# Patient Record
Sex: FEMALE | Race: WHITE | Employment: OTHER | URBAN - METROPOLITAN AREA
[De-identification: names, ages, dates, MRNs, and addresses within clinical notes are randomized per-mention and may not be internally consistent; named-entity substitution may affect disease eponyms.]

---

## 2018-04-05 ENCOUNTER — HOSPITAL ENCOUNTER (OUTPATIENT)
Age: 83
Discharge: HOME | End: 2018-04-07
Payer: MEDICARE

## 2018-04-05 ENCOUNTER — HOSPITAL ENCOUNTER (OUTPATIENT)
Dept: GENERAL RADIOLOGY | Age: 83
Discharge: HOME | End: 2018-04-07
Payer: MEDICARE

## 2018-04-05 DIAGNOSIS — J40 BRONCHITIS: ICD-10-CM

## 2018-04-05 PROCEDURE — 71046 X-RAY EXAM CHEST 2 VIEWS: CPT

## 2018-08-20 ENCOUNTER — HOSPITAL ENCOUNTER (OUTPATIENT)
Age: 83
Discharge: HOME | End: 2018-08-20
Payer: MEDICARE

## 2018-08-20 LAB
ALBUMIN SERPL-MCNC: 4 G/DL (ref 3.5–5.2)
ALP BLD-CCNC: 73 U/L (ref 35–104)
ALT SERPL-CCNC: 11 U/L (ref 0–32)
ANION GAP SERPL CALCULATED.3IONS-SCNC: 10 MMOL/L (ref 7–16)
AST SERPL-CCNC: 21 U/L (ref 0–31)
BASOPHILS ABSOLUTE: 0.03 E9/L (ref 0–0.2)
BASOPHILS RELATIVE PERCENT: 0.5 % (ref 0–2)
BILIRUB SERPL-MCNC: 0.5 MG/DL (ref 0–1.2)
BUN BLDV-MCNC: 15 MG/DL (ref 8–23)
CALCIUM SERPL-MCNC: 9.3 MG/DL (ref 8.6–10.2)
CHLORIDE BLD-SCNC: 98 MMOL/L (ref 98–107)
CHOLESTEROL, FASTING: 202 MG/DL (ref 0–199)
CO2: 33 MMOL/L (ref 22–29)
CREAT SERPL-MCNC: 1.1 MG/DL (ref 0.5–1)
EOSINOPHILS ABSOLUTE: 0.19 E9/L (ref 0.05–0.5)
EOSINOPHILS RELATIVE PERCENT: 2.9 % (ref 0–6)
GFR AFRICAN AMERICAN: 57
GFR NON-AFRICAN AMERICAN: 47 ML/MIN/1.73
GLUCOSE FASTING: 105 MG/DL (ref 74–109)
HCT VFR BLD CALC: 38.8 % (ref 34–48)
HDLC SERPL-MCNC: 90 MG/DL
HEMOGLOBIN: 12.6 G/DL (ref 11.5–15.5)
IMMATURE GRANULOCYTES #: 0.03 E9/L
IMMATURE GRANULOCYTES %: 0.5 % (ref 0–5)
LDL CHOLESTEROL CALCULATED: 92 MG/DL (ref 0–99)
LYMPHOCYTES ABSOLUTE: 1.92 E9/L (ref 1.5–4)
LYMPHOCYTES RELATIVE PERCENT: 29.5 % (ref 20–42)
MCH RBC QN AUTO: 29.4 PG (ref 26–35)
MCHC RBC AUTO-ENTMCNC: 32.5 % (ref 32–34.5)
MCV RBC AUTO: 90.4 FL (ref 80–99.9)
MONOCYTES ABSOLUTE: 0.65 E9/L (ref 0.1–0.95)
MONOCYTES RELATIVE PERCENT: 10 % (ref 2–12)
NEUTROPHILS ABSOLUTE: 3.68 E9/L (ref 1.8–7.3)
NEUTROPHILS RELATIVE PERCENT: 56.6 % (ref 43–80)
PDW BLD-RTO: 13 FL (ref 11.5–15)
PLATELET # BLD: 178 E9/L (ref 130–450)
PMV BLD AUTO: 10.9 FL (ref 7–12)
POTASSIUM SERPL-SCNC: 4.9 MMOL/L (ref 3.5–5)
RBC # BLD: 4.29 E12/L (ref 3.5–5.5)
SODIUM BLD-SCNC: 141 MMOL/L (ref 132–146)
TOTAL PROTEIN: 6.7 G/DL (ref 6.4–8.3)
TRIGLYCERIDE, FASTING: 101 MG/DL (ref 0–149)
TSH SERPL DL<=0.05 MIU/L-ACNC: 2.14 UIU/ML (ref 0.27–4.2)
VITAMIN D 25-HYDROXY: 47 NG/ML (ref 30–100)
VLDLC SERPL CALC-MCNC: 20 MG/DL
WBC # BLD: 6.5 E9/L (ref 4.5–11.5)

## 2018-08-20 PROCEDURE — 85025 COMPLETE CBC W/AUTO DIFF WBC: CPT

## 2018-08-20 PROCEDURE — 36415 COLL VENOUS BLD VENIPUNCTURE: CPT

## 2018-08-20 PROCEDURE — 82306 VITAMIN D 25 HYDROXY: CPT

## 2018-08-20 PROCEDURE — 80053 COMPREHEN METABOLIC PANEL: CPT

## 2018-08-20 PROCEDURE — 84443 ASSAY THYROID STIM HORMONE: CPT

## 2018-08-20 PROCEDURE — 80061 LIPID PANEL: CPT

## 2019-01-28 ENCOUNTER — HOSPITAL ENCOUNTER (OUTPATIENT)
Age: 84
Discharge: HOME | End: 2019-01-28
Payer: MEDICARE

## 2019-01-28 LAB
ALBUMIN SERPL-MCNC: 4.1 G/DL (ref 3.5–5.2)
ALP BLD-CCNC: 74 U/L (ref 35–104)
ALT SERPL-CCNC: 11 U/L (ref 0–32)
ANION GAP SERPL CALCULATED.3IONS-SCNC: 11 MMOL/L (ref 7–16)
AST SERPL-CCNC: 22 U/L (ref 0–31)
BASOPHILS ABSOLUTE: 0.03 E9/L (ref 0–0.2)
BASOPHILS RELATIVE PERCENT: 0.6 % (ref 0–2)
BILIRUB SERPL-MCNC: 0.5 MG/DL (ref 0–1.2)
BUN BLDV-MCNC: 17 MG/DL (ref 8–23)
CALCIUM SERPL-MCNC: 9.3 MG/DL (ref 8.6–10.2)
CHLORIDE BLD-SCNC: 100 MMOL/L (ref 98–107)
CHOLESTEROL, TOTAL: 171 MG/DL (ref 0–199)
CO2: 31 MMOL/L (ref 22–29)
CREAT SERPL-MCNC: 0.9 MG/DL (ref 0.5–1)
EOSINOPHILS ABSOLUTE: 0.17 E9/L (ref 0.05–0.5)
EOSINOPHILS RELATIVE PERCENT: 3.4 % (ref 0–6)
GFR AFRICAN AMERICAN: >60
GFR NON-AFRICAN AMERICAN: 59 ML/MIN/1.73
GLUCOSE BLD-MCNC: 106 MG/DL (ref 74–99)
HCT VFR BLD CALC: 41.1 % (ref 34–48)
HDLC SERPL-MCNC: 69 MG/DL
HEMOGLOBIN: 13 G/DL (ref 11.5–15.5)
IMMATURE GRANULOCYTES #: 0.01 E9/L
IMMATURE GRANULOCYTES %: 0.2 % (ref 0–5)
LDL CHOLESTEROL CALCULATED: 83 MG/DL (ref 0–99)
LYMPHOCYTES ABSOLUTE: 1.32 E9/L (ref 1.5–4)
LYMPHOCYTES RELATIVE PERCENT: 26.6 % (ref 20–42)
MCH RBC QN AUTO: 28.6 PG (ref 26–35)
MCHC RBC AUTO-ENTMCNC: 31.6 % (ref 32–34.5)
MCV RBC AUTO: 90.5 FL (ref 80–99.9)
MONOCYTES ABSOLUTE: 0.49 E9/L (ref 0.1–0.95)
MONOCYTES RELATIVE PERCENT: 9.9 % (ref 2–12)
NEUTROPHILS ABSOLUTE: 2.94 E9/L (ref 1.8–7.3)
NEUTROPHILS RELATIVE PERCENT: 59.3 % (ref 43–80)
PDW BLD-RTO: 13.7 FL (ref 11.5–15)
PLATELET # BLD: 155 E9/L (ref 130–450)
PMV BLD AUTO: 11.2 FL (ref 7–12)
POTASSIUM SERPL-SCNC: 4.2 MMOL/L (ref 3.5–5)
RBC # BLD: 4.54 E12/L (ref 3.5–5.5)
SODIUM BLD-SCNC: 142 MMOL/L (ref 132–146)
TOTAL PROTEIN: 6.5 G/DL (ref 6.4–8.3)
TRIGL SERPL-MCNC: 96 MG/DL (ref 0–149)
TSH SERPL DL<=0.05 MIU/L-ACNC: 2.38 UIU/ML (ref 0.27–4.2)
VITAMIN D 25-HYDROXY: 37 NG/ML (ref 30–100)
VLDLC SERPL CALC-MCNC: 19 MG/DL
WBC # BLD: 5 E9/L (ref 4.5–11.5)

## 2019-01-28 PROCEDURE — 82306 VITAMIN D 25 HYDROXY: CPT

## 2019-01-28 PROCEDURE — 85025 COMPLETE CBC W/AUTO DIFF WBC: CPT

## 2019-01-28 PROCEDURE — 84443 ASSAY THYROID STIM HORMONE: CPT

## 2019-01-28 PROCEDURE — 80053 COMPREHEN METABOLIC PANEL: CPT

## 2019-01-28 PROCEDURE — 36415 COLL VENOUS BLD VENIPUNCTURE: CPT

## 2019-01-28 PROCEDURE — 80061 LIPID PANEL: CPT

## 2019-09-20 ENCOUNTER — HOSPITAL ENCOUNTER (OUTPATIENT)
Age: 84
Discharge: HOME | End: 2019-09-20
Payer: MEDICARE

## 2019-09-20 LAB
ALBUMIN SERPL-MCNC: 4.2 G/DL (ref 3.5–5.2)
ALP BLD-CCNC: 78 U/L (ref 35–104)
ALT SERPL-CCNC: 11 U/L (ref 0–32)
ANION GAP SERPL CALCULATED.3IONS-SCNC: 9 MMOL/L (ref 7–16)
AST SERPL-CCNC: 21 U/L (ref 0–31)
BASOPHILS ABSOLUTE: 0.03 E9/L (ref 0–0.2)
BASOPHILS RELATIVE PERCENT: 0.5 % (ref 0–2)
BILIRUB SERPL-MCNC: 0.4 MG/DL (ref 0–1.2)
BUN BLDV-MCNC: 21 MG/DL (ref 8–23)
CALCIUM SERPL-MCNC: 9.3 MG/DL (ref 8.6–10.2)
CHLORIDE BLD-SCNC: 100 MMOL/L (ref 98–107)
CHOLESTEROL, TOTAL: 183 MG/DL (ref 0–199)
CO2: 33 MMOL/L (ref 22–29)
CREAT SERPL-MCNC: 1 MG/DL (ref 0.5–1)
EOSINOPHILS ABSOLUTE: 0.23 E9/L (ref 0.05–0.5)
EOSINOPHILS RELATIVE PERCENT: 3.7 % (ref 0–6)
GFR AFRICAN AMERICAN: >60
GFR NON-AFRICAN AMERICAN: 52 ML/MIN/1.73
GLUCOSE BLD-MCNC: 104 MG/DL (ref 74–99)
HCT VFR BLD CALC: 43.4 % (ref 34–48)
HDLC SERPL-MCNC: 80 MG/DL
HEMOGLOBIN: 13.9 G/DL (ref 11.5–15.5)
IMMATURE GRANULOCYTES #: 0.01 E9/L
IMMATURE GRANULOCYTES %: 0.2 % (ref 0–5)
LDL CHOLESTEROL CALCULATED: 87 MG/DL (ref 0–99)
LYMPHOCYTES ABSOLUTE: 1.43 E9/L (ref 1.5–4)
LYMPHOCYTES RELATIVE PERCENT: 22.8 % (ref 20–42)
MCH RBC QN AUTO: 29.6 PG (ref 26–35)
MCHC RBC AUTO-ENTMCNC: 32 % (ref 32–34.5)
MCV RBC AUTO: 92.3 FL (ref 80–99.9)
MONOCYTES ABSOLUTE: 0.63 E9/L (ref 0.1–0.95)
MONOCYTES RELATIVE PERCENT: 10 % (ref 2–12)
NEUTROPHILS ABSOLUTE: 3.95 E9/L (ref 1.8–7.3)
NEUTROPHILS RELATIVE PERCENT: 62.8 % (ref 43–80)
PDW BLD-RTO: 13.2 FL (ref 11.5–15)
PLATELET # BLD: 161 E9/L (ref 130–450)
PMV BLD AUTO: 11.3 FL (ref 7–12)
POTASSIUM SERPL-SCNC: 4.3 MMOL/L (ref 3.5–5)
RBC # BLD: 4.7 E12/L (ref 3.5–5.5)
SODIUM BLD-SCNC: 142 MMOL/L (ref 132–146)
TOTAL PROTEIN: 7.4 G/DL (ref 6.4–8.3)
TRIGL SERPL-MCNC: 81 MG/DL (ref 0–149)
TSH SERPL DL<=0.05 MIU/L-ACNC: 2.4 UIU/ML (ref 0.27–4.2)
VITAMIN D 25-HYDROXY: 49 NG/ML (ref 30–100)
VLDLC SERPL CALC-MCNC: 16 MG/DL
WBC # BLD: 6.3 E9/L (ref 4.5–11.5)

## 2019-09-20 PROCEDURE — 85025 COMPLETE CBC W/AUTO DIFF WBC: CPT

## 2019-09-20 PROCEDURE — 36415 COLL VENOUS BLD VENIPUNCTURE: CPT

## 2019-09-20 PROCEDURE — 82306 VITAMIN D 25 HYDROXY: CPT

## 2019-09-20 PROCEDURE — 80061 LIPID PANEL: CPT

## 2019-09-20 PROCEDURE — 84443 ASSAY THYROID STIM HORMONE: CPT

## 2019-09-20 PROCEDURE — 80053 COMPREHEN METABOLIC PANEL: CPT

## 2020-05-15 ENCOUNTER — HOSPITAL ENCOUNTER (OUTPATIENT)
Dept: GENERAL RADIOLOGY | Age: 85
Discharge: HOME | End: 2020-05-17
Payer: MEDICARE

## 2020-05-15 ENCOUNTER — HOSPITAL ENCOUNTER (OUTPATIENT)
Age: 85
Discharge: HOME | End: 2020-05-17
Payer: MEDICARE

## 2020-05-15 PROCEDURE — 71046 X-RAY EXAM CHEST 2 VIEWS: CPT

## 2020-09-11 ENCOUNTER — APPOINTMENT (OUTPATIENT)
Dept: GENERAL RADIOLOGY | Age: 85
End: 2020-09-11
Payer: MEDICARE

## 2020-09-11 ENCOUNTER — HOSPITAL ENCOUNTER (OUTPATIENT)
Age: 85
Setting detail: OBSERVATION
Discharge: HOME | End: 2020-09-13
Attending: EMERGENCY MEDICINE | Admitting: INTERNAL MEDICINE
Payer: MEDICARE

## 2020-09-11 LAB
ALBUMIN SERPL-MCNC: 4.1 G/DL (ref 3.5–5.2)
ALP BLD-CCNC: 95 U/L (ref 35–104)
ALT SERPL-CCNC: 11 U/L (ref 0–32)
ANION GAP SERPL CALCULATED.3IONS-SCNC: 17 MMOL/L (ref 7–16)
AST SERPL-CCNC: 24 U/L (ref 0–31)
BASOPHILS ABSOLUTE: 0.04 E9/L (ref 0–0.2)
BASOPHILS RELATIVE PERCENT: 0.6 % (ref 0–2)
BILIRUB SERPL-MCNC: 0.3 MG/DL (ref 0–1.2)
BUN BLDV-MCNC: 21 MG/DL (ref 8–23)
CALCIUM SERPL-MCNC: 9.4 MG/DL (ref 8.6–10.2)
CHLORIDE BLD-SCNC: 95 MMOL/L (ref 98–107)
CO2: 28 MMOL/L (ref 22–29)
CREAT SERPL-MCNC: 1 MG/DL (ref 0.5–1)
EOSINOPHILS ABSOLUTE: 0.18 E9/L (ref 0.05–0.5)
EOSINOPHILS RELATIVE PERCENT: 2.6 % (ref 0–6)
GFR AFRICAN AMERICAN: >60
GFR NON-AFRICAN AMERICAN: 52 ML/MIN/1.73
GLUCOSE BLD-MCNC: 151 MG/DL (ref 74–99)
HCT VFR BLD CALC: 41.6 % (ref 34–48)
HEMOGLOBIN: 13.4 G/DL (ref 11.5–15.5)
IMMATURE GRANULOCYTES #: 0.03 E9/L
IMMATURE GRANULOCYTES %: 0.4 % (ref 0–5)
LYMPHOCYTES ABSOLUTE: 2.02 E9/L (ref 1.5–4)
LYMPHOCYTES RELATIVE PERCENT: 29.2 % (ref 20–42)
MCH RBC QN AUTO: 28.3 PG (ref 26–35)
MCHC RBC AUTO-ENTMCNC: 32.2 % (ref 32–34.5)
MCV RBC AUTO: 87.9 FL (ref 80–99.9)
MONOCYTES ABSOLUTE: 0.74 E9/L (ref 0.1–0.95)
MONOCYTES RELATIVE PERCENT: 10.7 % (ref 2–12)
NEUTROPHILS ABSOLUTE: 3.91 E9/L (ref 1.8–7.3)
NEUTROPHILS RELATIVE PERCENT: 56.5 % (ref 43–80)
PDW BLD-RTO: 13.6 FL (ref 11.5–15)
PLATELET # BLD: 184 E9/L (ref 130–450)
PMV BLD AUTO: 11.5 FL (ref 7–12)
POTASSIUM SERPL-SCNC: 3.1 MMOL/L (ref 3.5–5)
RBC # BLD: 4.73 E12/L (ref 3.5–5.5)
SODIUM BLD-SCNC: 140 MMOL/L (ref 132–146)
TOTAL PROTEIN: 7.6 G/DL (ref 6.4–8.3)
TROPONIN: <0.01 NG/ML (ref 0–0.03)
TSH SERPL DL<=0.05 MIU/L-ACNC: 3.88 UIU/ML (ref 0.27–4.2)
WBC # BLD: 6.9 E9/L (ref 4.5–11.5)

## 2020-09-11 PROCEDURE — 93005 ELECTROCARDIOGRAM TRACING: CPT | Performed by: PHYSICIAN ASSISTANT

## 2020-09-11 PROCEDURE — 80053 COMPREHEN METABOLIC PANEL: CPT

## 2020-09-11 PROCEDURE — 71045 X-RAY EXAM CHEST 1 VIEW: CPT

## 2020-09-11 PROCEDURE — 85025 COMPLETE CBC W/AUTO DIFF WBC: CPT

## 2020-09-11 PROCEDURE — 84443 ASSAY THYROID STIM HORMONE: CPT

## 2020-09-11 PROCEDURE — 99285 EMERGENCY DEPT VISIT HI MDM: CPT

## 2020-09-11 PROCEDURE — 84484 ASSAY OF TROPONIN QUANT: CPT

## 2020-09-11 RX ORDER — FUROSEMIDE 20 MG/1
20 TABLET ORAL 2 TIMES DAILY
Status: ON HOLD | COMMUNITY
End: 2020-09-13 | Stop reason: SDUPTHER

## 2020-09-11 RX ORDER — POTASSIUM CHLORIDE 20 MEQ/1
40 TABLET, EXTENDED RELEASE ORAL ONCE
Status: COMPLETED | OUTPATIENT
Start: 2020-09-12 | End: 2020-09-12

## 2020-09-12 ENCOUNTER — APPOINTMENT (OUTPATIENT)
Dept: ULTRASOUND IMAGING | Age: 85
End: 2020-09-12
Payer: MEDICARE

## 2020-09-12 PROBLEM — R55 NEAR SYNCOPE: Status: ACTIVE | Noted: 2020-09-12

## 2020-09-12 LAB
EKG ATRIAL RATE: 68 BPM
EKG P AXIS: 70 DEGREES
EKG P-R INTERVAL: 166 MS
EKG Q-T INTERVAL: 426 MS
EKG QRS DURATION: 84 MS
EKG QTC CALCULATION (BAZETT): 452 MS
EKG R AXIS: -16 DEGREES
EKG T AXIS: 41 DEGREES
EKG VENTRICULAR RATE: 68 BPM
LV EF: 61 %
LVEF MODALITY: NORMAL

## 2020-09-12 PROCEDURE — 93306 TTE W/DOPPLER COMPLETE: CPT

## 2020-09-12 PROCEDURE — 6370000000 HC RX 637 (ALT 250 FOR IP): Performed by: INTERNAL MEDICINE

## 2020-09-12 PROCEDURE — 93307 TTE W/O DOPPLER COMPLETE: CPT

## 2020-09-12 PROCEDURE — 93010 ELECTROCARDIOGRAM REPORT: CPT | Performed by: INTERNAL MEDICINE

## 2020-09-12 PROCEDURE — 96372 THER/PROPH/DIAG INJ SC/IM: CPT

## 2020-09-12 PROCEDURE — 6360000002 HC RX W HCPCS: Performed by: INTERNAL MEDICINE

## 2020-09-12 PROCEDURE — 93880 EXTRACRANIAL BILAT STUDY: CPT

## 2020-09-12 PROCEDURE — G0378 HOSPITAL OBSERVATION PER HR: HCPCS

## 2020-09-12 PROCEDURE — 6370000000 HC RX 637 (ALT 250 FOR IP): Performed by: PHYSICIAN ASSISTANT

## 2020-09-12 RX ORDER — POTASSIUM CHLORIDE 20 MEQ/1
20 TABLET, EXTENDED RELEASE ORAL DAILY
Status: DISCONTINUED | OUTPATIENT
Start: 2020-09-12 | End: 2020-09-13 | Stop reason: HOSPADM

## 2020-09-12 RX ORDER — CLONIDINE HYDROCHLORIDE 0.1 MG/1
0.1 TABLET ORAL 2 TIMES DAILY
Status: DISCONTINUED | OUTPATIENT
Start: 2020-09-12 | End: 2020-09-13 | Stop reason: HOSPADM

## 2020-09-12 RX ORDER — ATORVASTATIN CALCIUM 20 MG/1
20 TABLET, FILM COATED ORAL DAILY
COMMUNITY

## 2020-09-12 RX ORDER — ACETAMINOPHEN 500 MG
500 TABLET ORAL EVERY 6 HOURS PRN
Status: DISCONTINUED | OUTPATIENT
Start: 2020-09-12 | End: 2020-09-13 | Stop reason: HOSPADM

## 2020-09-12 RX ORDER — CLONIDINE HYDROCHLORIDE 0.2 MG/1
0.2 TABLET ORAL 2 TIMES DAILY
Status: ON HOLD | COMMUNITY
End: 2020-09-13 | Stop reason: SDUPTHER

## 2020-09-12 RX ORDER — CLONIDINE HYDROCHLORIDE 0.2 MG/1
0.2 TABLET ORAL 2 TIMES DAILY
Status: DISCONTINUED | OUTPATIENT
Start: 2020-09-12 | End: 2020-09-12

## 2020-09-12 RX ORDER — FUROSEMIDE 20 MG/1
20 TABLET ORAL 2 TIMES DAILY
Status: DISCONTINUED | OUTPATIENT
Start: 2020-09-12 | End: 2020-09-13 | Stop reason: HOSPADM

## 2020-09-12 RX ORDER — ASPIRIN 81 MG/1
81 TABLET ORAL DAILY
Status: DISCONTINUED | OUTPATIENT
Start: 2020-09-12 | End: 2020-09-13 | Stop reason: HOSPADM

## 2020-09-12 RX ORDER — ATORVASTATIN CALCIUM 20 MG/1
20 TABLET, FILM COATED ORAL DAILY
Status: DISCONTINUED | OUTPATIENT
Start: 2020-09-12 | End: 2020-09-13 | Stop reason: HOSPADM

## 2020-09-12 RX ORDER — CLOPIDOGREL BISULFATE 75 MG/1
75 TABLET ORAL DAILY
Status: DISCONTINUED | OUTPATIENT
Start: 2020-09-12 | End: 2020-09-13 | Stop reason: HOSPADM

## 2020-09-12 RX ORDER — LORATADINE 10 MG
1 TABLET ORAL 2 TIMES DAILY
Status: DISCONTINUED | OUTPATIENT
Start: 2020-09-12 | End: 2020-09-12 | Stop reason: CLARIF

## 2020-09-12 RX ORDER — LISINOPRIL 10 MG/1
10 TABLET ORAL DAILY
Status: DISCONTINUED | OUTPATIENT
Start: 2020-09-12 | End: 2020-09-13

## 2020-09-12 RX ORDER — ROPINIROLE 1 MG/1
1 TABLET, FILM COATED ORAL NIGHTLY
Status: DISCONTINUED | OUTPATIENT
Start: 2020-09-12 | End: 2020-09-13 | Stop reason: HOSPADM

## 2020-09-12 RX ORDER — LEVOTHYROXINE SODIUM 0.03 MG/1
25 TABLET ORAL DAILY
Status: DISCONTINUED | OUTPATIENT
Start: 2020-09-12 | End: 2020-09-13 | Stop reason: HOSPADM

## 2020-09-12 RX ADMIN — POTASSIUM CHLORIDE 20 MEQ: 1500 TABLET, EXTENDED RELEASE ORAL at 08:41

## 2020-09-12 RX ADMIN — LEVOTHYROXINE SODIUM 25 MCG: 25 TABLET ORAL at 05:58

## 2020-09-12 RX ADMIN — ASPIRIN 81 MG: 81 TABLET, COATED ORAL at 08:40

## 2020-09-12 RX ADMIN — FUROSEMIDE 20 MG: 20 TABLET ORAL at 08:41

## 2020-09-12 RX ADMIN — ATORVASTATIN CALCIUM 20 MG: 20 TABLET, FILM COATED ORAL at 08:40

## 2020-09-12 RX ADMIN — CLOPIDOGREL BISULFATE 75 MG: 75 TABLET ORAL at 08:40

## 2020-09-12 RX ADMIN — POTASSIUM CHLORIDE 40 MEQ: 1500 TABLET, EXTENDED RELEASE ORAL at 00:38

## 2020-09-12 RX ADMIN — LISINOPRIL 10 MG: 10 TABLET ORAL at 08:40

## 2020-09-12 RX ADMIN — FUROSEMIDE 20 MG: 20 TABLET ORAL at 21:37

## 2020-09-12 RX ADMIN — CLONIDINE HYDROCHLORIDE 0.2 MG: 0.2 TABLET ORAL at 08:40

## 2020-09-12 RX ADMIN — ROPINIROLE HYDROCHLORIDE 1 MG: 1 TABLET, FILM COATED ORAL at 17:22

## 2020-09-12 RX ADMIN — ENOXAPARIN SODIUM 40 MG: 40 INJECTION SUBCUTANEOUS at 08:46

## 2020-09-12 ASSESSMENT — PAIN SCALES - GENERAL
PAINLEVEL_OUTOF10: 0

## 2020-09-12 NOTE — ED PROVIDER NOTES
ED Attending  CC: No                                                                                                                                    Department of Emergency Medicine   ED  Provider Note  Admit Date/RoomTime: 9/11/2020 10:16 PM  ED Room: 02/02        HPI:  9/11/20,   Time: 10:28 PM EDT         Rianna Guevara is a 80 y.o. female presenting to the ED for near syncope, beginning just prior to arrival.  The complaint has been improved, moderate in severity, and worsened by nothing. Pt presents via EMS reporting near syncopal episode. 1 of her relatives is actually 1 of the techs here in the ED. She reports that the patient was sitting in a barstool when she got very pale and clammy. The patient slumped down but did not pass out altogether. She tells me she felt dizzy and lightheaded and could tell she was going to pass out. According to the family member her pulse at the time was 52. The patient states that she has had this episodes before though nothing this severe. She does have a cardiologist and syncope as listed on her problem list but I have nothing more specific. When asked about a Holter monitor she tells me she does not believe she is ever had one. She does have a past history of stroke and carotid endarterectomy. At this time the patient is completely asymptomatic. Apparently this episode lasted about 10 minutes. She has no symptoms at this time. Denies chest pain, shortness of breath, dizziness, or weakness. She is not on any rate limiting meds. Patient did not fall or hurt herself in any way. She states that she has had very good recent health. I do not see any hospital admissions since 2015 when she fell and broke her hip. The family confirms that the patient's health is been quite good for her age.           ROS:     Constitutional: Negative for fever and chills  HENT: Negative for ear pain, sore throat and sinus pressure  Eyes: Negative for pain, discharge and redness  Respiratory:  See HPI  Cardiovascular:  See HPI  Gastrointestinal: Negative for nausea, vomiting, diarrhea and abdominal distention  Genitourinary: Negative for dysuria and frequency  Musculoskeletal: Negative for back pain and arthralgia  Skin: Negative for rash and wound  Neurological: Negative for weakness and headaches  Hematological: Negative for adenopathy    All other systems reviewed and are negative      -------------------------------- PAST HISTORY ----------------------------------  Past Medical History:  has a past medical history of Arthritis, Cataract, Closed subtrochanteric fracture of left femur with delayed healing, Heart murmur, History of cardiovascular stress test, Hyperlipidemia, and Hypertension. Past Surgical History:  has a past surgical history that includes Revision total hip arthroplasty; Appendectomy; Hysterectomy; hip surgery; Pelvic fracture surgery (2008); joint replacement (5/10/2012); hip surgery (Left, 8/20/15); and Skin cancer excision. Social History:  reports that she quit smoking about 33 years ago. She quit after 20.00 years of use. She has never used smokeless tobacco. She reports that she does not drink alcohol or use drugs. Family History: family history is not on file. The patients home medications have been reviewed.     Allergies: Ibuprofen; Penicillins; and Sulfur    --------------------------------- RESULTS ------------------------------------------  All laboratory and radiology results have been personally reviewed by myself   LABS:  Results for orders placed or performed during the hospital encounter of 09/11/20   CBC Auto Differential   Result Value Ref Range    WBC 6.9 4.5 - 11.5 E9/L    RBC 4.73 3.50 - 5.50 E12/L    Hemoglobin 13.4 11.5 - 15.5 g/dL    Hematocrit 41.6 34.0 - 48.0 %    MCV 87.9 80.0 - 99.9 fL    MCH 28.3 26.0 - 35.0 pg    MCHC 32.2 32.0 - 34.5 %    RDW 13.6 11.5 - 15.0 fL    Platelets 533 039 - 619 E9/L    MPV 11.5 7.0 - 12.0 fL Neutrophils % 56.5 43.0 - 80.0 %    Immature Granulocytes % 0.4 0.0 - 5.0 %    Lymphocytes % 29.2 20.0 - 42.0 %    Monocytes % 10.7 2.0 - 12.0 %    Eosinophils % 2.6 0.0 - 6.0 %    Basophils % 0.6 0.0 - 2.0 %    Neutrophils Absolute 3.91 1.80 - 7.30 E9/L    Immature Granulocytes # 0.03 E9/L    Lymphocytes Absolute 2.02 1.50 - 4.00 E9/L    Monocytes Absolute 0.74 0.10 - 0.95 E9/L    Eosinophils Absolute 0.18 0.05 - 0.50 E9/L    Basophils Absolute 0.04 0.00 - 0.20 E9/L   Comprehensive Metabolic Panel   Result Value Ref Range    Sodium 140 132 - 146 mmol/L    Potassium 3.1 (L) 3.5 - 5.0 mmol/L    Chloride 95 (L) 98 - 107 mmol/L    CO2 28 22 - 29 mmol/L    Anion Gap 17 (H) 7 - 16 mmol/L    Glucose 151 (H) 74 - 99 mg/dL    BUN 21 8 - 23 mg/dL    CREATININE 1.0 0.5 - 1.0 mg/dL    GFR Non-African American 52 >=60 mL/min/1.73    GFR African American >60     Calcium 9.4 8.6 - 10.2 mg/dL    Total Protein 7.6 6.4 - 8.3 g/dL    Alb 4.1 3.5 - 5.2 g/dL    Total Bilirubin 0.3 0.0 - 1.2 mg/dL    Alkaline Phosphatase 95 35 - 104 U/L    ALT 11 0 - 32 U/L    AST 24 0 - 31 U/L   Troponin   Result Value Ref Range    Troponin <0.01 0.00 - 0.03 ng/mL   TSH without Reflex   Result Value Ref Range    TSH 3.880 0.270 - 4.200 uIU/mL   EKG 12 Lead   Result Value Ref Range    Ventricular Rate 68 BPM    Atrial Rate 68 BPM    P-R Interval 166 ms    QRS Duration 84 ms    Q-T Interval 426 ms    QTc Calculation (Bazett) 452 ms    P Axis 70 degrees    R Axis -16 degrees    T Axis 41 degrees       RADIOLOGY:  Interpreted by Radiologist.  XR CHEST PORTABLE   Final Result   No acute cardiopulmonary findings. ----------------- NURSING NOTES AND VITALS REVIEWED ---------------   The nursing notes within the ED encounter and vital signs as below have been reviewed.    BP (!) 163/64   Pulse 67   Temp 97.8 °F (36.6 °C) (Oral)   Resp 15   Ht 5' (1.524 m)   Wt 132 lb (59.9 kg)   SpO2 98%   BMI 25.78 kg/m²   Oxygen Saturation Interpretation: Normal      --------------------------------PHYSICAL EXAM------------------------------------      Constitutional/General: Alert and oriented x3, well appearing, non toxic in NAD  Head: NC/AT  Eyes: PERRL, EOMI  Mouth: Oropharynx clear, handling secretions, no trismus  Neck: Supple, full ROM, no meningeal signs  Pulmonary: Lungs clear to auscultation bilaterally, no wheezes, rales, or rhonchi. Not in respiratory distress  Cardiovascular:  Regular rate and rhythm, no murmurs, gallops, or rubs. 2+ distal pulses  Abdomen: Soft, + BS. No distension. Nontender. No palpable rigidity, rebound or guarding  Extremities: Moves all extremities x 4. Warm and well perfused  Skin: warm and dry without rash  Neurologic: GCS 15,  Intact. No focal deficits  Psych: Normal Affect      ------------------------ ED COURSE/MEDICAL DECISION MAKING----------------------  Medications   potassium chloride (KLOR-CON M) extended release tablet 40 mEq (has no administration in time range)         Medical Decision Making:    Patient had a near syncopal episode at home. Family reporting that her pulse was only 47. EKG without ischemic changes and troponin is normal.  She has a mild hypokalemia with potassium 3.1. We did replete this here in the ED. Chest x-ray is clear. She has had no further symptoms here in the ED and is very comfortable. Plan observation to telemetry for further evaluation. She is aware and agreeable to this plan. Discussed with Dr. Nitin Barrios who will place orders for admission. Her cardiologist is Dr. Karlee Simpson. Family aware and agreeable to this plan as well.     ED Course as of Sep 12 0008   Fri Sep 11, 2020   9223 ATTENDING PROVIDER ATTESTATION:     Kapil Reyonlds presented to the emergency department for evaluation of [unfilled]  I have reviewed and discussed the case, including pertinent history (medical, surgical, family and social) and exam findings with the Midlevel and the Nurse assigned to Tammy Carlos. I have personally performed and/or participated in the history, exam, medical decision making, and procedures and agree with all pertinent clinical information. I have reviewed my findings and recommendations with Tammy Carlos and members of family present at the time of disposition. My findings/plan: Patient is an 80-year-old female who presents via EMS from home following near syncopal event. This was witnessed by field members. The patient was out at a restaurant sitting in a stool when she all of a sudden became very pale and slumped over. Family member was at bedside and she is a healthcare professional and states that her heart rate was in the 45s. Patient is now back to her normal baseline. She states that she has had multiple syncopal episodes in the past and she did tell her cardiologist about it, they are monitoring her because she is feels to have a heart valve replacement some point. They were monitoring her until she was needing it. The patient currently denies any symptoms. She was experiencing lightheadedness and nausea when the episode happened. No other recent illnesses. No medication changes. On examination the patient is resting comfortably in bed. No external evidence of head trauma. Pupils equal and reactive bilaterally. Moist oral mucosa. Clear breath sounds in all lung fields. Regular rate and rhythm of the heart. There is a systolic murmur auscultated, grade 3/6. No abdominal tenderness palpation. No lower extremity edema. Patient is awake, alert and oriented x3. No focal neurological deficits. Robbie العلي DO      [MS]      ED Course User Index  [MS] Sveta Samson DO       Counseling: The emergency provider has spoken with the patient and discussed todays results, in addition to providing specific details for the plan of care and counseling regarding the diagnosis and prognosis.   Questions are answered at this time and they are agreeable with the plan.      ------------------------ IMPRESSION AND DISPOSITION -------------------------------    IMPRESSION  1. Near syncope    2.  Hypokalemia        DISPOSITION  Disposition: Admit to telemetry  Patient condition is stable                   Sonam Desir PA-C  09/12/20 0008

## 2020-09-12 NOTE — PROGRESS NOTES
During routine database questions, patient stated she is almost 99.9 percent sure medications list was correct after reviewing list with her. Stated she will ask daughter to bring in current list in the morning as well as advance directive paperwork. Per patient  She is a DNR in her paperwork.

## 2020-09-12 NOTE — H&P
Department of Internal Medicine        CHIEF COMPLAINT: Near syncope    Reason for Admission: Same    HISTORY OF PRESENT ILLNESS:      The patient is a 80 y.o. female who presents with a history of sitting on a barstool when she got very pale and clammy. Patient did slumped down but did not pass out altogether. Patient was very dizzy, lightheaded. Apparently the family checked her pulse and was 47 beats a minute. This episode lasted about 10 minutes. Patient denied any chest pain, palpitations or unusual shortness of breath associated with it. There is no focal neurological deficit. Potassium 3.1 on admission with BUN/creatinine 21/1.0. WBC was 6.9. Temperature was 98 with a heart rate of 69. Blood pressure was 202/73 on admission. The patient was seen by cardiology this morning. Patient's heart rate currently is in from 60-73. Patient says she feels back to normal and wants to be discharged. Past Medical History:    Past Medical History:   Diagnosis Date    Arthritis     Cataract     Closed subtrochanteric fracture of left femur with delayed healing 5/25/2016    Heart murmur     History of cardiovascular stress test 02/17/2014    lexiscan    Hyperlipidemia     Hypertension      Past Surgical History:    Past Surgical History:   Procedure Laterality Date    APPENDECTOMY      HIP SURGERY      pins in L hip    HIP SURGERY Left 8/20/15    im nail pinning and hardware removal    HYSTERECTOMY      JOINT REPLACEMENT  5/10/2012    total r hip    PELVIC FRACTURE SURGERY  2008    REVISION TOTAL HIP ARTHROPLASTY      R hip 1999    SKIN CANCER EXCISION      facial and        Medications Prior to Admission:    @  Prior to Admission medications    Medication Sig Start Date End Date Taking?  Authorizing Provider   atorvastatin (LIPITOR) 20 MG tablet Take 20 mg by mouth daily   Yes Historical Provider, MD   cloNIDine (CATAPRES) 0.2 MG tablet Take 0.2 mg by mouth 2 times daily   Yes Historical Provider, MD   furosemide (LASIX) 20 MG tablet Take 20 mg by mouth 2 times daily   Yes Historical Provider, MD   [address] [place] [address] PO Take by mouth 2 times daily   Yes Historical Provider, MD   clopidogrel (PLAVIX) 75 MG tablet Take 1 tablet by mouth daily When cleared with orthopedics 8/23/15  Yes Bryan Reilly, DO   levothyroxine (SYNTHROID) 25 MCG tablet Take 25 mcg by mouth Daily   Yes Historical Provider, MD   aspirin 81 MG EC tablet Take 1 tablet by mouth daily. 14  Yes Florentin Meals Black, DO   rOPINIRole (REQUIP) 1 MG tablet   Take 1 mg by mouth nightly    Yes Historical Provider, MD   VITAMIN D, CHOLECALCIFEROL, PO Take 400 mg by mouth daily.  5 pm   Yes Historical Provider, MD       Allergies:  Ibuprofen; Penicillins; and Sulfur    Social History:   Social History     Socioeconomic History    Marital status:      Spouse name: Not on file    Number of children: Not on file    Years of education: Not on file    Highest education level: Not on file   Occupational History    Not on file   Social Needs    Financial resource strain: Not on file    Food insecurity     Worry: Not on file     Inability: Not on file    Transportation needs     Medical: Not on file     Non-medical: Not on file   Tobacco Use    Smoking status: Former Smoker     Years: 20.00     Last attempt to quit: 1987     Years since quittin.0    Smokeless tobacco: Never Used   Substance and Sexual Activity    Alcohol use: No    Drug use: No    Sexual activity: Not on file   Lifestyle    Physical activity     Days per week: Not on file     Minutes per session: Not on file    Stress: Not on file   Relationships    Social connections     Talks on phone: Not on file     Gets together: Not on file     Attends Voodoo service: Not on file     Active member of club or organization: Not on file     Attends meetings of clubs or organizations: Not on file     Relationship status: Not on file    Intimate partner violence Fear of current or ex partner: Not on file     Emotionally abused: Not on file     Physically abused: Not on file     Forced sexual activity: Not on file   Other Topics Concern    Not on file   Social History Narrative    Not on file       Family History:   History reviewed. No pertinent family history. REVIEW OF SYSTEMS:    Gen: Patient c/o lightheadedness or dizziness with near syncope. No fevers or chills. HEENT: No earache, sore throat or nasal congestion. Resp: Denies cough, hemoptysis or sputum production. Cardiac: Denies chest pain, SOB, diaphoresis or palpitations. GI: No nausea, vomiting, diarrhea or constipation. No melena or hematochezia. : No urinary complaints, dysuria, hematuria or frequency. MSK: No extremity weakness, paralysis or paresthesias. PHYSICAL EXAM:    Vitals:  BP (!) 197/86   Pulse 69   Temp 98 °F (36.7 °C) (Oral)   Resp 18   Ht 5' (1.524 m)   Wt 133 lb 0.1 oz (60.3 kg)   SpO2 95%   BMI 25.98 kg/m²     General:  This is a 80 y.o. yo female who is alert and oriented in NAD  HEENT:  Head is normocephalic and atraumatic, PERRLA, EOMI, mucus membranes moist with no pharyngeal erythema or exudate. Neck:  Supple with no carotid bruits, JVD or thyromegaly.   No cervical adenopathy  CV:  Regular rate and rhythm, 2/6 systolic murmurs  Lungs:  Clear to auscultation bilaterally with no wheezes, rales or rhonchi  Abdomen:  Soft, nontender, nondistended, bowel sounds present  Extremities:  No edema, peripheral pulses intact bilaterally  Neuro:  Cranial nerves II-XII grossly intact; motor and sensory function intact with no focal deficits  Skin:  No rashes, lesions or wounds    DATA:  CBC with Differential:    Lab Results   Component Value Date    WBC 6.9 09/11/2020    RBC 4.73 09/11/2020    HGB 13.4 09/11/2020    HCT 41.6 09/11/2020     09/11/2020    MCV 87.9 09/11/2020    MCH 28.3 09/11/2020    MCHC 32.2 09/11/2020    RDW 13.6 09/11/2020    SEGSPCT 62 ASSESSMENT AND PLAN:      Patient Active Problem List    Diagnosis Date Noted    HTN (hypertension) 02/15/2014     Priority: High    HLD (hyperlipidemia) 02/15/2014     Priority: Low    Near syncope 09/12/2020    Closed subtrochanteric fracture of left femur with delayed healing 05/25/2016    Closed fracture of left femur (HCC)     Syncopal episodes 02/14/2014    DJD (degenerative joint disease) of knee 11/07/2013    Knee pain 11/07/2013    Hip pain 01/24/2012    Aseptic loosening of prosthetic hip ([facility] [address] 01/24/2012     Impression:  1. Dizziness with near syncope  2. Possible symptomatic bradycardia  3. Hypertension uncontrolled  4.   Hyperlipidemia    Plan:  Home meds reviewed  Cardiology consult  Monitor heart rate, blood pressure, O2 saturation  Lisinopril 10 mg p.o. now and daily  Apresoline 5 mg every 6 hours as needed  Routine lab work  Echocardiogram    Liz Becker D.O.  9/12/2020  1:18 PM

## 2020-09-12 NOTE — CONSULTS
[address]                                  CONSULTATION    PATIENT NAME: Orion Gaucher                      :        1931  MED REC NO:   [id number]                            ROOM:       1415  ACCOUNT NO:   [de-identified]                           ADMIT DATE: 2020  PROVIDER:     Maira Chinchilla MD    CONSULT DATE:  2020    HISTORY OF PRESENT ILLNESS:  The patient is 70-year-old lady who I saw  in the past.    She has problem with aortic stenosis that we were following. I  apparently saw her earlier this year in my office. I need to review her  record in my office. The patient presented to the hospital yesterday with what appears to be  lightheadedness and near syncopal episode. She states that she was with her family members celebrating her 80  birthday. She finished eating. She was sitting down when she started  to feel somewhat pale and clammy. She slumped down but she did not pass  out completely. She felt however that she was going to pass out. The family gave her sugar drink to drink and the patient started to feel  better after that. She was brought to the emergency room. She was alert and awake on  arrival to ER. Her blood pressure was 163/64 with pulse of 67. She was then admitted. Cardiac consult was requested. The patient was lying semiupright in bed when I came to see her. She  did not appear in acute distress. The patient admitted having occasional feelings of lightheadedness here  and there in the past.  No clear syncopal episode. PAST MEDICAL HISTORY:  As above plus history of carotid artery disease  and she is followed by  _____. She had carotid ultrasound done  recently in his office and she was told everything looked okay. History  of hysterectomy. HABITS:  She quit smoking about 35 years ago.   She was never a heavy  alcohol drinker. MEDICATIONS PRIOR TO ADMISSION:  According to the chart, the patient is  on:  1. Lipitor. 2.  Catapres. 3.  Lasix. 4.  _____. 5.  Plavix. 6.  Synthroid. 7.  Aspirin. 8.  ReQuip. 9.  Vitamin. REVIEW OF SYSTEMS:  CONSTITUTIONAL:  No fever or chills. HEENT:  No  nosebleed. No hearing problem. No double vision. No stridor. No  hoarseness of the voice. CARDIAC:  No chest pain. No palpitation. PULMONARY:  No shortness of breath. No cough. GASTROENTEROLOGY:  No  abdominal pain. No vomiting. No diarrhea. GENITOURINARY:  No dysuria  or frequency. No bladder or kidney tumor. NEURO:  No clear history of  seizure or stroke. No syncope. HEMATOLOGY:  No bleeding disorder. No  adenopathy. ENDOCRINOLOGY:  No polyuria or polydipsia. SKIN:  No skin  disorder. MUSCULOSKELETAL:  The patient has some arthritis. PSYCH:  No  depression or suicidal ideation. PHYSICAL EXAMINATION:  GENERAL:  The patient was lying semiupright in no acute distress. VITAL SIGNS:  Blood pressure 170/84, pulse 77, temperature 98. 1. NECK:  No JVD. HEART:  Regular S1 and S2. No S3.  3/6 harsh systolic murmur heard best  at the aortic area radiating to the whole precordium. LUNGS:  No rales, no wheezing. ABDOMEN:  Soft, nontender. EXTREMITIES:  No edema, cyanosis or clubbing. Palpable pulses in feet. NEURO:  Alert and awake with no focal deficits. EKG is showing sinus rhythm with heart rate of 68 a minute, poor R-wave  progression in leads V1 to V3 with left axis deviation and heart rate of  68 a minute with nonspecific ST-T wave changes. LABS:  On admission, WBC 6.9, hemoglobin 13.4, platelet count 380. Sodium 140, potassium 3.1, BUN 21, creatinine 1.0. Troponin is  negative. TSH 3.88. IMPRESSION:  1. Near syncope. Etiology is not completely clear.     It can be vasovagal.    The patient has aortic systolic murmur on cardiac exam.  My concern is  near syncope related to severe aortic stenosis. Echocardiogram was ordered to reassess her valvular function. Further cardiac workup would depend on the results of her  echocardiogram.    I recommend checking for orthostatic changes as well. I had long discussion with the patient about all above. All her  questions were answered.         Lili Waters MD    D: 09/12/2020 10:30:35       T: 09/12/2020 10:42:40     [id number]  Job#: [id number]     Doc#: [id number]    CC:

## 2020-09-12 NOTE — PROGRESS NOTES
Pharmacy Note    Michael Andrews was ordered [facility]onson . As per the [address] [place] [address], herbals and certain dietary supplements will be discontinued.   The herbal or dietary supplement may be continued after discharge from the hospital.

## 2020-09-13 VITALS
DIASTOLIC BLOOD PRESSURE: 66 MMHG | HEART RATE: 55 BPM | TEMPERATURE: 98.4 F | RESPIRATION RATE: 18 BRPM | HEIGHT: 60 IN | OXYGEN SATURATION: 93 % | SYSTOLIC BLOOD PRESSURE: 159 MMHG | BODY MASS INDEX: 26.11 KG/M2 | WEIGHT: 133.01 LBS

## 2020-09-13 LAB
ANION GAP SERPL CALCULATED.3IONS-SCNC: 13 MMOL/L (ref 7–16)
BUN BLDV-MCNC: 29 MG/DL (ref 8–23)
CALCIUM SERPL-MCNC: 9.4 MG/DL (ref 8.6–10.2)
CHLORIDE BLD-SCNC: 99 MMOL/L (ref 98–107)
CO2: 26 MMOL/L (ref 22–29)
CREAT SERPL-MCNC: 1.3 MG/DL (ref 0.5–1)
GFR AFRICAN AMERICAN: 47
GFR NON-AFRICAN AMERICAN: 39 ML/MIN/1.73
GLUCOSE BLD-MCNC: 100 MG/DL (ref 74–99)
MAGNESIUM: 2 MG/DL (ref 1.6–2.6)
PHOSPHORUS: 3.3 MG/DL (ref 2.5–4.5)
POTASSIUM SERPL-SCNC: 4.4 MMOL/L (ref 3.5–5)
SODIUM BLD-SCNC: 138 MMOL/L (ref 132–146)

## 2020-09-13 PROCEDURE — 6370000000 HC RX 637 (ALT 250 FOR IP): Performed by: SPECIALIST

## 2020-09-13 PROCEDURE — 83735 ASSAY OF MAGNESIUM: CPT

## 2020-09-13 PROCEDURE — G0378 HOSPITAL OBSERVATION PER HR: HCPCS

## 2020-09-13 PROCEDURE — 36415 COLL VENOUS BLD VENIPUNCTURE: CPT

## 2020-09-13 PROCEDURE — 80048 BASIC METABOLIC PNL TOTAL CA: CPT

## 2020-09-13 PROCEDURE — 96372 THER/PROPH/DIAG INJ SC/IM: CPT

## 2020-09-13 PROCEDURE — 6370000000 HC RX 637 (ALT 250 FOR IP): Performed by: INTERNAL MEDICINE

## 2020-09-13 PROCEDURE — 6360000002 HC RX W HCPCS: Performed by: INTERNAL MEDICINE

## 2020-09-13 PROCEDURE — 84100 ASSAY OF PHOSPHORUS: CPT

## 2020-09-13 RX ORDER — FUROSEMIDE 20 MG/1
20-40 TABLET ORAL DAILY
Qty: 60 TABLET | Refills: 3 | Status: ON HOLD | COMMUNITY
Start: 2020-09-13 | End: 2022-05-05 | Stop reason: HOSPADM

## 2020-09-13 RX ORDER — CLONIDINE HYDROCHLORIDE 0.2 MG/1
0.1 TABLET ORAL 2 TIMES DAILY
Qty: 60 TABLET | Refills: 3 | Status: ON HOLD | COMMUNITY
Start: 2020-09-13 | End: 2022-04-27

## 2020-09-13 RX ORDER — AMLODIPINE BESYLATE 5 MG/1
2.5 TABLET ORAL DAILY
Qty: 30 TABLET | Refills: 3 | Status: ON HOLD | OUTPATIENT
Start: 2020-09-13 | End: 2022-05-05 | Stop reason: HOSPADM

## 2020-09-13 RX ADMIN — ASPIRIN 81 MG: 81 TABLET, COATED ORAL at 07:43

## 2020-09-13 RX ADMIN — CLOPIDOGREL BISULFATE 75 MG: 75 TABLET ORAL at 07:43

## 2020-09-13 RX ADMIN — ENOXAPARIN SODIUM 40 MG: 40 INJECTION SUBCUTANEOUS at 07:43

## 2020-09-13 RX ADMIN — ACETAMINOPHEN 500 MG: 500 TABLET, FILM COATED ORAL at 05:29

## 2020-09-13 RX ADMIN — CLONIDINE HYDROCHLORIDE 0.1 MG: 0.1 TABLET ORAL at 07:44

## 2020-09-13 RX ADMIN — POTASSIUM CHLORIDE 20 MEQ: 1500 TABLET, EXTENDED RELEASE ORAL at 07:43

## 2020-09-13 RX ADMIN — LISINOPRIL 10 MG: 10 TABLET ORAL at 07:43

## 2020-09-13 RX ADMIN — LEVOTHYROXINE SODIUM 25 MCG: 25 TABLET ORAL at 05:29

## 2020-09-13 RX ADMIN — FUROSEMIDE 20 MG: 20 TABLET ORAL at 07:43

## 2020-09-13 RX ADMIN — ATORVASTATIN CALCIUM 20 MG: 20 TABLET, FILM COATED ORAL at 07:43

## 2020-09-13 ASSESSMENT — PAIN SCALES - GENERAL
PAINLEVEL_OUTOF10: 0
PAINLEVEL_OUTOF10: 0
PAINLEVEL_OUTOF10: 3

## 2020-09-13 ASSESSMENT — PAIN DESCRIPTION - ONSET: ONSET: PROGRESSIVE

## 2020-09-13 ASSESSMENT — PAIN - FUNCTIONAL ASSESSMENT: PAIN_FUNCTIONAL_ASSESSMENT: PREVENTS OR INTERFERES SOME ACTIVE ACTIVITIES AND ADLS

## 2020-09-13 ASSESSMENT — PAIN DESCRIPTION - PROGRESSION: CLINICAL_PROGRESSION: NOT CHANGED

## 2020-09-13 ASSESSMENT — PAIN DESCRIPTION - LOCATION: LOCATION: LEG

## 2020-09-13 ASSESSMENT — PAIN DESCRIPTION - PAIN TYPE: TYPE: CHRONIC PAIN

## 2020-09-13 ASSESSMENT — PAIN DESCRIPTION - FREQUENCY: FREQUENCY: CONTINUOUS

## 2020-09-13 ASSESSMENT — PAIN DESCRIPTION - ORIENTATION: ORIENTATION: RIGHT;LEFT

## 2020-11-12 ENCOUNTER — HOSPITAL ENCOUNTER (OUTPATIENT)
Age: 85
Discharge: HOME | End: 2020-11-12
Payer: MEDICARE

## 2020-11-12 LAB
ALBUMIN SERPL-MCNC: 4.3 G/DL (ref 3.5–5.2)
ALP BLD-CCNC: 89 U/L (ref 35–104)
ALT SERPL-CCNC: 11 U/L (ref 0–32)
ANION GAP SERPL CALCULATED.3IONS-SCNC: 10 MMOL/L (ref 7–16)
AST SERPL-CCNC: 25 U/L (ref 0–31)
BASOPHILS ABSOLUTE: 0.04 E9/L (ref 0–0.2)
BASOPHILS RELATIVE PERCENT: 0.8 % (ref 0–2)
BILIRUB SERPL-MCNC: 0.5 MG/DL (ref 0–1.2)
BUN BLDV-MCNC: 21 MG/DL (ref 8–23)
CALCIUM SERPL-MCNC: 9.6 MG/DL (ref 8.6–10.2)
CHLORIDE BLD-SCNC: 97 MMOL/L (ref 98–107)
CHOLESTEROL, TOTAL: 206 MG/DL (ref 0–199)
CO2: 32 MMOL/L (ref 22–29)
CREAT SERPL-MCNC: 1 MG/DL (ref 0.5–1)
EOSINOPHILS ABSOLUTE: 0.31 E9/L (ref 0.05–0.5)
EOSINOPHILS RELATIVE PERCENT: 6.2 % (ref 0–6)
GFR AFRICAN AMERICAN: >60
GFR NON-AFRICAN AMERICAN: 52 ML/MIN/1.73
GLUCOSE BLD-MCNC: 102 MG/DL (ref 74–99)
HCT VFR BLD CALC: 43.3 % (ref 34–48)
HDLC SERPL-MCNC: 87 MG/DL
HEMOGLOBIN: 13.5 G/DL (ref 11.5–15.5)
IMMATURE GRANULOCYTES #: 0.02 E9/L
IMMATURE GRANULOCYTES %: 0.4 % (ref 0–5)
LDL CHOLESTEROL CALCULATED: 97 MG/DL (ref 0–99)
LYMPHOCYTES ABSOLUTE: 1.82 E9/L (ref 1.5–4)
LYMPHOCYTES RELATIVE PERCENT: 36.3 % (ref 20–42)
MCH RBC QN AUTO: 28.4 PG (ref 26–35)
MCHC RBC AUTO-ENTMCNC: 31.2 % (ref 32–34.5)
MCV RBC AUTO: 91.2 FL (ref 80–99.9)
MONOCYTES ABSOLUTE: 0.51 E9/L (ref 0.1–0.95)
MONOCYTES RELATIVE PERCENT: 10.2 % (ref 2–12)
NEUTROPHILS ABSOLUTE: 2.32 E9/L (ref 1.8–7.3)
NEUTROPHILS RELATIVE PERCENT: 46.1 % (ref 43–80)
PDW BLD-RTO: 14.1 FL (ref 11.5–15)
PLATELET # BLD: 187 E9/L (ref 130–450)
PMV BLD AUTO: 11.3 FL (ref 7–12)
POTASSIUM SERPL-SCNC: 4.3 MMOL/L (ref 3.5–5)
RBC # BLD: 4.75 E12/L (ref 3.5–5.5)
SODIUM BLD-SCNC: 139 MMOL/L (ref 132–146)
TOTAL PROTEIN: 7.4 G/DL (ref 6.4–8.3)
TRIGL SERPL-MCNC: 112 MG/DL (ref 0–149)
TSH SERPL DL<=0.05 MIU/L-ACNC: 1.33 UIU/ML (ref 0.27–4.2)
VITAMIN D 25-HYDROXY: 57 NG/ML (ref 30–100)
VLDLC SERPL CALC-MCNC: 22 MG/DL
WBC # BLD: 5 E9/L (ref 4.5–11.5)

## 2020-11-12 PROCEDURE — 80061 LIPID PANEL: CPT

## 2020-11-12 PROCEDURE — 36415 COLL VENOUS BLD VENIPUNCTURE: CPT

## 2020-11-12 PROCEDURE — 82306 VITAMIN D 25 HYDROXY: CPT

## 2020-11-12 PROCEDURE — 80053 COMPREHEN METABOLIC PANEL: CPT

## 2020-11-12 PROCEDURE — 85025 COMPLETE CBC W/AUTO DIFF WBC: CPT

## 2020-11-12 PROCEDURE — 84443 ASSAY THYROID STIM HORMONE: CPT

## 2021-02-02 ENCOUNTER — IMMUNIZATION (OUTPATIENT)
Dept: PRIMARY CARE CLINIC | Age: 86
End: 2021-02-02
Payer: MEDICARE

## 2021-02-02 DIAGNOSIS — Z23 NEED FOR VACCINATION: Primary | ICD-10-CM

## 2021-02-02 PROCEDURE — 91300 COVID-19, PFIZER VACCINE 30MCG/0.3ML DOSE: CPT | Performed by: NURSE PRACTITIONER

## 2021-02-02 PROCEDURE — 0001A COVID-19, PFIZER VACCINE 30MCG/0.3ML DOSE: CPT | Performed by: NURSE PRACTITIONER

## 2021-02-23 ENCOUNTER — IMMUNIZATION (OUTPATIENT)
Dept: PRIMARY CARE CLINIC | Age: 86
End: 2021-02-23
Payer: MEDICARE

## 2021-02-23 PROCEDURE — 91300 COVID-19, PFIZER VACCINE 30MCG/0.3ML DOSE: CPT | Performed by: NURSE PRACTITIONER

## 2021-02-23 PROCEDURE — 0002A COVID-19, PFIZER VACCINE 30MCG/0.3ML DOSE: CPT | Performed by: NURSE PRACTITIONER

## 2021-04-05 ENCOUNTER — HOSPITAL ENCOUNTER (OUTPATIENT)
Age: 86
Discharge: HOME | End: 2021-04-05
Payer: MEDICARE

## 2021-04-05 LAB
ALBUMIN SERPL-MCNC: 4.3 G/DL (ref 3.5–5.2)
ALP BLD-CCNC: 96 U/L (ref 35–104)
ALT SERPL-CCNC: 10 U/L (ref 0–32)
ANION GAP SERPL CALCULATED.3IONS-SCNC: 11 MMOL/L (ref 7–16)
AST SERPL-CCNC: 24 U/L (ref 0–31)
BASOPHILS ABSOLUTE: 0.04 E9/L (ref 0–0.2)
BASOPHILS RELATIVE PERCENT: 0.8 % (ref 0–2)
BILIRUB SERPL-MCNC: 0.6 MG/DL (ref 0–1.2)
BUN BLDV-MCNC: 24 MG/DL (ref 8–23)
CALCIUM SERPL-MCNC: 9.5 MG/DL (ref 8.6–10.2)
CHLORIDE BLD-SCNC: 97 MMOL/L (ref 98–107)
CHOLESTEROL, TOTAL: 187 MG/DL (ref 0–199)
CO2: 33 MMOL/L (ref 22–29)
CREAT SERPL-MCNC: 1.1 MG/DL (ref 0.5–1)
EOSINOPHILS ABSOLUTE: 0.16 E9/L (ref 0.05–0.5)
EOSINOPHILS RELATIVE PERCENT: 3.3 % (ref 0–6)
GFR AFRICAN AMERICAN: 57
GFR NON-AFRICAN AMERICAN: 47 ML/MIN/1.73
GLUCOSE BLD-MCNC: 105 MG/DL (ref 74–99)
HCT VFR BLD CALC: 41.6 % (ref 34–48)
HDLC SERPL-MCNC: 88 MG/DL
HEMOGLOBIN: 13.3 G/DL (ref 11.5–15.5)
IMMATURE GRANULOCYTES #: 0.02 E9/L
IMMATURE GRANULOCYTES %: 0.4 % (ref 0–5)
LDL CHOLESTEROL CALCULATED: 82 MG/DL (ref 0–99)
LYMPHOCYTES ABSOLUTE: 1.77 E9/L (ref 1.5–4)
LYMPHOCYTES RELATIVE PERCENT: 36.2 % (ref 20–42)
MCH RBC QN AUTO: 28.6 PG (ref 26–35)
MCHC RBC AUTO-ENTMCNC: 32 % (ref 32–34.5)
MCV RBC AUTO: 89.5 FL (ref 80–99.9)
MONOCYTES ABSOLUTE: 0.49 E9/L (ref 0.1–0.95)
MONOCYTES RELATIVE PERCENT: 10 % (ref 2–12)
NEUTROPHILS ABSOLUTE: 2.41 E9/L (ref 1.8–7.3)
NEUTROPHILS RELATIVE PERCENT: 49.3 % (ref 43–80)
PDW BLD-RTO: 14.3 FL (ref 11.5–15)
PLATELET # BLD: 170 E9/L (ref 130–450)
PMV BLD AUTO: 11 FL (ref 7–12)
POTASSIUM SERPL-SCNC: 3.9 MMOL/L (ref 3.5–5)
RBC # BLD: 4.65 E12/L (ref 3.5–5.5)
SODIUM BLD-SCNC: 141 MMOL/L (ref 132–146)
TOTAL PROTEIN: 7.2 G/DL (ref 6.4–8.3)
TRIGL SERPL-MCNC: 85 MG/DL (ref 0–149)
TSH SERPL DL<=0.05 MIU/L-ACNC: 1.78 UIU/ML (ref 0.27–4.2)
VITAMIN D 25-HYDROXY: 55 NG/ML (ref 30–100)
VLDLC SERPL CALC-MCNC: 17 MG/DL
WBC # BLD: 4.9 E9/L (ref 4.5–11.5)

## 2021-04-05 PROCEDURE — 85025 COMPLETE CBC W/AUTO DIFF WBC: CPT

## 2021-04-05 PROCEDURE — 82306 VITAMIN D 25 HYDROXY: CPT

## 2021-04-05 PROCEDURE — 80053 COMPREHEN METABOLIC PANEL: CPT

## 2021-04-05 PROCEDURE — 36415 COLL VENOUS BLD VENIPUNCTURE: CPT

## 2021-04-05 PROCEDURE — 80061 LIPID PANEL: CPT

## 2021-04-05 PROCEDURE — 84443 ASSAY THYROID STIM HORMONE: CPT

## 2021-08-06 ENCOUNTER — HOSPITAL ENCOUNTER (OUTPATIENT)
Age: 86
Discharge: HOME | End: 2021-08-06
Payer: MEDICARE

## 2021-08-06 LAB
ALBUMIN SERPL-MCNC: 4.3 G/DL (ref 3.5–5.2)
ALP BLD-CCNC: 102 U/L (ref 35–104)
ALT SERPL-CCNC: 10 U/L (ref 0–32)
ANION GAP SERPL CALCULATED.3IONS-SCNC: 11 MMOL/L (ref 7–16)
AST SERPL-CCNC: 21 U/L (ref 0–31)
BILIRUB SERPL-MCNC: 0.4 MG/DL (ref 0–1.2)
BUN BLDV-MCNC: 31 MG/DL (ref 6–23)
CALCIUM SERPL-MCNC: 9.5 MG/DL (ref 8.6–10.2)
CHLORIDE BLD-SCNC: 102 MMOL/L (ref 98–107)
CO2: 32 MMOL/L (ref 22–29)
CREAT SERPL-MCNC: 1 MG/DL (ref 0.5–1)
GFR AFRICAN AMERICAN: >60
GFR NON-AFRICAN AMERICAN: 52 ML/MIN/1.73
GLUCOSE BLD-MCNC: 111 MG/DL (ref 74–99)
HCT VFR BLD CALC: 41.1 % (ref 34–48)
HEMOGLOBIN: 13.1 G/DL (ref 11.5–15.5)
INR BLD: 0.9
MCH RBC QN AUTO: 29 PG (ref 26–35)
MCHC RBC AUTO-ENTMCNC: 31.9 % (ref 32–34.5)
MCV RBC AUTO: 91.1 FL (ref 80–99.9)
PDW BLD-RTO: 13.8 FL (ref 11.5–15)
PLATELET # BLD: 160 E9/L (ref 130–450)
PMV BLD AUTO: 11 FL (ref 7–12)
POTASSIUM SERPL-SCNC: 4.4 MMOL/L (ref 3.5–5)
PROTHROMBIN TIME: 11 SEC (ref 9.3–12.4)
RBC # BLD: 4.51 E12/L (ref 3.5–5.5)
SODIUM BLD-SCNC: 145 MMOL/L (ref 132–146)
TOTAL PROTEIN: 6.9 G/DL (ref 6.4–8.3)
WBC # BLD: 5.1 E9/L (ref 4.5–11.5)

## 2021-08-06 PROCEDURE — 80053 COMPREHEN METABOLIC PANEL: CPT

## 2021-08-06 PROCEDURE — 85027 COMPLETE CBC AUTOMATED: CPT

## 2021-08-06 PROCEDURE — 36415 COLL VENOUS BLD VENIPUNCTURE: CPT

## 2021-08-06 PROCEDURE — 85610 PROTHROMBIN TIME: CPT

## 2022-01-14 ENCOUNTER — HOSPITAL ENCOUNTER (OUTPATIENT)
Age: 87
Discharge: HOME | End: 2022-01-14
Payer: MEDICARE

## 2022-01-14 ENCOUNTER — HOSPITAL ENCOUNTER (OUTPATIENT)
Dept: CT IMAGING | Age: 87
Discharge: HOME | End: 2022-01-14
Payer: MEDICARE

## 2022-01-14 DIAGNOSIS — R91.8 HILAR MASS: ICD-10-CM

## 2022-01-14 LAB
BUN BLDV-MCNC: 13 MG/DL (ref 6–23)
CREAT SERPL-MCNC: 1.1 MG/DL (ref 0.5–1)
GFR AFRICAN AMERICAN: 56
GFR NON-AFRICAN AMERICAN: 47 ML/MIN/1.73

## 2022-01-14 PROCEDURE — 82565 ASSAY OF CREATININE: CPT

## 2022-01-14 PROCEDURE — 36415 COLL VENOUS BLD VENIPUNCTURE: CPT

## 2022-01-14 PROCEDURE — 6360000004 HC RX CONTRAST MEDICATION: Performed by: RADIOLOGY

## 2022-01-14 PROCEDURE — 84520 ASSAY OF UREA NITROGEN: CPT

## 2022-01-14 PROCEDURE — 71260 CT THORAX DX C+: CPT

## 2022-01-14 RX ADMIN — IOPAMIDOL 75 ML: 755 INJECTION, SOLUTION INTRAVENOUS at 11:47

## 2022-03-11 ENCOUNTER — HOSPITAL ENCOUNTER (OUTPATIENT)
Dept: CT IMAGING | Age: 87
Discharge: HOME | End: 2022-03-11
Payer: MEDICARE

## 2022-03-11 DIAGNOSIS — R91.8 HILAR MASS: ICD-10-CM

## 2022-03-11 PROCEDURE — 71260 CT THORAX DX C+: CPT

## 2022-03-11 PROCEDURE — 6360000004 HC RX CONTRAST MEDICATION: Performed by: RADIOLOGY

## 2022-03-11 RX ADMIN — IOPAMIDOL 40 ML: 755 INJECTION, SOLUTION INTRAVENOUS at 15:02

## 2022-04-19 ENCOUNTER — HOSPITAL ENCOUNTER (OUTPATIENT)
Age: 87
Discharge: HOME | End: 2022-04-19
Payer: MEDICARE

## 2022-04-19 LAB
ALBUMIN SERPL-MCNC: 4 G/DL (ref 3.5–5.2)
ALP BLD-CCNC: 90 U/L (ref 35–104)
ALT SERPL-CCNC: 16 U/L (ref 0–32)
ANION GAP SERPL CALCULATED.3IONS-SCNC: 12 MMOL/L (ref 7–16)
AST SERPL-CCNC: 25 U/L (ref 0–31)
BASOPHILS ABSOLUTE: 0.05 E9/L (ref 0–0.2)
BASOPHILS RELATIVE PERCENT: 0.9 % (ref 0–2)
BILIRUB SERPL-MCNC: 0.5 MG/DL (ref 0–1.2)
BUN BLDV-MCNC: 20 MG/DL (ref 6–23)
CALCIUM SERPL-MCNC: 9.2 MG/DL (ref 8.6–10.2)
CHLORIDE BLD-SCNC: 93 MMOL/L (ref 98–107)
CHOLESTEROL, FASTING: 183 MG/DL (ref 0–199)
CO2: 31 MMOL/L (ref 22–29)
CREAT SERPL-MCNC: 1.2 MG/DL (ref 0.5–1)
EOSINOPHILS ABSOLUTE: 0.27 E9/L (ref 0.05–0.5)
EOSINOPHILS RELATIVE PERCENT: 4.8 % (ref 0–6)
GFR AFRICAN AMERICAN: 51
GFR NON-AFRICAN AMERICAN: 42 ML/MIN/1.73
GLUCOSE FASTING: 113 MG/DL (ref 74–99)
HCT VFR BLD CALC: 39.7 % (ref 34–48)
HDLC SERPL-MCNC: 95 MG/DL
HEMOGLOBIN: 12 G/DL (ref 11.5–15.5)
IMMATURE GRANULOCYTES #: 0.02 E9/L
IMMATURE GRANULOCYTES %: 0.4 % (ref 0–5)
LDL CHOLESTEROL CALCULATED: 71 MG/DL (ref 0–99)
LYMPHOCYTES ABSOLUTE: 1.13 E9/L (ref 1.5–4)
LYMPHOCYTES RELATIVE PERCENT: 20 % (ref 20–42)
MCH RBC QN AUTO: 25.8 PG (ref 26–35)
MCHC RBC AUTO-ENTMCNC: 30.2 % (ref 32–34.5)
MCV RBC AUTO: 85.4 FL (ref 80–99.9)
MONOCYTES ABSOLUTE: 0.72 E9/L (ref 0.1–0.95)
MONOCYTES RELATIVE PERCENT: 12.8 % (ref 2–12)
NEUTROPHILS ABSOLUTE: 3.45 E9/L (ref 1.8–7.3)
NEUTROPHILS RELATIVE PERCENT: 61.1 % (ref 43–80)
PDW BLD-RTO: 18.5 FL (ref 11.5–15)
PLATELET # BLD: 216 E9/L (ref 130–450)
PMV BLD AUTO: 10.8 FL (ref 7–12)
POTASSIUM SERPL-SCNC: 4.2 MMOL/L (ref 3.5–5)
RBC # BLD: 4.65 E12/L (ref 3.5–5.5)
SODIUM BLD-SCNC: 136 MMOL/L (ref 132–146)
TOTAL PROTEIN: 7.5 G/DL (ref 6.4–8.3)
TRIGLYCERIDE, FASTING: 84 MG/DL (ref 0–149)
TSH SERPL DL<=0.05 MIU/L-ACNC: 2.69 UIU/ML (ref 0.27–4.2)
VITAMIN D 25-HYDROXY: 72 NG/ML (ref 30–100)
VLDLC SERPL CALC-MCNC: 17 MG/DL
WBC # BLD: 5.6 E9/L (ref 4.5–11.5)

## 2022-04-19 PROCEDURE — 84443 ASSAY THYROID STIM HORMONE: CPT

## 2022-04-19 PROCEDURE — 82306 VITAMIN D 25 HYDROXY: CPT

## 2022-04-19 PROCEDURE — 36415 COLL VENOUS BLD VENIPUNCTURE: CPT

## 2022-04-19 PROCEDURE — 80053 COMPREHEN METABOLIC PANEL: CPT

## 2022-04-19 PROCEDURE — 80061 LIPID PANEL: CPT

## 2022-04-19 PROCEDURE — 85025 COMPLETE CBC W/AUTO DIFF WBC: CPT

## 2022-04-26 ENCOUNTER — APPOINTMENT (OUTPATIENT)
Dept: ULTRASOUND IMAGING | Age: 87
DRG: 193 | End: 2022-04-26
Payer: MEDICARE

## 2022-04-26 ENCOUNTER — APPOINTMENT (OUTPATIENT)
Dept: GENERAL RADIOLOGY | Age: 87
DRG: 193 | End: 2022-04-26
Payer: MEDICARE

## 2022-04-26 ENCOUNTER — HOSPITAL ENCOUNTER (INPATIENT)
Age: 87
LOS: 9 days | Discharge: NURSING, REHAB OR LONG-TERM CARE | DRG: 193 | End: 2022-05-05
Attending: STUDENT IN AN ORGANIZED HEALTH CARE EDUCATION/TRAINING PROGRAM | Admitting: INTERNAL MEDICINE
Payer: MEDICARE

## 2022-04-26 DIAGNOSIS — I50.9 CHRONIC CONGESTIVE HEART FAILURE, UNSPECIFIED HEART FAILURE TYPE (HCC): Primary | ICD-10-CM

## 2022-04-26 DIAGNOSIS — J96.01 ACUTE RESPIRATORY FAILURE WITH HYPOXIA (HCC): ICD-10-CM

## 2022-04-26 LAB
ALBUMIN SERPL-MCNC: 3.6 G/DL (ref 3.5–5.2)
ALP BLD-CCNC: 119 U/L (ref 35–104)
ALT SERPL-CCNC: 22 U/L (ref 0–32)
ANION GAP SERPL CALCULATED.3IONS-SCNC: 12 MMOL/L (ref 7–16)
ANISOCYTOSIS: ABNORMAL
AST SERPL-CCNC: 34 U/L (ref 0–31)
BASOPHILS ABSOLUTE: 0.03 E9/L (ref 0–0.2)
BASOPHILS RELATIVE PERCENT: 0.2 % (ref 0–2)
BILIRUB SERPL-MCNC: 0.4 MG/DL (ref 0–1.2)
BUN BLDV-MCNC: 29 MG/DL (ref 6–23)
CALCIUM SERPL-MCNC: 9.1 MG/DL (ref 8.6–10.2)
CHLORIDE BLD-SCNC: 97 MMOL/L (ref 98–107)
CO2: 29 MMOL/L (ref 22–29)
CREAT SERPL-MCNC: 1.4 MG/DL (ref 0.5–1)
EOSINOPHILS ABSOLUTE: 0.07 E9/L (ref 0.05–0.5)
EOSINOPHILS RELATIVE PERCENT: 0.5 % (ref 0–6)
GFR AFRICAN AMERICAN: 43
GFR NON-AFRICAN AMERICAN: 35 ML/MIN/1.73
GLUCOSE BLD-MCNC: 160 MG/DL (ref 74–99)
HCT VFR BLD CALC: 33 % (ref 34–48)
HEMOGLOBIN: 10.2 G/DL (ref 11.5–15.5)
HYPOCHROMIA: ABNORMAL
IMMATURE GRANULOCYTES #: 0.07 E9/L
IMMATURE GRANULOCYTES %: 0.5 % (ref 0–5)
INFLUENZA A: NOT DETECTED
INFLUENZA B: NOT DETECTED
IRON SATURATION: 8 % (ref 15–50)
IRON: 21 MCG/DL (ref 37–145)
LYMPHOCYTES ABSOLUTE: 0.84 E9/L (ref 1.5–4)
LYMPHOCYTES RELATIVE PERCENT: 6.2 % (ref 20–42)
MAGNESIUM: 1.9 MG/DL (ref 1.6–2.6)
MCH RBC QN AUTO: 25.9 PG (ref 26–35)
MCHC RBC AUTO-ENTMCNC: 30.9 % (ref 32–34.5)
MCV RBC AUTO: 83.8 FL (ref 80–99.9)
MONOCYTES ABSOLUTE: 1.8 E9/L (ref 0.1–0.95)
MONOCYTES RELATIVE PERCENT: 13.2 % (ref 2–12)
NEUTROPHILS ABSOLUTE: 10.82 E9/L (ref 1.8–7.3)
NEUTROPHILS RELATIVE PERCENT: 79.4 % (ref 43–80)
OVALOCYTES: ABNORMAL
PDW BLD-RTO: 18.3 FL (ref 11.5–15)
PLATELET # BLD: 221 E9/L (ref 130–450)
PMV BLD AUTO: 10.6 FL (ref 7–12)
POIKILOCYTES: ABNORMAL
POLYCHROMASIA: ABNORMAL
POTASSIUM SERPL-SCNC: 4.1 MMOL/L (ref 3.5–5)
PRO-BNP: 4727 PG/ML (ref 0–450)
PROCALCITONIN: 0.35 NG/ML (ref 0–0.08)
RBC # BLD: 3.94 E12/L (ref 3.5–5.5)
SARS-COV-2 RNA, RT PCR: NOT DETECTED
SODIUM BLD-SCNC: 138 MMOL/L (ref 132–146)
T4 FREE: 1.43 NG/DL (ref 0.93–1.7)
TARGET CELLS: ABNORMAL
TOTAL IRON BINDING CAPACITY: 260 MCG/DL (ref 250–450)
TOTAL PROTEIN: 6.9 G/DL (ref 6.4–8.3)
TROPONIN, HIGH SENSITIVITY: 44 NG/L (ref 0–9)
TROPONIN, HIGH SENSITIVITY: 45 NG/L (ref 0–9)
TSH SERPL DL<=0.05 MIU/L-ACNC: 0.82 UIU/ML (ref 0.27–4.2)
WBC # BLD: 13.6 E9/L (ref 4.5–11.5)

## 2022-04-26 PROCEDURE — 83540 ASSAY OF IRON: CPT

## 2022-04-26 PROCEDURE — 83880 ASSAY OF NATRIURETIC PEPTIDE: CPT

## 2022-04-26 PROCEDURE — 85025 COMPLETE CBC W/AUTO DIFF WBC: CPT

## 2022-04-26 PROCEDURE — 2580000003 HC RX 258

## 2022-04-26 PROCEDURE — 96374 THER/PROPH/DIAG INJ IV PUSH: CPT

## 2022-04-26 PROCEDURE — 93005 ELECTROCARDIOGRAM TRACING: CPT | Performed by: PHYSICIAN ASSISTANT

## 2022-04-26 PROCEDURE — 84484 ASSAY OF TROPONIN QUANT: CPT

## 2022-04-26 PROCEDURE — 84443 ASSAY THYROID STIM HORMONE: CPT

## 2022-04-26 PROCEDURE — 6360000002 HC RX W HCPCS

## 2022-04-26 PROCEDURE — 6370000000 HC RX 637 (ALT 250 FOR IP)

## 2022-04-26 PROCEDURE — 83550 IRON BINDING TEST: CPT

## 2022-04-26 PROCEDURE — 71045 X-RAY EXAM CHEST 1 VIEW: CPT

## 2022-04-26 PROCEDURE — 99285 EMERGENCY DEPT VISIT HI MDM: CPT

## 2022-04-26 PROCEDURE — 80053 COMPREHEN METABOLIC PANEL: CPT

## 2022-04-26 PROCEDURE — 87636 SARSCOV2 & INF A&B AMP PRB: CPT

## 2022-04-26 PROCEDURE — 2140000000 HC CCU INTERMEDIATE R&B

## 2022-04-26 PROCEDURE — 84439 ASSAY OF FREE THYROXINE: CPT

## 2022-04-26 PROCEDURE — 93970 EXTREMITY STUDY: CPT

## 2022-04-26 PROCEDURE — 6360000002 HC RX W HCPCS: Performed by: STUDENT IN AN ORGANIZED HEALTH CARE EDUCATION/TRAINING PROGRAM

## 2022-04-26 PROCEDURE — 83735 ASSAY OF MAGNESIUM: CPT

## 2022-04-26 PROCEDURE — 93971 EXTREMITY STUDY: CPT | Performed by: RADIOLOGY

## 2022-04-26 PROCEDURE — 84145 PROCALCITONIN (PCT): CPT

## 2022-04-26 RX ORDER — ACETAMINOPHEN 325 MG/1
650 TABLET ORAL EVERY 6 HOURS PRN
Status: DISCONTINUED | OUTPATIENT
Start: 2022-04-26 | End: 2022-05-05 | Stop reason: HOSPADM

## 2022-04-26 RX ORDER — FUROSEMIDE 10 MG/ML
20 INJECTION INTRAMUSCULAR; INTRAVENOUS 2 TIMES DAILY
Status: DISCONTINUED | OUTPATIENT
Start: 2022-04-26 | End: 2022-04-26

## 2022-04-26 RX ORDER — ROPINIROLE 2 MG/1
4 TABLET, FILM COATED ORAL NIGHTLY
Status: DISCONTINUED | OUTPATIENT
Start: 2022-04-26 | End: 2022-05-05 | Stop reason: HOSPADM

## 2022-04-26 RX ORDER — LEVOTHYROXINE SODIUM 0.03 MG/1
25 TABLET ORAL DAILY
Status: DISCONTINUED | OUTPATIENT
Start: 2022-04-26 | End: 2022-05-05 | Stop reason: HOSPADM

## 2022-04-26 RX ORDER — SODIUM CHLORIDE 0.9 % (FLUSH) 0.9 %
5-40 SYRINGE (ML) INJECTION EVERY 12 HOURS SCHEDULED
Status: DISCONTINUED | OUTPATIENT
Start: 2022-04-26 | End: 2022-05-03 | Stop reason: SDUPTHER

## 2022-04-26 RX ORDER — FUROSEMIDE 10 MG/ML
20 INJECTION INTRAMUSCULAR; INTRAVENOUS 2 TIMES DAILY
Status: DISCONTINUED | OUTPATIENT
Start: 2022-04-27 | End: 2022-04-29

## 2022-04-26 RX ORDER — SODIUM CHLORIDE 9 MG/ML
INJECTION, SOLUTION INTRAVENOUS PRN
Status: DISCONTINUED | OUTPATIENT
Start: 2022-04-26 | End: 2022-05-03 | Stop reason: SDUPTHER

## 2022-04-26 RX ORDER — POLYETHYLENE GLYCOL 3350 17 G/17G
17 POWDER, FOR SOLUTION ORAL DAILY PRN
Status: DISCONTINUED | OUTPATIENT
Start: 2022-04-26 | End: 2022-05-05 | Stop reason: HOSPADM

## 2022-04-26 RX ORDER — HEPARIN SODIUM 10000 [USP'U]/ML
5000 INJECTION, SOLUTION INTRAVENOUS; SUBCUTANEOUS EVERY 8 HOURS SCHEDULED
Status: DISCONTINUED | OUTPATIENT
Start: 2022-04-26 | End: 2022-05-05 | Stop reason: HOSPADM

## 2022-04-26 RX ORDER — ACETAMINOPHEN 650 MG/1
650 SUPPOSITORY RECTAL EVERY 6 HOURS PRN
Status: DISCONTINUED | OUTPATIENT
Start: 2022-04-26 | End: 2022-05-05 | Stop reason: HOSPADM

## 2022-04-26 RX ORDER — OMEGA-3S/DHA/EPA/FISH OIL/D3 300MG-1000
400 CAPSULE ORAL DAILY
Status: DISCONTINUED | OUTPATIENT
Start: 2022-04-27 | End: 2022-05-05 | Stop reason: HOSPADM

## 2022-04-26 RX ORDER — AMLODIPINE BESYLATE 10 MG/1
10 TABLET ORAL DAILY
Status: DISCONTINUED | OUTPATIENT
Start: 2022-04-27 | End: 2022-05-05 | Stop reason: HOSPADM

## 2022-04-26 RX ORDER — ATORVASTATIN CALCIUM 20 MG/1
20 TABLET, FILM COATED ORAL DAILY
Status: DISCONTINUED | OUTPATIENT
Start: 2022-04-26 | End: 2022-05-05 | Stop reason: HOSPADM

## 2022-04-26 RX ORDER — CLONIDINE HYDROCHLORIDE 0.1 MG/1
0.1 TABLET ORAL 2 TIMES DAILY
Status: DISCONTINUED | OUTPATIENT
Start: 2022-04-26 | End: 2022-05-05 | Stop reason: HOSPADM

## 2022-04-26 RX ORDER — CALCIUM POLYCARBOPHIL 625 MG 625 MG/1
625 TABLET ORAL 2 TIMES DAILY
Status: DISCONTINUED | OUTPATIENT
Start: 2022-04-26 | End: 2022-05-05 | Stop reason: HOSPADM

## 2022-04-26 RX ORDER — ONDANSETRON 4 MG/1
4 TABLET, ORALLY DISINTEGRATING ORAL EVERY 8 HOURS PRN
Status: DISCONTINUED | OUTPATIENT
Start: 2022-04-26 | End: 2022-05-05 | Stop reason: HOSPADM

## 2022-04-26 RX ORDER — LANOLIN ALCOHOL/MO/W.PET/CERES
3 CREAM (GRAM) TOPICAL NIGHTLY PRN
Status: DISCONTINUED | OUTPATIENT
Start: 2022-04-26 | End: 2022-05-05 | Stop reason: HOSPADM

## 2022-04-26 RX ORDER — METOPROLOL SUCCINATE 25 MG/1
25 TABLET, EXTENDED RELEASE ORAL DAILY
Status: DISCONTINUED | OUTPATIENT
Start: 2022-04-26 | End: 2022-04-26

## 2022-04-26 RX ORDER — FUROSEMIDE 10 MG/ML
80 INJECTION INTRAMUSCULAR; INTRAVENOUS ONCE
Status: COMPLETED | OUTPATIENT
Start: 2022-04-26 | End: 2022-04-26

## 2022-04-26 RX ORDER — ONDANSETRON 2 MG/ML
4 INJECTION INTRAMUSCULAR; INTRAVENOUS EVERY 6 HOURS PRN
Status: DISCONTINUED | OUTPATIENT
Start: 2022-04-26 | End: 2022-05-05 | Stop reason: HOSPADM

## 2022-04-26 RX ORDER — CLOPIDOGREL BISULFATE 75 MG/1
75 TABLET ORAL DAILY
Status: DISCONTINUED | OUTPATIENT
Start: 2022-04-26 | End: 2022-05-05 | Stop reason: HOSPADM

## 2022-04-26 RX ORDER — SODIUM CHLORIDE 0.9 % (FLUSH) 0.9 %
5-40 SYRINGE (ML) INJECTION PRN
Status: DISCONTINUED | OUTPATIENT
Start: 2022-04-26 | End: 2022-05-03 | Stop reason: SDUPTHER

## 2022-04-26 RX ORDER — ASPIRIN 81 MG/1
81 TABLET ORAL DAILY
Status: DISCONTINUED | OUTPATIENT
Start: 2022-04-27 | End: 2022-05-05 | Stop reason: HOSPADM

## 2022-04-26 RX ADMIN — CLOPIDOGREL BISULFATE 75 MG: 75 TABLET ORAL at 23:16

## 2022-04-26 RX ADMIN — ROPINIROLE HYDROCHLORIDE 4 MG: 2 TABLET, FILM COATED ORAL at 23:16

## 2022-04-26 RX ADMIN — CALCIUM POLYCARBOPHIL 625 MG: 625 TABLET, FILM COATED ORAL at 23:16

## 2022-04-26 RX ADMIN — FUROSEMIDE 80 MG: 10 INJECTION, SOLUTION INTRAMUSCULAR; INTRAVENOUS at 15:42

## 2022-04-26 RX ADMIN — LEVOTHYROXINE SODIUM 25 MCG: 0.03 TABLET ORAL at 23:16

## 2022-04-26 RX ADMIN — HEPARIN SODIUM 5000 UNITS: 10000 INJECTION INTRAVENOUS; SUBCUTANEOUS at 23:16

## 2022-04-26 RX ADMIN — CLONIDINE HYDROCHLORIDE 0.1 MG: 0.1 TABLET ORAL at 23:16

## 2022-04-26 RX ADMIN — Medication 10 ML: at 23:20

## 2022-04-26 ASSESSMENT — PAIN - FUNCTIONAL ASSESSMENT
PAIN_FUNCTIONAL_ASSESSMENT: NONE - DENIES PAIN

## 2022-04-26 ASSESSMENT — PAIN SCALES - GENERAL: PAINLEVEL_OUTOF10: 0

## 2022-04-26 NOTE — LETTER
[address] Encounter Date/Time: 2022 [address] [place] [address] Account: [de-identified]    MRN: [id number]    Patient: Merrilyn Councilman    Contact Serial #: [id number]      ENCOUNTER          Patient Class: I Private Enc? No Unit RM BD: [address] [facility] [id number]/6502-B   Hospital Service: MED   Encounter DX: Heart failure ([address] [I50*   ADM Provider: Toby Hoffmann DO   Procedure:     ATT Provider: Rohith Ashley MD   REF Provider:        Admission DX: Heart failure ([address] Acute respiratory failure with hypoxia ([address] Chronic congestive heart failure, unspecified heart failure type ([address] and DX codes: I50.9, J96.01, I50.9      PATIENT                 Name: Merrilyn Councilman : 1931 (89 yrs)   Address: [place] [address] Sex: Female   City: [place] [facility] [address]         Marital Status:    Employer: [employer]         Confucianist: [address]   Primary Care Provider: Tosha Blanc DO         Primary Phone: [phone]   EMERGENCY CONTACT   Contact Name Legal Guardian? Relationship to Patient Home Phone Work Phone   1. Jimena Velez  2. Catalina Morfin No    Child  Other [phone]              GUARANTOR            Guarantor: Merrilyn Councilman     : 1931   Address: [address] [place] [address] Sex: Female     [place] [address]     Relation to Patient: Self       Home Phone: [phone]   Guarantor ID: [id number]       Work Phone:     Guarantor Employer: RETIRED         Status: RETIRED      COVERAGE        PRIMARY INSURANCE   Payor: MEDICARE Plan: MEDICARE PART A AND B   Payor Address: [facility],  [facility] 99, [facility] 1284       Group Number:   Insurance Type: INDEMNITY   Subscriber Name: Edith Webb : 1931   Subscriber ID: [id number] Serafin Osorio. Rel. to Sub: Self   SECONDARY INSURANCE   Payor: UNITED HE[employer] Plan: Rithmio Address:  [facility] [id number], [place], [place] [address]          Group Number:    [address] Medicaid  CERTIFICATION OF NECESSITY  FOR NON-EMERGENCY TRANSPORTATION   BY GROUND AMBULANCE      Individual Information   1. Name: Amanda Schneider 2. [place] Medicaid Billing Number:    3. Address: [address] [address]      Transportation Provider Information   4. Provider Name:    5. [place] Medicaid Provider Number:  National Provider Identifier (NPI):      Certification  7. Criteria:  During transport, this individual requires:  [x] Medical treatment or continuous     supervision by an EMT. [x] The administration or regulation of oxygen by another person. [] Supervised protective restraint. 8. Period Beginning Date: 22   9. Length  [x] Not more than 5 day(s)  [] One Year     Additional Information Relevant to Certification   10. Comments or Explanations, If Necessary or Appropriate     CHF, pneumonia, hypoxic resp failure,m decreased strength, balance, and coordination impairing functional ability     Certifying Practitioner Information   11. Name of Practitioner:    12. [place] Medicaid Provider Number, If Applicable:  [address] Provider Identifier (NPI):      Signature Information   14. Date of Signature: 22 15. Name of Person Signing: Jovany AVELAR   16. Signature and Professional Designation: Electronically signed by CIRO Vidal LSW on 2022 at 10:21 AM       [facility] [phone]  Rev. 2015 Plan N Insurance Type: [address] Name: Flavia Guy : 1931   Subscriber ID: [id number] Pat.  Rel. to Sub: SELF           CSN: [id number]

## 2022-04-26 NOTE — H&P
Hospitalist History & Physical      PCP: Dania Rosario DO    Date of Service: Pt seen/examined on 4/26/2022     admitted to Inpatient with expected LOS greater than two midnights due to medical therapy. Chief Complaint:  had concerns including Shortness of Breath (Hx of chf sent in by PCP for SOB. patient 85% on RA . Placed on 4 L NC. also C/O bilateral leg swelling) and Leg Swelling. History of Present Illness:    Ms. Carolina Cassidy, a 80y.o. year old female  who  has a past medical history of Arthritis, Cataract, Closed subtrochanteric fracture of left femur with delayed healing, Heart murmur, History of cardiovascular stress test, Hyperlipidemia, and Hypertension. Was recently seen by her primary care provider complaint of shortness of breath, and lower extremity edema. Her primary care provider recommended her come to the ER for further evaluation and diuresis. Patient lives with her daughter and up until about 3 weeks ago was very active and able to ambulate well around her home with a walker. Patient's daughter states over the last 3 weeks her shortness of breath has gotten progressively worse, to the point where she can no longer walk up the steps without sitting down at least twice for a break. Patient states that she cannot lay flat at baseline, but the shortness of breath is exacerbated by laying flat. Additionally patient states that her legs have become progressively more swollen. Patient denies chest pain, night sweats, fever, aches, admits to chills and nausea. Patient's daughter also states that patient had TAVR in October 2021 at [facility] [address] [facility]  ER course included an EKG that was normal sinus rhythm with no evidence of ischemia or infarct. Laboratory studies revealed a mild acute kidney injury with a creatinine of 1.4, up from previous. Elevated BNP at 4700. Troponin of 45. Mild leukocytosis at 13. 6.   Chest x-ray shows multifocal bilateral pneumonia, right greater than left. Cardiomegaly. Past Medical History:        Diagnosis Date    Arthritis     Cataract     Closed subtrochanteric fracture of left femur with delayed healing 5/25/2016    Heart murmur     History of cardiovascular stress test 02/17/2014    lexiscan    Hyperlipidemia     Hypertension        Past Surgical History:        Procedure Laterality Date    APPENDECTOMY      HIP SURGERY      pins in L hip    HIP SURGERY Left 8/20/15    im nail pinning and hardware removal    HYSTERECTOMY      JOINT REPLACEMENT  5/10/2012    total r hip    PELVIC FRACTURE SURGERY  2008    REVISION TOTAL HIP ARTHROPLASTY      R hip 1999    SKIN CANCER EXCISION      facial and        Medications Prior to Admission:      Prior to Admission medications    Medication Sig Start Date End Date Taking? Authorizing Provider   cloNIDine (CATAPRES) 0.2 MG tablet Take 0.5 tablets by mouth 2 times daily 9/13/20   Javier Cease, DO   amLODIPine (NORVASC) 5 MG tablet Take 0.5 tablets by mouth daily 9/13/20   Javier Cease, DO   furosemide (LASIX) 20 MG tablet Take 1 tablet by mouth daily 9/13/20   Javier Cease, DO   atorvastatin (LIPITOR) 20 MG tablet Take 20 mg by mouth daily    Historical Provider, MD Jaja CERVANTES [place] [address] Take by mouth 2 times daily    Historical Provider, MD   clopidogrel (PLAVIX) 75 MG tablet Take 1 tablet by mouth daily When cleared with orthopedics 8/23/15   Bryan Reilly, DO   levothyroxine (SYNTHROID) 25 MCG tablet Take 25 mcg by mouth Daily    Historical Provider, MD   aspirin 81 MG EC tablet Take 1 tablet by mouth daily. 2/19/14   Daniele Amy, DO   rOPINIRole (REQUIP) 1 MG tablet Take 4 mg by mouth nightly     Historical Provider, MD   VITAMIN D, CHOLECALCIFEROL, PO Take 400 mg by mouth daily.  5 pm    Historical Provider, MD       Allergies:  Elemental sulfur, Ibuprofen, and Penicillins    Social History:    RESIDENCE: home  TOBACCO:   reports that she quit smoking about 34 years ago. She quit after 20.00 years of use. She has never used smokeless tobacco.  ETOH:   reports no history of alcohol use. Family History:    Reviewed in detail and negative for DM, CAD, Cancer, CVA. Positive as follows\"  No family history on file. Review of Systems: All bolded are positive; please see HPI  General:  Fever, chills, diaphoresis, fatigue, malaise, night sweats, weight loss  Psychological:  Anxiety, disorientation, hallucinations. ENT:  Epistaxis, headaches, vertigo, visual changes. Cardiovascular:  Chest pain, irregular heartbeats, palpitations, paroxysmal nocturnal dyspnea. Respiratory:  Shortness of breath, coughing, sputum production, hemoptysis, wheezing, orthopnea. Gastrointestinal:  Nausea, vomiting, diarrhea, heartburn, constipation, abdominal pain, hematemesis, hematochezia, melena, acholic stools  Genito-Urinary:  Dysuria, urgency, frequency, hematuria  Musculoskeletal:  Joint pain, joint stiffness, joint swelling, muscle pain  Neurology:  Headache, focal neurological deficits, weakness, numbness, paresthesia  Derm:  Rashes, ulcers, excoriations, bruising  Extremities:  Decreased ROM, peripheral edema, mottling    Physical Exam:  BP (!) 145/48   Pulse 77   Temp 98.5 °F (36.9 °C)   Resp 22   SpO2 96%   General appearance: No apparent distress, appears stated age and cooperative. HEENT: Conjunctivae/corneas clear. Mucous membranes moist.  Neck: Supple. No JVD. Respiratory:  Clear to auscultation bilaterally. Normal respiratory effort. Cardiovascular:  RRR. S1, S2 without MRG. PV: Pulses palpable. No edema. Abdomen: Soft, non-tender, non-distended. +BS  Musculoskeletal: No obvious deformities. Skin: Normal skin color. No rashes or lesions. Good turgor. Neurologic:  Grossly non-focal. Awake, alert, following commands.    Psychiatric: Alert and oriented, thought content appropriate, normal insight and judgement    Reviewed EKG and CXR personally      CBC:   Recent Labs     04/26/22  1454   WBC 13.6*   RBC 3.94   HGB 10.2*   HCT 33.0*   MCV 83.8   RDW 18.3*        BMP:   Recent Labs     04/26/22  1454      K 4.1   CL 97*   CO2 29   BUN 29*   CREATININE 1.4*   MG 1.9     LFT:  Recent Labs     04/26/22  1454   PROT 6.9   ALKPHOS 119*   ALT 22   AST 34*   BILITOT 0.4     CE:  No results for input(s): Erin Beat in the last 72 hours. PT/INR: No results for input(s): INR, APTT in the last 72 hours. BNP: No results for input(s): BNP in the last 72 hours. ESR: No results found for: SEDRATE  CRP: No results found for: CRP  D Dimer: No results found for: DDIMER   Folate and B12: No results found for: BUSMENKU99, No results found for: FOLATE  Lactic Acid: No results found for: LACTA  Thyroid Studies:   Lab Results   Component Value Date    TSH 2.690 04/19/2022       Oupatient labs:  Lab Results   Component Value Date    CHOL 187 04/05/2021    TRIG 85 04/05/2021    HDL 95 04/19/2022    LDLCALC 71 04/19/2022    TSH 2.690 04/19/2022    INR 0.9 08/06/2021    LABA1C 5.9 02/15/2014       Urinalysis:    Lab Results   Component Value Date    NITRU Negative 08/19/2015    WBCUA NONE 08/19/2015    BACTERIA NONE 08/19/2015    RBCUA 10-20 08/19/2015    RBCUA 0-1 02/14/2014    BLOODU SMALL 08/19/2015    SPECGRAV 1.020 08/19/2015    GLUCOSEU Negative 08/19/2015       Imaging:  XR CHEST PORTABLE    Result Date: 4/26/2022  Multifocal bilateral pneumonia, right greater than left. Cardiomegaly.          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    Plan:  · BNP 4700 on presentation  · Echocardiogram in AM  · Start Toprol XL 25 mg  · Daily weights  · Procalcitonin pending  · Strict intake and output  · IV Lasix 20 mg BID  · Monitor electrolytes, especially K  · cardiology consult  · Continue home medications Norvasc 2.5 mg, aspirin 81 mg, Lipitor 20 mg, Catapres 0.1 mg, Plavix 75 mg, Synthroid 25 mg,        Diet: No diet orders on file  Code Status: Prior  Surrogate decision maker confirmed with patient:   Extended Emergency Contact Information  Primary Emergency Contact: Red Siddiqi  Address: [address] [facility] [address] Phone: [phone]  Mobile Phone: [phone]  Relation: Child   needed? No  Secondary Emergency Contact: Betsey Darnell, [address] [facility] Phone: [phone]  Mobile Phone: [phone]  Relation: Other    DVT Prophylaxis: []Lovenox [x]Heparin []PCD [] [address] []Encouraged ambulation  Disposition: []Med/Surg [x] Intermediate [] ICU/CCU  Admit status: [] Observation [x] Inpatient     +++++++++++++++++++++++++++++++++++++++++++++++++  Reyes Villegas [address]  +++++++++++++++++++++++++++++++++++++++++++++++++  NOTE: This report was transcribed using voice recognition software. Every effort was made to ensure accuracy; however, inadvertent computerized transcription errors may be present.

## 2022-04-26 NOTE — ED PROVIDER NOTES
Department of Emergency Medicine   ED  Provider Note  Admit Date/RoomTime: 4/26/2022  2:32 PM  ED Room: 08/08          History of Present Illness:  4/26/22, Time: 3:28 PM EDT  Chief Complaint   Patient presents with    Shortness of Breath     Hx of chf sent in by PCP for SOB. patient 85% on RA . Placed on 4 L NC. also C/O bilateral leg swelling    Leg Swelling     Morelia Myers is a 80 y.o. female presenting to the ED for Shortness of breath. This is been present the past several days. She states it is worse while lying flat. She is unsure whether or not she has a history of heart failure. Of note, the patient was seen by her primary care provider provider and sent in for admission and diuresis. She notes extensive lower extremity edema with exertional shortness of breath. No chest pain. No history of DVT or PE in the past.  The patient does have bilateral lower extremity edema which she feels is up to the thighs. She is on Lasix 20 mg daily as well as Bumex daily although they are unsure of the dose. They have been taking this diligently per her report. Review of Systems:  Review of systems obtained and negative unless stated otherwise above in the HPI.    --------------------------------------------- PAST HISTORY ---------------------------------------------  Past Medical History:  has a past medical history of Arthritis, Cataract, Closed subtrochanteric fracture of left femur with delayed healing, Heart murmur, History of cardiovascular stress test, Hyperlipidemia, and Hypertension. Past Surgical History:  has a past surgical history that includes Revision total hip arthroplasty; Appendectomy; Hysterectomy; hip surgery; Pelvic fracture surgery (2008); joint replacement (5/10/2012); hip surgery (Left, 8/20/15); and Skin cancer excision. Social History:  reports that she quit smoking about 34 years ago. She quit after 20.00 years of use.  She has never used smokeless tobacco. She reports that she does not drink alcohol and does not use drugs. Family History: family history is not on file. . Unless otherwise noted, family history is non contributory    The patients home medications have been reviewed. Allergies: Elemental sulfur, Ibuprofen, and Penicillins    I have reviewed the past medical history, past surgical history, social history, and family history    ---------------------------------------------------PHYSICAL EXAM--------------------------------------    Constitutional: Appears in no distress  Head: Normocephalic, atraumatic  Eyes: Non-icteric slcera, no conjunctival injection  ENT: Moist mucous membranes,  Neck: Trachea midline, no JVD  Respiratory: Labored respirations, slightly tachypneic although improved on oxygen, the patient has bibasilar crackles and scant wheezing throughout  Cardiovascular: Regular rate. Regular rhythm. No murmurs, no gallops, no rubs. Gastrointestinal: Abdomen Soft, Non tender, Non distended. No rebound tenderness, guarding, or rigidity. Extremities: No lower extremity edema  Genitourinary: No CVA tenderness, no suprapubic tenderness  Musculoskeletal: Moves all extremities, no deformity, 3+ lower extremity pitting edema to the knee  Skin: Pink, warm, dry without rash. Neurologic: Alert, symmetric facies, no aphasia    -------------------------------------------------- RESULTS -------------------------------------------------  I have personally reviewed all laboratory and imaging results for this patient. Results are listed below.      LABS: (Lab results interpreted by me)  Results for orders placed or performed during the hospital encounter of 04/26/22   CBC with Auto Differential   Result Value Ref Range    WBC 13.6 (H) 4.5 - 11.5 E9/L    RBC 3.94 3.50 - 5.50 E12/L    Hemoglobin 10.2 (L) 11.5 - 15.5 g/dL    Hematocrit 33.0 (L) 34.0 - 48.0 %    MCV 83.8 80.0 - 99.9 fL    MCH 25.9 (L) 26.0 - 35.0 pg    MCHC 30.9 (L) 32.0 - 34.5 %    RDW 18.3 (H) 11.5 - 15.0 fL    Platelets 989 000 - 140 E9/L    MPV 10.6 7.0 - 12.0 fL    Neutrophils % 79.4 43.0 - 80.0 %    Immature Granulocytes % 0.5 0.0 - 5.0 %    Lymphocytes % 6.2 (L) 20.0 - 42.0 %    Monocytes % 13.2 (H) 2.0 - 12.0 %    Eosinophils % 0.5 0.0 - 6.0 %    Basophils % 0.2 0.0 - 2.0 %    Neutrophils Absolute 10.82 (H) 1.80 - 7.30 E9/L    Immature Granulocytes # 0.07 E9/L    Lymphocytes Absolute 0.84 (L) 1.50 - 4.00 E9/L    Monocytes Absolute 1.80 (H) 0.10 - 0.95 E9/L    Eosinophils Absolute 0.07 0.05 - 0.50 E9/L    Basophils Absolute 0.03 0.00 - 0.20 E9/L    Anisocytosis 1+     Polychromasia 2+     Hypochromia 1+     Poikilocytes 1+     Ovalocytes 1+     Target Cells 1+    Comprehensive Metabolic Panel   Result Value Ref Range    Sodium 138 132 - 146 mmol/L    Potassium 4.1 3.5 - 5.0 mmol/L    Chloride 97 (L) 98 - 107 mmol/L    CO2 29 22 - 29 mmol/L    Anion Gap 12 7 - 16 mmol/L    Glucose 160 (H) 74 - 99 mg/dL    BUN 29 (H) 6 - 23 mg/dL    CREATININE 1.4 (H) 0.5 - 1.0 mg/dL    GFR Non-African American 35 >=60 mL/min/1.73    GFR African American 43     Calcium 9.1 8.6 - 10.2 mg/dL    Total Protein 6.9 6.4 - 8.3 g/dL    Albumin 3.6 3.5 - 5.2 g/dL    Total Bilirubin 0.4 0.0 - 1.2 mg/dL    Alkaline Phosphatase 119 (H) 35 - 104 U/L    ALT 22 0 - 32 U/L    AST 34 (H) 0 - 31 U/L   Troponin   Result Value Ref Range    Troponin, High Sensitivity 45 (H) 0 - 9 ng/L   Brain Natriuretic Peptide   Result Value Ref Range    Pro-BNP 4,727 (H) 0 - 450 pg/mL   Magnesium   Result Value Ref Range    Magnesium 1.9 1.6 - 2.6 mg/dL   ,       RADIOLOGY:  Interpreted by Radiologist unless otherwise specified  XR CHEST PORTABLE   Final Result   Multifocal bilateral pneumonia, right greater than left. Cardiomegaly.                ------------------------- NURSING NOTES AND VITALS REVIEWED ---------------------------   The nursing notes within the ED encounter and vital signs as below have been reviewed by myself  BP (!) 145/48   Pulse 77 Temp 98.5 °F (36.9 °C)   Resp 22   SpO2 96%     Oxygen Saturation Interpretation: Improved after treatment    The patients available past medical records and past encounters were reviewed. ------------------------------ ED COURSE/MEDICAL DECISION MAKING----------------------  Medications   furosemide (LASIX) injection 80 mg (80 mg IntraVENous Given 4/26/22 1542)        Re-Evaluations: This patient's ED course included:a personal history and physicial examination, re-evaluation prior to disposition, multiple bedside re-evaluations, IV medications, cardiac monitoring, continuous pulse oximetry and complex medical decision making and emergency management    This patient has remained hemodynamically stable during their ED course. Consultations:  Internal Medicine    Medical Decision Making:   Patient presents emerged department shortness of breath consistent with likely heart failure. Work-up was ensued for this. Vital signs interpreted myself as hypertensive and hypoxic otherwise normal.    History and physical examination findings consistent with peer exacerbation although ACS, infectious etiologies are also considered. EKG:  EKG is interpreted by myself as ventricular rate of 70 beats minute there is a P wave before every cures Compass cures duration is normal the QT/QTc is normal.  No ST segment elevation or depression. There is no T wave inversion. This EKG is interpreted by myself as normal sinus rhythm no evidence of ischemia or infarct at this time    . labs and imaging reviewed patient has mild acute kidney injury her creatinine 1.4 this is up from previous. Additionally, the patient has significantly elevated BNP at 4700 n  Troponin is mid range at 45 could be related to heart failure exacerbation. She is mild leukocytosis at 13.6 which is nonspecific.   Presentation seems more consistent with heart failure rather than pneumonia but will obtain Procalcitonin and defer to primary team for antibiotics. Plan is to admit for diuresis will defer antibiotics to primary team.  Procalcitonin as well as COVID and flu washes are pending at this time. Critical Care:  Upon my evaluation, this patient had a high probability of imminent or life-threatening deterioration due to acute respiratory failure with hypoxic secondary to heart failure exacerbation, which required my direct attention, intervention, and personal management. I have personally provided 33 minutes of critical care time excluding time spent on separately billable procedures. Time includes review of laboratory data, radiology results, discussion with consultants, and monitoring for potential decompensation. Interventions were performed as documented above. Counseling: The emergency provider has spoken with the patient and discussed todays results, in addition to providing specific details for the plan of care and counseling regarding the diagnosis and prognosis. Questions are answered at this time and they are agreeable with the plan.       --------------------------------- IMPRESSION AND DISPOSITION ---------------------------------    IMPRESSION  1. Chronic congestive heart failure, unspecified heart failure type ([facility]    2. Acute respiratory failure with hypoxia ([facility] [address]        DISPOSITION  Disposition: Admit to telemetry  Patient condition is stable    Dr. Chastity ORDOÑEZ, am the primary provider of record    Chastity Lane DO  Emergency Medicine    NOTE: This report was transcribed using voice recognition software.  Every effort was made to ensure accuracy; however, inadvertent computerized transcription errors may be present         Cristina Jarquin DO  04/26/22 6734

## 2022-04-27 ENCOUNTER — APPOINTMENT (OUTPATIENT)
Dept: GENERAL RADIOLOGY | Age: 87
DRG: 193 | End: 2022-04-27
Payer: MEDICARE

## 2022-04-27 ENCOUNTER — APPOINTMENT (OUTPATIENT)
Dept: CT IMAGING | Age: 87
DRG: 193 | End: 2022-04-27
Payer: MEDICARE

## 2022-04-27 LAB
AADO2: 227.9 MMHG
ALBUMIN SERPL-MCNC: 3.3 G/DL (ref 3.5–5.2)
ALP BLD-CCNC: 154 U/L (ref 35–104)
ALT SERPL-CCNC: 21 U/L (ref 0–32)
AMMONIA: 21 UMOL/L (ref 11–51)
ANION GAP SERPL CALCULATED.3IONS-SCNC: 10 MMOL/L (ref 7–16)
ANION GAP SERPL CALCULATED.3IONS-SCNC: 11 MMOL/L (ref 7–16)
ANISOCYTOSIS: ABNORMAL
AST SERPL-CCNC: 38 U/L (ref 0–31)
B.E.: 3 MMOL/L (ref -3–3)
B.E.: 5.3 MMOL/L (ref -3–3)
BACTERIA: ABNORMAL /HPF
BASOPHILS ABSOLUTE: 0.03 E9/L (ref 0–0.2)
BASOPHILS RELATIVE PERCENT: 0.2 % (ref 0–2)
BILIRUB SERPL-MCNC: 0.4 MG/DL (ref 0–1.2)
BILIRUBIN URINE: NEGATIVE
BLOOD, URINE: ABNORMAL
BUN BLDV-MCNC: 26 MG/DL (ref 6–23)
BUN BLDV-MCNC: 26 MG/DL (ref 6–23)
CALCIUM SERPL-MCNC: 8.5 MG/DL (ref 8.6–10.2)
CALCIUM SERPL-MCNC: 9 MG/DL (ref 8.6–10.2)
CHLORIDE BLD-SCNC: 94 MMOL/L (ref 98–107)
CHLORIDE BLD-SCNC: 94 MMOL/L (ref 98–107)
CHOLESTEROL, TOTAL: 131 MG/DL (ref 0–199)
CLARITY: CLEAR
CO2: 31 MMOL/L (ref 22–29)
CO2: 34 MMOL/L (ref 22–29)
COHB: 0.4 % (ref 0–1.5)
COHB: 0.6 % (ref 0–1.5)
COLOR: YELLOW
CREAT SERPL-MCNC: 1.5 MG/DL (ref 0.5–1)
CREAT SERPL-MCNC: 1.5 MG/DL (ref 0.5–1)
CRITICAL: ABNORMAL
CRITICAL: ABNORMAL
DATE ANALYZED: ABNORMAL
DATE ANALYZED: ABNORMAL
DATE OF COLLECTION: ABNORMAL
DATE OF COLLECTION: ABNORMAL
EKG ATRIAL RATE: 70 BPM
EKG P AXIS: 49 DEGREES
EKG P-R INTERVAL: 176 MS
EKG Q-T INTERVAL: 390 MS
EKG QRS DURATION: 96 MS
EKG QTC CALCULATION (BAZETT): 421 MS
EKG R AXIS: -48 DEGREES
EKG T AXIS: 47 DEGREES
EKG VENTRICULAR RATE: 70 BPM
EOSINOPHILS ABSOLUTE: 0.04 E9/L (ref 0.05–0.5)
EOSINOPHILS RELATIVE PERCENT: 0.3 % (ref 0–6)
FIO2: 60 %
GFR AFRICAN AMERICAN: 39
GFR AFRICAN AMERICAN: 39
GFR NON-AFRICAN AMERICAN: 33 ML/MIN/1.73
GFR NON-AFRICAN AMERICAN: 33 ML/MIN/1.73
GLUCOSE BLD-MCNC: 146 MG/DL (ref 74–99)
GLUCOSE BLD-MCNC: 162 MG/DL (ref 74–99)
GLUCOSE URINE: NEGATIVE MG/DL
HCO3: 32 MMOL/L (ref 22–26)
HCO3: 34.5 MMOL/L (ref 22–26)
HCT VFR BLD CALC: 33.2 % (ref 34–48)
HDLC SERPL-MCNC: 64 MG/DL
HEMOGLOBIN: 9.9 G/DL (ref 11.5–15.5)
HHB: 0.8 % (ref 0–5)
HHB: 3.5 % (ref 0–5)
HYPOCHROMIA: ABNORMAL
IMMATURE GRANULOCYTES #: 0.07 E9/L
IMMATURE GRANULOCYTES %: 0.5 % (ref 0–5)
KETONES, URINE: NEGATIVE MG/DL
LAB: ABNORMAL
LAB: ABNORMAL
LACTIC ACID: 0.7 MMOL/L (ref 0.5–2.2)
LDL CHOLESTEROL CALCULATED: 54 MG/DL (ref 0–99)
LEUKOCYTE ESTERASE, URINE: ABNORMAL
LYMPHOCYTES ABSOLUTE: 0.91 E9/L (ref 1.5–4)
LYMPHOCYTES RELATIVE PERCENT: 6.2 % (ref 20–42)
Lab: ABNORMAL
Lab: ABNORMAL
MAGNESIUM: 1.8 MG/DL (ref 1.6–2.6)
MCH RBC QN AUTO: 25.8 PG (ref 26–35)
MCHC RBC AUTO-ENTMCNC: 29.8 % (ref 32–34.5)
MCV RBC AUTO: 86.5 FL (ref 80–99.9)
METER GLUCOSE: 161 MG/DL (ref 74–99)
METHB: 0.5 % (ref 0–1.5)
METHB: 0.5 % (ref 0–1.5)
MODE: ABNORMAL
MODE: ABNORMAL
MONOCYTES ABSOLUTE: 2.07 E9/L (ref 0.1–0.95)
MONOCYTES RELATIVE PERCENT: 14.1 % (ref 2–12)
NEUTROPHILS ABSOLUTE: 11.59 E9/L (ref 1.8–7.3)
NEUTROPHILS RELATIVE PERCENT: 78.7 % (ref 43–80)
NITRITE, URINE: NEGATIVE
O2 CONTENT: 14.5 ML/DL
O2 CONTENT: 15.6 ML/DL
O2 SATURATION: 96.5 % (ref 92–98.5)
O2 SATURATION: 99.2 % (ref 92–98.5)
O2HB: 95.4 % (ref 94–97)
O2HB: 98.3 % (ref 94–97)
OPERATOR ID: 1721
OPERATOR ID: 2156
OVALOCYTES: ABNORMAL
PATIENT TEMP: 37 C
PATIENT TEMP: 37 C
PCO2: 76.8 MMHG (ref 35–45)
PCO2: 80 MMHG (ref 35–45)
PDW BLD-RTO: 18.3 FL (ref 11.5–15)
PEEP/CPAP: 5 CMH2O
PFO2: 1.67 MMHG/%
PH BLOOD GAS: 7.24 (ref 7.35–7.45)
PH BLOOD GAS: 7.25 (ref 7.35–7.45)
PH UA: 5.5 (ref 5–9)
PLATELET # BLD: 207 E9/L (ref 130–450)
PMV BLD AUTO: 10.8 FL (ref 7–12)
PO2: 100.4 MMHG (ref 75–100)
PO2: 195.7 MMHG (ref 75–100)
POIKILOCYTES: ABNORMAL
POLYCHROMASIA: ABNORMAL
POTASSIUM SERPL-SCNC: 3.9 MMOL/L (ref 3.5–5)
POTASSIUM SERPL-SCNC: 4.14 MMOL/L (ref 3.5–5)
POTASSIUM SERPL-SCNC: 4.2 MMOL/L (ref 3.5–5)
PROTEIN UA: 100 MG/DL
PS: 16 CMH20
RBC # BLD: 3.84 E12/L (ref 3.5–5.5)
RBC UA: >20 /HPF (ref 0–2)
RI(T): 2.27
SODIUM BLD-SCNC: 136 MMOL/L (ref 132–146)
SODIUM BLD-SCNC: 138 MMOL/L (ref 132–146)
SOURCE, BLOOD GAS: ABNORMAL
SOURCE, BLOOD GAS: ABNORMAL
SPECIFIC GRAVITY UA: 1.02 (ref 1–1.03)
TARGET CELLS: ABNORMAL
THB: 10.7 G/DL (ref 11.5–16.5)
THB: 11 G/DL (ref 11.5–16.5)
TIME ANALYZED: 1015
TIME ANALYZED: 627
TOTAL PROTEIN: 6.6 G/DL (ref 6.4–8.3)
TRIGL SERPL-MCNC: 66 MG/DL (ref 0–149)
UROBILINOGEN, URINE: 0.2 E.U./DL
VLDLC SERPL CALC-MCNC: 13 MG/DL
WBC # BLD: 14.7 E9/L (ref 4.5–11.5)
WBC UA: ABNORMAL /HPF (ref 0–5)

## 2022-04-27 PROCEDURE — 83605 ASSAY OF LACTIC ACID: CPT

## 2022-04-27 PROCEDURE — 6360000002 HC RX W HCPCS: Performed by: INTERNAL MEDICINE

## 2022-04-27 PROCEDURE — 84132 ASSAY OF SERUM POTASSIUM: CPT

## 2022-04-27 PROCEDURE — 85025 COMPLETE CBC W/AUTO DIFF WBC: CPT

## 2022-04-27 PROCEDURE — 82962 GLUCOSE BLOOD TEST: CPT

## 2022-04-27 PROCEDURE — 94640 AIRWAY INHALATION TREATMENT: CPT

## 2022-04-27 PROCEDURE — 6370000000 HC RX 637 (ALT 250 FOR IP): Performed by: INTERNAL MEDICINE

## 2022-04-27 PROCEDURE — 87081 CULTURE SCREEN ONLY: CPT

## 2022-04-27 PROCEDURE — 80053 COMPREHEN METABOLIC PANEL: CPT

## 2022-04-27 PROCEDURE — 2580000003 HC RX 258: Performed by: INTERNAL MEDICINE

## 2022-04-27 PROCEDURE — 80048 BASIC METABOLIC PNL TOTAL CA: CPT

## 2022-04-27 PROCEDURE — APPSS60 APP SPLIT SHARED TIME 46-60 MINUTES: Performed by: PHYSICIAN ASSISTANT

## 2022-04-27 PROCEDURE — 94660 CPAP INITIATION&MGMT: CPT

## 2022-04-27 PROCEDURE — 87206 SMEAR FLUORESCENT/ACID STAI: CPT

## 2022-04-27 PROCEDURE — 81001 URINALYSIS AUTO W/SCOPE: CPT

## 2022-04-27 PROCEDURE — 82805 BLOOD GASES W/O2 SATURATION: CPT

## 2022-04-27 PROCEDURE — 71045 X-RAY EXAM CHEST 1 VIEW: CPT

## 2022-04-27 PROCEDURE — 83735 ASSAY OF MAGNESIUM: CPT

## 2022-04-27 PROCEDURE — 82140 ASSAY OF AMMONIA: CPT

## 2022-04-27 PROCEDURE — 71250 CT THORAX DX C-: CPT

## 2022-04-27 PROCEDURE — 6370000000 HC RX 637 (ALT 250 FOR IP): Performed by: FAMILY MEDICINE

## 2022-04-27 PROCEDURE — 36415 COLL VENOUS BLD VENIPUNCTURE: CPT

## 2022-04-27 PROCEDURE — 99222 1ST HOSP IP/OBS MODERATE 55: CPT | Performed by: INTERNAL MEDICINE

## 2022-04-27 PROCEDURE — 2580000003 HC RX 258

## 2022-04-27 PROCEDURE — 94669 MECHANICAL CHEST WALL OSCILL: CPT

## 2022-04-27 PROCEDURE — 2140000000 HC CCU INTERMEDIATE R&B

## 2022-04-27 PROCEDURE — 80061 LIPID PANEL: CPT

## 2022-04-27 PROCEDURE — 6370000000 HC RX 637 (ALT 250 FOR IP)

## 2022-04-27 PROCEDURE — 6360000002 HC RX W HCPCS

## 2022-04-27 PROCEDURE — 99221 1ST HOSP IP/OBS SF/LOW 40: CPT | Performed by: INTERNAL MEDICINE

## 2022-04-27 PROCEDURE — 87070 CULTURE OTHR SPECIMN AEROBIC: CPT

## 2022-04-27 RX ORDER — IPRATROPIUM BROMIDE AND ALBUTEROL SULFATE 2.5; .5 MG/3ML; MG/3ML
1 SOLUTION RESPIRATORY (INHALATION)
Status: DISCONTINUED | OUTPATIENT
Start: 2022-04-27 | End: 2022-05-05 | Stop reason: HOSPADM

## 2022-04-27 RX ORDER — PANTOPRAZOLE SODIUM 40 MG/1
40 TABLET, DELAYED RELEASE ORAL
Status: DISCONTINUED | OUTPATIENT
Start: 2022-04-27 | End: 2022-05-05 | Stop reason: HOSPADM

## 2022-04-27 RX ORDER — GUAIFENESIN/DEXTROMETHORPHAN 100-10MG/5
5 SYRUP ORAL EVERY 4 HOURS PRN
Status: DISCONTINUED | OUTPATIENT
Start: 2022-04-27 | End: 2022-05-05 | Stop reason: HOSPADM

## 2022-04-27 RX ORDER — LEVOFLOXACIN 5 MG/ML
250 INJECTION, SOLUTION INTRAVENOUS EVERY 24 HOURS
Status: DISCONTINUED | OUTPATIENT
Start: 2022-04-28 | End: 2022-04-27

## 2022-04-27 RX ORDER — LOSARTAN POTASSIUM 25 MG/1
25 TABLET ORAL DAILY
Status: DISCONTINUED | OUTPATIENT
Start: 2022-04-27 | End: 2022-05-05 | Stop reason: HOSPADM

## 2022-04-27 RX ORDER — BUMETANIDE 1 MG/1
2 TABLET ORAL DAILY
Status: DISCONTINUED | OUTPATIENT
Start: 2022-04-27 | End: 2022-04-27

## 2022-04-27 RX ORDER — LEVOFLOXACIN 5 MG/ML
500 INJECTION, SOLUTION INTRAVENOUS ONCE
Status: COMPLETED | OUTPATIENT
Start: 2022-04-27 | End: 2022-04-27

## 2022-04-27 RX ORDER — METOPROLOL SUCCINATE 25 MG/1
25 TABLET, EXTENDED RELEASE ORAL DAILY
Status: DISCONTINUED | OUTPATIENT
Start: 2022-04-27 | End: 2022-05-05 | Stop reason: HOSPADM

## 2022-04-27 RX ORDER — BUMETANIDE 1 MG/1
2 TABLET ORAL DAILY
Status: ON HOLD | COMMUNITY
End: 2022-05-05 | Stop reason: HOSPADM

## 2022-04-27 RX ORDER — HYDRALAZINE HYDROCHLORIDE 20 MG/ML
10 INJECTION INTRAMUSCULAR; INTRAVENOUS EVERY 6 HOURS PRN
Status: DISCONTINUED | OUTPATIENT
Start: 2022-04-27 | End: 2022-05-05 | Stop reason: HOSPADM

## 2022-04-27 RX ORDER — BENZONATATE 100 MG/1
100 CAPSULE ORAL 3 TIMES DAILY PRN
Status: DISCONTINUED | OUTPATIENT
Start: 2022-04-27 | End: 2022-05-05 | Stop reason: HOSPADM

## 2022-04-27 RX ORDER — LOSARTAN POTASSIUM 25 MG/1
25 TABLET ORAL DAILY
COMMUNITY

## 2022-04-27 RX ORDER — LEVOFLOXACIN 5 MG/ML
750 INJECTION, SOLUTION INTRAVENOUS
Status: DISCONTINUED | OUTPATIENT
Start: 2022-04-28 | End: 2022-04-28

## 2022-04-27 RX ORDER — METOPROLOL SUCCINATE 25 MG/1
25 TABLET, EXTENDED RELEASE ORAL DAILY
COMMUNITY

## 2022-04-27 RX ORDER — LEVOFLOXACIN 5 MG/ML
500 INJECTION, SOLUTION INTRAVENOUS EVERY 24 HOURS
Status: DISCONTINUED | OUTPATIENT
Start: 2022-04-27 | End: 2022-04-27 | Stop reason: DRUGHIGH

## 2022-04-27 RX ORDER — POTASSIUM CHLORIDE 1.5 G/1.77G
20 POWDER, FOR SOLUTION ORAL DAILY
Status: ON HOLD | COMMUNITY
End: 2022-05-05

## 2022-04-27 RX ORDER — OMEPRAZOLE 20 MG/1
40 CAPSULE, DELAYED RELEASE ORAL DAILY
Status: ON HOLD | COMMUNITY
End: 2022-05-05

## 2022-04-27 RX ADMIN — BENZONATATE 100 MG: 100 CAPSULE ORAL at 04:56

## 2022-04-27 RX ADMIN — CHOLECALCIFEROL TAB 10 MCG (400 UNIT) 400 UNITS: 10 TAB at 17:45

## 2022-04-27 RX ADMIN — PANTOPRAZOLE SODIUM 40 MG: 40 TABLET, DELAYED RELEASE ORAL at 06:10

## 2022-04-27 RX ADMIN — HEPARIN SODIUM 5000 UNITS: 10000 INJECTION INTRAVENOUS; SUBCUTANEOUS at 15:47

## 2022-04-27 RX ADMIN — IPRATROPIUM BROMIDE AND ALBUTEROL SULFATE 1 AMPULE: .5; 2.5 SOLUTION RESPIRATORY (INHALATION) at 15:51

## 2022-04-27 RX ADMIN — Medication 10 ML: at 10:52

## 2022-04-27 RX ADMIN — VANCOMYCIN HYDROCHLORIDE 1250 MG: 10 INJECTION, POWDER, LYOPHILIZED, FOR SOLUTION INTRAVENOUS at 22:54

## 2022-04-27 RX ADMIN — ROPINIROLE HYDROCHLORIDE 4 MG: 2 TABLET, FILM COATED ORAL at 21:46

## 2022-04-27 RX ADMIN — HEPARIN SODIUM 5000 UNITS: 10000 INJECTION INTRAVENOUS; SUBCUTANEOUS at 21:47

## 2022-04-27 RX ADMIN — Medication 10 ML: at 21:46

## 2022-04-27 RX ADMIN — LEVOFLOXACIN 500 MG: 5 INJECTION, SOLUTION INTRAVENOUS at 10:49

## 2022-04-27 RX ADMIN — FUROSEMIDE 20 MG: 10 INJECTION, SOLUTION INTRAMUSCULAR; INTRAVENOUS at 17:54

## 2022-04-27 RX ADMIN — CALCIUM POLYCARBOPHIL 625 MG: 625 TABLET, FILM COATED ORAL at 17:45

## 2022-04-27 RX ADMIN — HEPARIN SODIUM 5000 UNITS: 10000 INJECTION INTRAVENOUS; SUBCUTANEOUS at 06:10

## 2022-04-27 RX ADMIN — IPRATROPIUM BROMIDE AND ALBUTEROL SULFATE 1 AMPULE: .5; 2.5 SOLUTION RESPIRATORY (INHALATION) at 19:00

## 2022-04-27 RX ADMIN — FUROSEMIDE 20 MG: 10 INJECTION, SOLUTION INTRAMUSCULAR; INTRAVENOUS at 10:47

## 2022-04-27 ASSESSMENT — PAIN SCALES - GENERAL
PAINLEVEL_OUTOF10: 0

## 2022-04-27 NOTE — PLAN OF CARE
Problem: Safety - Adult  Goal: Free from fall injury  Outcome: Progressing     Problem: ABCDS Injury Assessment  Goal: Absence of physical injury  4/26/2022 2152 by Meghan Gibbs RN  Outcome: Progressing  4/26/2022 2152 by Meghan Gibbs, RN  Outcome: Progressing

## 2022-04-27 NOTE — DISCHARGE INSTR - DIET
Good nutrition is important when healing from an illness, injury, or surgery. Follow any nutrition recommendations given to you during your hospital stay. If you were given an oral nutrition supplement while in the hospital, continue to take this supplement at home. You can take it with meals, in-between meals, and/or before bedtime. These supplements can be purchased at most local grocery stores, pharmacies, and chain super-stores. If you have any questions about your diet or nutrition, call the hospital and ask for the dietitian. A heart-healthy diet is recommended to reduce your unhealthy blood cholesterol levels, manage high blood pressure, and lower your risk for heart disease. To follow a heart-healthy diet,    Eat a balanced diet with whole grains, fruits and vegetables, and lean protein sources. Achieve and maintain a healthy weight. Choose heart-healthy unsaturated fats. Limit saturated fats, trans fats, and cholesterol intake. Eat more plant-based or vegetarian meals using beans and soy foods for protein. Eat whole, unprocessed foods to limit the amount of sodium (salt) you eat. Limit refined carbohydrates especially sugar, sweets and sugar-sweetened beverages. If you drink alcohol, do so in moderation: one serving per day (women) and two servings per day (men). One serving is equivalent to 12 ounces beer, 5 ounces wine, or 1.5 ounces distilled spirits         Tips  Tips for Choosing Heart-Healthy Fats  Choose lean protein and low-fat dairy foods to reduce saturated fat intake. Saturated fat is usually found in animal-based protein and is associated with certain health risks. Saturated fat is the biggest contributor to raised low-density lipoprotein (LDL) cholesterol levels in the diet. Research shows that limiting saturated fat lowers unhealthy cholesterol levels. Eat no more than 7% of your total calories each day from saturated fat.  Ask your RDN to help you determine how much saturated fat is right for you. There are many foods that do not contain large amounts of saturated fats. Swapping these foods to replace foods high in saturated fats will help you limit the saturated fat you eat and improve your cholesterol levels. You can also try eating more plant-based or vegetarian meals. Instead of    Try:    Whole milk, cheese, yogurt, and ice cream    1%, ½%, or skim milk, low-fat cheese, non-fat yogurt, and low-fat ice cream    Fatty, marbled beef and pork    Lean beef, pork, or venison    Poultry with skin    Poultry without skin    Butter, stick margarine    Reduced-fat, whipped, or liquid spreads    Coconut oil, palm oil    Liquid vegetable oils: corn, canola, olive, soybean and safflower oils         Avoid trans fats. Trans fats increase levels of LDL-cholesterol. Hydrogenated fat in processed foods is the main source of trans fats in foods. Trans fats can be found in stick margarine, shortening, processed sweets, baked goods, some fried foods, and packaged foods made with hydrogenated oils. Avoid foods with partially hydrogenated oil on the ingredient list such as: cookies, pastries, baked goods, biscuits, crackers, microwave popcorn, and frozen dinners. Choose foods with heart healthy fats. Polyunsaturated and monounsaturated fat are unsaturated fats that may help lower your blood cholesterol level when used in place of saturated fat in your diet. Ask your RDN about taking a dietary supplement with plant sterols and stanols to help lower your cholesterol level. Research shows that substituting saturated fats with unsaturated fats is beneficial to cholesterol levels. Try these easy swaps:        Instead of    Try:    Butter, stick margarine, or solid shortening    Reduced-fat, whipped, or liquid spreads    Beef, pork, or poultry with skin         Fish and seafood    Chips, crackers, snack foods    Raw or unsalted nuts and seeds or nut butters    Hummus with vegetables    Avocado on toast    Coconut oil, palm oil    Liquid vegetable oils: corn, canola, olive, soybean and safflower oils         Limit the amount of cholesterol you eat to less than 200 milligrams per day. Cholesterol is a substance carried through the bloodstream via lipoproteins, which are known as transporters of fat. Some body functions need cholesterol to work properly, but too much cholesterol in the bloodstream can damage arteries and build up blood vessel linings (which can lead to heart attack and stroke). You should eat less than 200 milligrams cholesterol per day. People respond differently to eating cholesterol. There is no test available right now that can figure out which people will respond more to dietary cholesterol and which will respond less. For individuals with high intake of dietary cholesterol, different types of increase (none, small, moderate, large) in LDL-cholesterol levels are all possible. Food sources of cholesterol include egg yolks and organ meats such as liver, gizzards. Limit egg yolks to two to four per week and avoid organ meats like liver and gizzards to control cholesterol intake. Tips for Choosing Heart-Healthy Carbohydrates  Consume foods rich in viscous (soluble) fiber    Viscous, or soluble, fiber is found in the walls of plant cells. Viscous fiber is found only in plant-based foods--animal-based foods like meat or dairy products do not contain fiber. In the stomach, viscous fibers absorb water and swell to form a thick, jelly-like mass. This helps to lower your unhealthy cholesterol  Rich sources of viscous fiber include asparagus, [place] sprouts, sweet potatoes, turnips, apricots, mangoes, oranges, legumes, barley, oats, and oat bran. Eat at least 5 to 10 grams of viscous fiber each day. As you increase your fiber intake gradually, also increase the amount of water you drink. This will help prevent constipation.   If you have difficulty achieving this goal, ask your RDN about fiber laxatives. Choose fiber supplements made with viscous fibers such as psyllium seed husks or methylcellulose to help lower unhealthy cholesterol. Limit refined carbohydrates    There are three types of carbohydrates: starches, sugar, and fiber. Some carbohydrates occur naturally in food, like the starches in rice or corn or the sugars in fruits and milk. Refined carbohydrates--foods with high amounts of simple sugars--can raise triglyceride levels. High triglyceride levels are associated with coronary heart disease. Some examples of refined carbohydrate foods are table sugar, sweets, and beverages sweetened with added sugar. Tips for Reducing Sodium (Salt)  Although sodium is important for your body to function, too much sodium can be harmful for people with high blood pressure. As sodium and fluid buildup in your tissues and bloodstream, your blood pressure increases. High blood pressure may cause damage to other organs and increase your risk for a stroke. Even if you take a pill for blood pressure or a water pill (diuretic) to remove fluid, it is still important to have less salt in your diet. Ask your doctor and RDN what amount of sodium is right for you. Avoid processed foods. Eat more fresh foods. Fresh fruits and vegetables are naturally low in sodium, as well as frozen vegetables and fruits that have no added juices or sauces. Fresh meats are lower in sodium than processed meats, such as ramos, sausage, and hotdogs. Read the nutrition label or ask your  to help you find a fresh meat that is low in sodium. Eat less salt--at the table and when cooking. A single teaspoon of table salt has 2,300 mg of sodium. Leave the salt out of recipes for pasta, casseroles, and soups. Ask your RDN how to cook your favorite recipes without sodium  Be a smart . Look for food packages that say salt-free or sodium-free.  These items contain less than 5 milligrams of sodium per serving. Very low-sodium products contain less than 35 milligrams of sodium per serving. Low-sodium products contain less than 140 milligrams of sodium per serving. Beware of reduced salt or reduced sodium products. These items may still be high in sodium. Check the nutrition label. Add flavors to your food without adding sodium. Try lemon juice, lime juice, fruit juice or vinegar. Dry or fresh herbs add flavor. Try basil, bay leaf, dill, rosemary, parsley, jay jay, dry mustard, nutmeg, thyme, and paprika. Pepper, red pepper flakes, and cayenne pepper can add spice to your meals without adding sodium. Hot sauce contains sodium, but if you use just a drop or two, it will not add up to much. Buy a sodium-free seasoning blend or make your own at home. Additional Lifestyle Tips  Achieve and maintain a healthy weight. Talk with your RDN or your doctor about what is a healthy weight for you. Set goals to reach and maintain that weight. To lose weight, reduce your calorie intake along with increasing your physical activity. A weight loss of 10 to 15 pounds could reduce LDL-cholesterol by 5 milligrams per deciliter. Participate in physical activity. Talk with your health care team to find out what types of physical activity are best for you. Set a plan to get about 30 minutes of exercise on most days.   Foods Recommended  Food Group    Foods Recommended    Grains    Grain foods including whole grains: whole wheat, barley, rye, buckwheat, corn, teff, quinoa, millet, amaranth, brown or wild rice, sorghum, and oats    Processed whole grains such as pasta, rice, hot and cold cereals, and snacks that contain less than 300 mg sodium per serving    Whole grain bread, crackers, rolls, or tara with <99 mg sodium per slice (Note: yeast breads usually have less sodium than those made with baking soda),    Home-made bread made with reduced-sodium baking soda    Protein Foods    Fresh red meat: lean, trimmed cuts of beef, pork, or lamb    Fresh poultry: skinless chicken or turkey    Fresh seafood: fish (particularly fatty fish: salmon, herring), shrimp, lobster, clams, and scallops    Eggs (2-4 per week), eggwhites or egg substitute    Nuts and seeds (unsalted): peanuts, almonds, pistachios, and sunflower seeds, unsalted; peanut butter, almond butter, and sunflower seed butter, reduced sodium. Soy foods: tofu, tempeh, or soynuts    Meat alternatives: veggie burgers and sausages from plant protein without added sodium    Legumes: such as dried beans, lentils, or peas at least a few times per week in place of other protein sources, unsalted    Dairy    Skim, ½% or 1% milk, low-fat yogurt, low-sodium cheeses (Swiss cheese, ricotta cheese, and fresh mozzarella, low sodium cottage cheese)    Fortified soymilk, almond milk, rice milk, hemp milk    Frozen desserts (½ cup) made from low-fat milk    Vegetables    Fresh, frozen, and canned (unsalted) whole vegetables, including dark-green, red and orange vegetables, legumes (beans and peas), and starchy vegetables without added sauces, salt, or sodium; low-sodium vegetable juices    Fruits    Fresh, frozen, canned and dried whole unsweetened fruits canned fruit packed in water or fruit juice without added sugar; 100% fruit juice    Oils    Unsaturated vegetable oils: [place], avocado, canola, cashew, corn, grapeseed, olive, safflower, sesame, soybean, sunflower    Margarines and spreads which list liquid vegetable oil as the first ingredient and does not contain trans fats (partially hydrogenated oil)    Salad dressings made from oil and low in sodium (salt)    Avocado    Other    Prepared foods, including soups, casseroles, and salads made from recommended ingredients and contain <600 mg sodium. Homemade soups, casseroles, entrees, and side dishes typically contain less sodium that prepared alternatives.     Homemade soups and sauces such as gravy    Low-sodium crackers, chips, pretzels    Low-sodium seasonings (ketchup, BBQ)    Spices, herbs, Salt-free seasoning mixes and marinades    Vinegar    Lemon or lime juice    Foods Not Recommended  Food Group    Foods Not Recommended    Grains    Breakfast cereals, packaged baked goods, snack crackers, and prepared grains with more than 300 mg sodium per serving    Biscuits, cornbread, and other quick breads prepared with baking soda    Breads or crackers topped with salt    Bakery products, such as doughnuts, biscuits, croissants, [place] pastries, pies, cookies    Instant potatoes, noodles, rice, stuffing mix, or macaroni and cheese    Snacks made with partially hydrogenated oils, including chips, cheese puffs, snack mixes, regular crackers, butter-flavored popcorn    Prepackaged bread crumbs    Self-rising flours    Protein Foods    Meats high in saturated fat such as ribs, t-bone steak, regular 70/30 hamburger    Processed red meats, such as ramos, sausage, ham, pepperoni, hot dogs, corned beef, cured or smoked meats, canned meat, chili, karli sausage, sardines, and spam with added sodium    Deli meats, such as pastrami, bologna, or salami (made of meat or poultry) with added sodium    Organ meat such as liver, gizzards, or sweetbread    Preseasoned and precooked meats    Poultry with skin or  processed poultry (chicken and turkey) with skin, breading, or high-sodium marinades or sauces    Whole eggs or egg yolks (greater than 5 per week)    [facility], poultry, or fish    Smoked fish and meats    Salted legumes, nuts, seeds, or nut/seed butters    Meat alternatives with high levels of sodium (>300 mg per serving) or saturated fat (>5 g per serving)    Dairy    Whole milk,?2% fat milk, or Buttermilk    Cream, half-&-half    Cream cheese, sour cream    Regular and processed cheese or sauces    Regular-sodium cottage cheese    Vegetables    Canned or frozen vegetables with salt, fresh vegetables prepared with salt, butter, cheese, or cream sauce    Pickled vegetables such as olives, pickles, or sauerkraut    Tomato or pasta sauce with high levels of salt (>300 mg per serving)    [place] vegetables    Fruits    None    Oils    Solid shortening or partially hydrogenated oils    Solid margarine made with hydrogenated or partially hydrogenated oils or trans fat    Salted margarine that contains trans fats    Butter (salted or unsalted)    Salad dressings with trans fat or high levels of sodium ([place], blue cheese, [place], [place])    Tropical oils (coconut, palm, palm kernel oils)    Other    Sugary and/or fatty desserts, candy, and other sweets    Canned soups that are >600 mg of sodium    Frozen meals and prepared sides that are >600 mg of sodium    Store-bought egg beaters (with added sodium)    Salts:  sea salt, kosher salt, onion salt, and garlic salt, seasoning mixes containing salt    Flavorings: bouillon cubes, catsup or ketchup, barbeque sauce, Worcestershire sauce, soy sauce, salsa, relish, teriyaki sauce    Heart-Healthy - Reduced Sodium Vegan 1-Day Sample Menu   Breakfast  1 slice whole wheat toast  2 tablespoons peanut butter without salt  Tofu scramble made with: ½ cup calcium-set tofu  ½ cup green pepper  ½ cup spinach  ½ cup tomatoes  ½ cup white mushrooms  1 teaspoon canola oil  1 cup soymilk fortified with calcium, vitamin B12, and vitamin D  Lunch  1 cup reduced sodium split pea soup  1 whole wheat dinner roll  1 medium apple  Dinner  Salad made with: 1 cup lentils  ½ cup cooked broccoli  ½ cup cooked carrots  2 tablespoons hummus  1 cup sliced strawberries  1 cup soymilk fortified with calcium, vitamin B12, and vitamin D  Evening Snack  1 cup soy yogurt  ¼ cup mixed nuts  Daily Sum  Nutrient Unit Value  Macronutrients  Energy kcal 1672  Energy kJ 7000  Protein g 86  Total lipid (fat) g 65  Carbohydrate, by difference g 205  Fiber, total dietary g 47  Sugars, total g 73  Minerals  Calcium, Ca mg 1443  Iron, Fe mg 19  Sodium, Na mg 1148  Vitamins  Vitamin C, total ascorbic acid mg 253  Vitamin A, IU IU 66118  Vitamin D   Lipids  Fatty acids, total saturated g 11  Fatty acids, total monounsaturated g 29  Fatty acids, total polyunsaturated g 19  Cholesterol mg 5  Heart-Healthy - Reduced Sodium Vegetarian (Lacto-Ovo) Sample 1-Day Menu   Breakfast  1 cup cooked oatmeal  1 tablespoon ground flaxseed  1 cup blueberries  2 scrambled egg whites with 1 teaspoon canola oil  2 tablespoons salsa  1 cup fat-free milk  1 cup coffee  Oil, canola  Lunch  2 slices whole wheat bread  2 tablespoons peanut butter without salt  1 small banana  6 ounces fat-free plain yogurt  1 cup sliced red pepper  2 tablespoons hummus  1 cup fat-free milk  Evening Meal  Stir daugherty made with: ½ cup tofu  1 cup brown rice  ½ cup cooked broccoli  ½ cup cooked carrots  ½ cup cooked green beans  1 teaspoon peanut oil  Evening Snack  1 slice low-fat mozzarella cheese  1 medium apple  Daily Sum  Nutrient Unit Value  Macronutrients  Energy kcal 1764  Energy kJ 7375  Protein g 87  Total lipid (fat) g 53  Carbohydrate, by difference g 254  Fiber, total dietary g 35  Sugars, total g 98  Minerals  Calcium, Ca mg 1609  Iron, Fe mg 12  Sodium, Na mg 1434  Vitamins  Vitamin C, total ascorbic acid mg 210  Vitamin A, IU IU 55185  Vitamin D   Lipids  Fatty acids, total saturated g 12  Fatty acids, total monounsaturated g 21  Fatty acids, total polyunsaturated g 15  Cholesterol mg 31  Heart-Healthy Eating Sample 1-Day Menu   Breakfast  1 cup oatmeal  1 cup fat-free milk  1 cup blueberries  1 cup brewed coffee  1 ounce almonds  Lunch  2 slices whole-wheat bread  2 oz lean deli turkey breast  1 oz low-fat Swiss cheese  2 slices tomato  2 lettuce leaves  1 pear  1 cup skim milk  Afternoon Snack  1 oz trail mix (with nuts, seeds, raisins)  Evening Meal  3 oz broiled salmon  2/3 cup brown rice  1 tsp margarine  1/2 cup cooked broccoli  1/2 cup cooked carrots  1 cup tossed salad  1 teaspoon olive oil and vinegar dressing  1 small whole-wheat roll  1 tsp margarine  1 cup tea  Evening Snack  1 banana  Daily Sum  Nutrient Unit Value  Macronutrients  Energy kcal 2069  Energy kJ 8655  Protein g 146  Total lipid (fat) g 69  Carbohydrate, by difference g 228  Fiber, total dietary g 33  Sugars, total g 85  Minerals  Calcium, Ca mg 1292  Iron, Fe mg 14  Sodium, Na mg 1751  Vitamins  Vitamin C, total ascorbic acid mg 85  Vitamin A, IU IU 26295  Vitamin D   Lipids  Fatty acids, total saturated g 12  Fatty acids, total monounsaturated g 27  Fatty acids, total polyunsaturated g 24  Cholesterol mg 270

## 2022-04-27 NOTE — PROGRESS NOTES
Messaged Dr Alma Cassidy regarding updated home med list, changing pt's code status to [address] per pt.     Hiro Vaughn RN

## 2022-04-27 NOTE — PROGRESS NOTES
Dr. Gautam Barba, DO,    Your patient is on a medication that requires a renal and/or weight dose adjustment. Renal/Body Weight Function Assessment:    Date Body Weight IBW  Adjusted BW SCr  CrCl Dialysis status   4/27/2022 145 lb 4.5 oz (65.9 kg) Ideal body weight: 48.8 kg (107 lb 8.4 oz)  Adjusted ideal body weight: 55.6 kg (122 lb 10 oz) Serum creatinine: 1.5 mg/dL (H) 04/27/22 9861  Estimated creatinine clearance: 22 mL/min (A) N/a       Pharmacy has dose-adjusted the following medication(s):    Date Previous Order Adjusted Order   4/27/2022 Levaquin 500 mg Daily x 7 days Levaquin 500 mg once, then Levaquin 250 mg Daily for 6 doses       These changes were made per protocol according to the [facility] Renal Dosing Policy/ [facility] Pharmacist Anticoagulant Review. *Please note this dose may need readjusted if your patient's condition changes. Please contact pharmacy with any questions regarding these changes.     Rojelio Roper, PharmD 4/27/2022 10:09 AM

## 2022-04-27 NOTE — CARE COORDINATION
4/27 Care Coordination. Patient was admit from ED on 4/26 d/t SOB and BLE edema. Patient was sent in by PCP for SOB, she was 85% on RA. Cardio and pulm consulted. ECHO pending. Receiving Levaquin IV Q24H and Lasix 20 MG IV BID. Patient was RRT early this AM. Left message with patient's daughter Den Campbell regarding transition of care planning. Will await callback. [address] with daughter Den Campbell and her  Sanna Gomez at bedside regarding transition of care planning. Patient's PCP is Dr. Yessica Ham and she uses [facility] in [place]. Patient lives with Den Campbell in a 2 story home with 2 SUSAN. Patient has a FWW, cane, BSC, and walk-in shower with SC. Patient has history of RONALD with [facility] in [place]. Daughter requested [facility] RONALD list to review in case patient would require rehab on discharge. Will need PT/OT evals when patient stable. SW/CM will continue to follow. IVONNE Mills, RN  [facility] Case Management   Cell: [phone]     The Plan for Transition of Care is related to the following treatment goals: RONALD    The Patient and/or patient representative Den Campbell (daughter) was provided with a choice of provider and agrees with the discharge plan. [x] Yes [] No    Freedom of choice list was provided with basic dialogue that supports the patient's individualized plan of care/goals, treatment preferences and shares the quality data associated with the providers.  [x] Yes [] No

## 2022-04-27 NOTE — ACP (ADVANCE CARE PLANNING)
Advance Care Planning     Advance Care Planning Activator (Inpatient)  Conversation Note      Date of ACP Conversation: 4/27/2022   Conversation Conducted with: Patient with Decision Making Capacity  ACP Activator: Eduardo Marley diagnoses warranting ACP:  Principal Problem:    Heart failure [facility])  Resolved Problems:    * No resolved hospital problems. *        Health Care Decision Maker:   Current Designated Health Care Decision Maker:         Care Preferences    Ventilation: \"If you were in your present state of health and suddenly became very ill and were unable to breathe on your own, what would your preference be about the use of a ventilator (breathing machine) if it were available to you? \"      Would the patient desire the use of ventilator (breathing machine)?: no    \"If your health worsens and it becomes clear that your chance of recovery is unlikely, what would your preference be about the use of a ventilator (breathing machine) if it were available to you? \"     Would the patient desire the use of ventilator (breathing machine)?: No      Resuscitation  \"CPR works best to restart the heart when there is a sudden event, like a heart attack, in someone who is otherwise healthy. Unfortunately, CPR does not typically restart the heart for people who have serious health conditions or who are very sick. \"    \"In the event your heart stopped as a result of an underlying serious health condition, would you want attempts to be made to restart your heart (answer \"yes\" for attempt to resuscitate) or would you prefer a natural death (answer \"no\" for do not attempt to resuscitate)? \" no       [x] Yes   [] No   Educated Patient / Terence Luque regarding differences between Advance Directives and portable DNR orders.         Conversation Outcomes:  [x] ACP discussion completed  [] Existing advance directive reviewed with patient; no changes to patient's previously recorded wishes  [] New Advance Directive completed  [] Portable Do Not Rescitate prepared for Provider review and signature  [] POLST/POST/MOLST/MOST prepared for Provider review and signature    Details of ACP discussion:     Length of ACP Conversation in minutes: 15   Code status following completion of discussion: Limited     Follow-up plan:    [] Schedule follow-up conversation to continue planning  [] Referred individual to Provider for additional questions/concerns   [] Advised patient/agent/surrogate to review completed ACP document and update if needed with changes in condition, patient preferences or care setting  [] This note routed to one or more involved healthcare providers  [x] None    Electronically signed by MICKI Baird CNP on 4/27/2022 at 2:01 PM

## 2022-04-27 NOTE — PLAN OF CARE
Attempted to preform CHF nurse consult, patient wearing BiPAP, still using accessory muscles to breath, bedside nurse present. Patient's daughter Oseas Terrell also at the bedside. CHF nurse will continue to follow.

## 2022-04-27 NOTE — PLAN OF CARE
Patient's chart updated to reflect:      . - HF care plan, HF education points and HF discharge instructions.  -Orders: 2 gram sodium diet, daily weights, I/O.  -PCP and cardiology follow up appointments to be scheduled within 7 days of hospital discharge.   -CHF education session will be provided to the patient prior to hospital discharge.  -Awaiting Cardiology consult  Ruth Antonio RN RN, BSN  Heart Failure Navigator

## 2022-04-27 NOTE — CODE DOCUMENTATION
Critical Care - Rapid Response Nursing Note      Date of event: 4/27/2022   Time of event: James Lopez 80y.o. year old female   YOB: 1931     Attending MD: Kristina Dick DO     Location: [id number]-[id number]     ______________________________________________________________________  Reason for RRT:    Hypoxia and Altered Mental Status    Admitting Diagnosis: Heart failure ([facility] [I50.9]  Acute respiratory failure with hypoxia ([facility] [J96.01]  Chronic congestive heart failure, unspecified heart failure type ([facility] [I50.9]      Code status: [] Full  [x] DNR-CCA  []DNR-CC []Limited    Situation/Assessment: Patient found to have mental status altered from baseline. ABGs were ordered and patient was placed on BiPAP afterwards, and o2 bentley to 97% shortly thereafter. UA was ordered. Follow up ABGs to be preformed in 2 hrs.          Interventions: [x] CXR    [] Suctioned        [x] ABG    [x] Bipap       [] EKG    [] Oral/Nasal Airway     [x] Oxygen:    [] Nebulizer Treatment      []Nasal Cannula      [x]Non Rebreather      []Bag Mask    Medications: none given    Labs Sent: CBC, CMP, ammonia, Lactic acid            Disposition:  [x] No transfer   [] Transfer to monitor floor  [] Transfer to: [] MICU [] NICU [] CVIC [] SICU    Notifications:  Notified Family of patient transfer:  [] Yes   [] No    [x] N/A                 ?

## 2022-04-27 NOTE — CONSULTS
Inpatient [id number] [place] [address] Cardiology Consultation      Reason for Consult: CHF    Consulting Physician: Dr Cherelle العليer    Requesting Physician:  Lex Phipps, MICKI - CNP    Date of Consultation: 4/27/2022    HISTORY OF PRESENT ILLNESS:   Velasquez Faith is a 27-year-old  female who is not known to [address] [place] [address] cardiology, but has a distant history with Dr. Sundeep Silvestre in [facility]. PMHx: Aortic stenosis s/p TAVR ( 2021), hypertension, hyperlipidemia, hypothyroidism. HPI:   · Patient presented to [facility] emergency department 4/26/2022 for shortness of breath, orthopnea, and lower leg edema for several days. The patient was found to be 85% on room air at arrival.  The patient was given 80 mg IV Lasix in ER. VS in ER were 79 heart rate, 145/58, 85% on room air. · Per notes from 4/27/2022 patient had RRT event for AMS this morning. The patient was found to be hypercapnic per ABG and was started on BiPAP. The patient was not transferred to ICU at this time. Patient is DNR CCA. · Upon examination 4/27/2022 patient is currently on BiPAP and unable to answer questions due to acute AMS. ROS unable to be obtained at this time. Physical exam noted below. Per nursing patient's family is on their way in and has been spoken with about acute events. Primary care team has Shereen Proctor in setting of right upper lobe pneumonia. Patient has stable VS on BiPAP. Per nursing patient was A&O x4 since being admitted until acute change this morning with patient trying to get out of bed and taking off equipment and oxygen. Per family patient is always A&O x4 with no history of diagnosed dementia/Alzheimer's. Please note: past medical records were reviewed per electronic medical record (EMR) - see detailed reports under Past Medical/ Surgical History. PAST MEDICAL HISTORY:      1. HFpEF:  · Echocardiogram (2/15/2014): All cardiac chamber sizes including aortic root size are within normallimits. The left ventricle shows normal wall thicknesses. Diastolic filling dysfunction stage 1 is seen. Systolic function is well preserved with ejection fraction estimated at 66%. No focal wall motion abnormality is seen. The mitral valve shows thickening of the leaflets. No significant mitral stenosis. Mitral valve area 3.3 cm2 by pressure half-time with a maximal gradient of 4.6 mmHg. There is no mitral stenosis. There is 1+ to at most 2+ mitral regurgitation. Aortic valve appears structurally normal, is sclerotic. Maximal gradient 17 mmHg. Mean gradient 9.9. There is mild aortic stenosis with trace aortic insufficiency present. Aortic valve area estimated at 1.6 cm2. There is no pulmonic stenosis or insufficiency. There is 2+ tricuspid regurgitation with pulmonary hypertension at 40 mmHg. There is no significant pericardial effusion or any definite intracavitary mass, or thrombus, or shunt identified on this study. · Echocardiogram (9/12/2020):  Summary: Ejection fraction is visually estimated at 61%. Overall ejection fraction is normal. There is doppler evidence of stage I diastolic dysfunction. Physiologic and/or trace mitral regurgitation is present. Mild mitral annular calcification. The aortic valve appears moderately sclerotic. Mild aortic regurgitation is noted. Moderate aortic stenosis. Mild tricuspid regurgitation. · Lexiscan stress test (2/17/2014): LVEF equals 73%. Gated wall motion examination was performed in conjunction with cardiac SPECT imaging. Left ventricular wall motion is fairly uniform with no demonstration of focal or global hypokinesia. There is no demonstration of left ventricular dilatation. Impression: No fixed or reversible perfusion abnormality involving the left ventricle induced by Lexiscan stress. 2. Aortic stenosis with TAVR ( 2021): Awaiting records at this time. 3. Hypertension, controlled  4. Hyperlipidemia on Lipitor  5. Hypothyroidism on HRT  6.  Left femur fracture with repair, cataracts, appendectomy, left hip surgery, right hip replacement,skin CA removal on face. HOME MEDICATIONS:  Prior to Admission medications    Medication Sig Start Date End Date Taking? Authorizing Provider   bumetanide (BUMEX) 1 MG tablet Take 2 mg by mouth daily   Yes Historical Provider, MD   metoprolol succinate (TOPROL XL) 25 MG extended release tablet Take 25 mg by mouth daily She takes half of this pill   Yes Historical Provider, MD   potassium chloride (KLOR-CON) 20 MEQ packet Take 20 mEq by mouth daily   Yes Historical Provider, MD   losartan (COZAAR) 25 MG tablet Take 25 mg by mouth daily   Yes Historical Provider, MD   omeprazole (PRILOSEC) 20 MG delayed release capsule Take 40 mg by mouth daily   Yes Historical Provider, MD   amLODIPine (NORVASC) 5 MG tablet Take 0.5 tablets by mouth daily  Patient taking differently: Take 10 mg by mouth daily  9/13/20   Ishmael Mann DO   furosemide (LASIX) 20 MG tablet Take 1 tablet by mouth daily 9/13/20   Ishmael Mann DO   atorvastatin (LIPITOR) 20 MG tablet Take 20 mg by mouth daily    Historical Provider, MD [address] [place] [address] Take by mouth 2 times daily    Historical Provider, MD   clopidogrel (PLAVIX) 75 MG tablet Take 1 tablet by mouth daily When cleared with orthopedics 8/23/15   Bryan Reilly DO   levothyroxine (SYNTHROID) 25 MCG tablet Take 25 mcg by mouth Daily    Historical Provider, MD   aspirin 81 MG EC tablet Take 1 tablet by mouth daily. 2/19/14   Ishmael Yan DO   rOPINIRole (REQUIP) 1 MG tablet Take 4 mg by mouth 2 times daily     Historical Provider, MD   VITAMIN D, CHOLECALCIFEROL, PO Take 400 mg by mouth daily.  5 pm    Historical Provider, MD       CURRENT MEDICATIONS:      Current Facility-Administered Medications:     guaiFENesin-dextromethorphan (ROBITUSSIN DM) 100-10 MG/5ML syrup 5 mL, 5 mL, Oral, Q4H PRN, Perhodan Dustin, DO    benzonatate (TESSALON) capsule 100 mg, 100 mg, Oral, TID PRN, Perhodan Dustin, DO, 100 mg at 04/27/22 0456    bumetanide (BUMEX) tablet 2 mg, 2 mg, Oral, Daily, Sinda Springs, DO    losartan (COZAAR) tablet 25 mg, 25 mg, Oral, Daily, Sinda [place], DO    metoprolol succinate (TOPROL XL) extended release tablet 25 mg, 25 mg, Oral, Daily, Sinda [place], DO    pantoprazole (PROTONIX) tablet 40 mg, 40 mg, Oral, QAM AC, Sinda [place], DO, 40 mg at 04/27/22 0610    potassium bicarb-citric acid (EFFER-K) effervescent tablet 20 mEq, 20 mEq, Oral, Daily, Sinda [place], DO    amLODIPine (NORVASC) tablet 10 mg, 10 mg, Oral, Daily, MICKI Marley CNP    aspirin EC tablet 81 mg, 81 mg, Oral, Daily, MICKI Marley CNP    atorvastatin (LIPITOR) tablet 20 mg, 20 mg, Oral, Daily, MICKI Marley CNP    cloNIDine (CATAPRES) tablet 0.1 mg, 0.1 mg, Oral, BID, MICKI Marley - CNP, 0.1 mg at 04/26/22 2316    clopidogrel (PLAVIX) tablet 75 mg, 75 mg, Oral, Daily, MICKI Marley - CNP, 75 mg at 04/26/22 2316    polycarbophil (FIBERCON) tablet 625 mg, 625 mg, Oral, BID, MICKI Marley - CNP, 625 mg at 04/26/22 2316    levothyroxine (SYNTHROID) tablet 25 mcg, 25 mcg, Oral, Daily, MICKI Marley - CNP, 25 mcg at 04/26/22 2316    rOPINIRole (REQUIP) tablet 4 mg, 4 mg, Oral, Nightly, MICKI Marley - CNP, 4 mg at 04/26/22 2316    vitamin D3 (CHOLECALCIFEROL) tablet 400 Units, 400 Units, Oral, Daily, MICKI Marley CNP    sodium chloride flush 0.9 % injection 5-40 mL, 5-40 mL, IntraVENous, 2 times per day, MICKI Marley - CNP, 10 mL at 04/26/22 2320    sodium chloride flush 0.9 % injection 5-40 mL, 5-40 mL, IntraVENous, PRN, MICKI Marley CNP    0.9 % sodium chloride infusion, , IntraVENous, PRN, MICKI Marley CNP    ondansetron (ZOFRAN-ODT) disintegrating tablet 4 mg, 4 mg, Oral, Q8H PRN **OR** ondansetron (ZOFRAN) injection 4 mg, 4 mg, IntraVENous, Q6H PRN, MICKI Marley CNP    polyethylene glycol (GLYCOLAX) packet 17 g, 17 g, Oral, Daily PRN, Sarah Hoven, APRN - CNP    acetaminophen (TYLENOL) tablet 650 mg, 650 mg, Oral, Q6H PRN **OR** acetaminophen (TYLENOL) suppository 650 mg, 650 mg, Rectal, Q6H PRN, Sarah Hoven, APRN - CNP    heparin (porcine) injection 5,000 Units, 5,000 Units, SubCUTAneous, 3 times per day, Sarah Hoven, APRN - CNP, 5,000 Units at 04/27/22 1912    furosemide (LASIX) injection 20 mg, 20 mg, IntraVENous, BID, Bren Garrett DO    melatonin tablet 3 mg, 3 mg, Oral, Nightly PRN, Marie Smith,       ALLERGIES:  Elemental sulfur, Ibuprofen, and Penicillins    SOCIAL HISTORY:    Housing: Per nursing lives alone  Tobacco: Quit smoking 34 years ago. 20-year use prior. EtOH: No history of abuse  Drugs: No history of abuse  Caffeine: Unable to evaluate this time  O2: Room air at home  Ambulation: Unable to evaluate at this time      FAMILY HISTORY:   Noncontributory as due to patient's advanced age. REVIEW OF SYSTEMS:     · As stated above HPI. PHYSICAL EXAM:   BP (!) 143/57   Pulse 90   Temp 98.9 °F (37.2 °C) (Oral)   Resp 12   Ht 4' 11\" (1.499 m)   Wt 145 lb 4.5 oz (65.9 kg)   SpO2 93%   BMI 29.34 kg/m²   CONST:  Well developed, well nourished who appears stated age. Patient currently on BiPAP with recent acute mental status change. Patient unable to stay awake to answer questions appropriately. HEENT:   Head- Normocephalic, atraumatic. Eyes- Conjunctivae pink, anicteric. Neck-  No stridor, trachea midline, no apparent jugular venous distention. CHEST: Chest symmetrical and non-tender to palpation. No accessory muscle use or intercostal retractions. RESPIRATORY: Lung sounds -rare wheeze upper right lobe. CARDIOVASCULAR:     No noted carotid bruit. Heart Ausculation- Regular rate and rhythm. 2-7/5 systolic murmur right sternal border. PV: Trace +1 pitting edema bilateral lower extremities. No varicosities. Pedal pulses palpable, no clubbing or cyanosis.    ABDOMEN: Soft, non-tender to light palpation. Bowel sounds present. MS: Good muscle strength and tone. No atrophy or abnormal movements. SKIN: Warm and dry. No statis dermatitis or ulcers. NEURO / PSYCH: Opens eyes to calling patient's name, but unable to stay awake to answer questions. Patient also on BiPAP at this time. DATA:   EKG: NSR, Vent rate 70, UT int. 176, QRS dur. 96  Telemetry: NSR with rare PVC's    Diagnostic:  All diagnostic testing and lab work thus far this admission reviewed in detail. Intake/Output Summary (Last 24 hours) at 4/27/2022 0734  Last data filed at 4/26/2022 2202  Gross per 24 hour   Intake --   Output 750 ml   Net -750 ml       Labs:   CBC:   Recent Labs     04/26/22  1454 04/27/22  0635   WBC 13.6* 14.7*   HGB 10.2* 9.9*   HCT 33.0* 33.2*    207   Results for Dorys Yeager (MRN [id number]) as of 4/27/2022 07:53   Ref. Range 4/26/2022 17:15   Procalcitonin Latest Ref Range: 0.00 - 0.08 ng/mL 0.35 (H)       BMP:   Recent Labs     04/26/22  1454 04/26/22  1454 04/27/22  0627 04/27/22  0635     --   --  136   K 4.1   < > 4.14 4.2   CO2 29  --   --  31*   BUN 29*  --   --  26*   CREATININE 1.4*  --   --  1.5*   LABGLOM 35  --   --  33   CALCIUM 9.1  --   --  9.0    < > = values in this interval not displayed. Mag:   Recent Labs     04/26/22  1454   MG 1.9     TSH:   Recent Labs     04/26/22  1924   TSH 0.822     HgA1c:   Lab Results   Component Value Date    LABA1C 5.9 02/15/2014       Lab Results   Component Value Date    CHOL 187 04/05/2021    HDL 95 04/19/2022    LDLCALC 71 04/19/2022    TRIG 85 04/05/2021     LIVER PROFILE:  Recent Labs     04/26/22  1454 04/27/22  0635   AST 34* 38*   ALT 22 21   LABALBU 3.6 3.3*     Results for Dorys Yeager (MRN [id number]) as of 4/27/2022 07:53   Ref.  Range 4/26/2022 17:15   INFLUENZA A Latest Ref Range: NOT DETECTED  NOT DETECTED   INFLUENZA B Latest Ref Range: NOT DETECTED  NOT DETECTED   SARS-CoV-2 RNA, RT PCR Latest Ref Range: NOT DETECTED  NOT DETECTED       Results for Román Landaverde (MRN [id number]) as of 4/27/2022 07:53   Ref. Range 4/27/2022 06:30   Color, UA Latest Ref Range: Straw/Yellow  Yellow   Clarity, UA Latest Ref Range: Clear  Clear   Glucose, UA Latest Ref Range: Negative mg/dL Negative   Bilirubin, Urine Latest Ref Range: Negative  Negative   Ketones, Urine Latest Ref Range: Negative mg/dL Negative   Specific Gravity, UA Latest Ref Range: 1.005 - 1.030  1.020   Blood, Urine Latest Ref Range: Negative  LARGE (A)   pH, UA Latest Ref Range: 5.0 - 9.0  5.5   Protein, UA Latest Ref Range: Negative mg/dL 100 (A)   Urobilinogen, Urine Latest Ref Range: <2.0 E.U./dL 0.2   Nitrite, Urine Latest Ref Range: Negative  Negative   Leukocyte Esterase, Urine Latest Ref Range: Negative  TRACE (A)   WBC, UA Latest Ref Range: 0 - 5 /HPF 0-1   RBC, UA Latest Ref Range: 0 - 2 /HPF >20   Bacteria, UA Latest Ref Range: None Seen /HPF RARE (A)     HScTNT: 45->45  ProBNP: 4,727    CXR:4/26/22  Impression   Multifocal bilateral pneumonia, right greater than left.  Cardiomegaly.       4/27/22      Impression   Worsening right upper lobe consolidation.  Continued follow-up recommended. US Bilat LLE:     Impression   Within the visualized vessels there is no evidence for deep venous   thrombosis           ASSESSMENT:  1. Acute hypercapnic/hypoxic respiratory failure. 2. Possible component of acute on chronic HFpEF:  · Echo 9/12/2020, EF 61%, evidence of stage I diastolic dysfunction, moderately sclerosed aortic valve, mild AR, moderate AS, mild TR. · Appears mildly hypervolemic on exam, proBNP 4700  3. Right upper lobe pneumonia, being managed by primary care team, now on ATB. Leukocytosis and elevated procalcitonin. 4. Altered mental status, most likely secondary to hypercapnia. Patient now on BiPAP and being managed by primary care team.  5. CARLOS/on likely CKD.   Cr baseline appears around 1.0 to 1.1, now 1.4 --> 1.5.  6. AS s/p reported TAVR ( 2021), awaiting records. 7. Hypertension, controlled  8. HLD on Lipitor  9. Hypothyroidism on HRT  10. DNR-CCA code status      RECOMMENDATIONS:  1. Complete echocardiogram to evaluate LVEF and VHD (TAVR)  2. Continue IV diuresis with Lasix 20 mg twice daily  3. Hold clonidine at this time, IV hydralazine as needed for SBP >160  4. Strict I&O's with daily weights, monitor renal function. 5. Continue home cardiac medications including Toprol-XL, potassium, Norvasc, Lipitor, Plavix, Losartan, ASA. 6. Continue to monitor renal function and consider discontinuation of losartan in presence of acute azotemia. 7. Awaiting records from Dr. Sundeep Silvestre and [facility] for TAVR procedure. 8. CHF clinic referral  9. Will follow with further recommendations per Dr. Cherelle Christian. The above case and recommendations have been discussed and made in collaboration with Dr. Cherelle Christian. NOTE: This report was transcribed using voice recognition software. Every effort was made to ensure accuracy; however, inadvertent computerized transcription errors may be present.       Lance Carrillo, [address] [place] [address], Amado Palumbo  Cardiology    Electronically signed by Shaun Edwards PA-C on 4/27/2022 at 7:34 AM

## 2022-04-27 NOTE — PLAN OF CARE
Problem: Discharge Planning  Goal: Discharge to home or other facility with appropriate resources  Outcome: Progressing     Problem: Safety - Adult  Goal: Free from fall injury  Outcome: Completed     Problem: ABCDS Injury Assessment  Goal: Absence of physical injury  Outcome: Completed     Problem: Pain  Goal: Verbalizes/displays adequate comfort level or baseline comfort level  Outcome: Progressing

## 2022-04-27 NOTE — PROGRESS NOTES
Hospitalist Progress Note      Synopsis: Patient admitted for shortness of breath  : Ms. Carie Ojeda, a 80y.o. year old female with a recent TAVR at [facility] in 2021, who was recently seen by her primary care provider with complaint of shortness of breath, and lower extremity edema. Her primary care provider recommended her come to the ER for further evaluation and diuresis. Patient lives with her daughter and up until about 3 weeks ago was very active and able to ambulate well around her home with a walker. Patient's daughter states over the last 3 weeks her shortness of breath has gotten progressively worse. : RRT was called due to change in mental status, patient's CODE STATUS was changed to DNR CCA. Pulmonology consulted for BiPAP dependence. Hospital day 1     Subjective:  Patient seen and examined at bedside with daughter present. Patient off BiPAP with pleasant demeanor, doing well. Patient states she is breathing better and that her and her daughter both agree with the DNR status. Told would like antibiotic treatment and use of BiPAP. No issues reported. Patient seen and examined  Records reviewed. Temp (24hrs), Av.4 °F (36.9 °C), Min:97 °F (36.1 °C), Max:98.9 °F (37.2 °C)    DIET: ADULT DIET; Regular; Low Sodium (2 gm)  CODE: DNR-CCA    Intake/Output Summary (Last 24 hours) at 2022 0837  Last data filed at 2022 2202  Gross per 24 hour   Intake --   Output 750 ml   Net -750 ml       Review of Systems: All bolded are positive; please see HPI  General:  Fever, chills, diaphoresis, fatigue, malaise, night sweats, weight loss  Psychological:  Anxiety, disorientation, hallucinations. ENT:  Epistaxis, headaches, vertigo, visual changes. Cardiovascular:  Chest pain, irregular heartbeats, palpitations, paroxysmal nocturnal dyspnea. Respiratory:  Shortness of breath, coughing, sputum production, hemoptysis, wheezing, orthopnea.   Gastrointestinal:  Nausea, vomiting, diarrhea, heartburn, constipation, abdominal pain, hematemesis, hematochezia, melena, acholic stools  Genito-Urinary:  Dysuria, urgency, frequency, hematuria  Musculoskeletal:  Joint pain, joint stiffness, joint swelling, muscle pain  Neurology:  Headache, focal neurological deficits, weakness, numbness, paresthesia  Derm:  Rashes, ulcers, excoriations, bruising  Extremities:  Decreased ROM, peripheral edema, mottling    Objective:    BP (!) 115/43   Pulse 75   Temp 97 °F (36.1 °C) (Temporal)   Resp 12   Ht 4' 11\" (1.499 m)   Wt 145 lb 4.5 oz (65.9 kg)   SpO2 96%   BMI 29.34 kg/m²     General appearance: Elderly female, visibly uncomfortable on BiPAP, appears stated age and cooperative. Respiratory: Rhonchi and wheezes in upper airway, crackles in lower lung fields to auscultation bilaterally. Tachypneic and labored respiratory effort. Cardiovascular:  RRR. S1, S2 without MRG. PV: Pulses palpable. 3 + pitting edema in bilateral lower extremities  Abdomen: Soft, non-tender, non-distended. +BS  Musculoskeletal: No obvious deformities. Skin: Normal skin color pale. Neurologic:  Grossly non-focal. Awake, alert to voice, not following commands.    Psychiatric: Alert and oriented x1, thought content inappropriate judgement    Medications:  REVIEWED DAILY    Infusion Medications    sodium chloride       Scheduled Medications    losartan  25 mg Oral Daily    metoprolol succinate  25 mg Oral Daily    pantoprazole  40 mg Oral QAM AC    potassium bicarb-citric acid  20 mEq Oral Daily    amLODIPine  10 mg Oral Daily    aspirin  81 mg Oral Daily    atorvastatin  20 mg Oral Daily    cloNIDine  0.1 mg Oral BID    clopidogrel  75 mg Oral Daily    polycarbophil  625 mg Oral BID    levothyroxine  25 mcg Oral Daily    rOPINIRole  4 mg Oral Nightly    vitamin D3  400 Units Oral Daily    sodium chloride flush  5-40 mL IntraVENous 2 times per day    heparin (porcine)  5,000 Units SubCUTAneous 3 times per day    furosemide  20 mg IntraVENous BID     PRN Meds: guaiFENesin-dextromethorphan, benzonatate, sodium chloride flush, sodium chloride, ondansetron **OR** ondansetron, polyethylene glycol, acetaminophen **OR** acetaminophen, melatonin    Labs:     Recent Labs     04/26/22  1454 04/27/22  0635   WBC 13.6* 14.7*   HGB 10.2* 9.9*   HCT 33.0* 33.2*    207       Recent Labs     04/26/22  1454 04/27/22  0627 04/27/22  0635     --  136   K 4.1 4.14 4.2   CL 97*  --  94*   CO2 29  --  31*   BUN 29*  --  26*   CREATININE 1.4*  --  1.5*   CALCIUM 9.1  --  9.0       Recent Labs     04/26/22  1454 04/27/22  0635   PROT 6.9 6.6   ALKPHOS 119* 154*   ALT 22 21   AST 34* 38*   BILITOT 0.4 0.4       No results for input(s): INR in the last 72 hours. No results for input(s): Towana Ball in the last 72 hours. Chronic labs:    Lab Results   Component Value Date    CHOL 187 04/05/2021    TRIG 85 04/05/2021    HDL 95 04/19/2022    LDLCALC 71 04/19/2022    TSH 0.822 04/26/2022    INR 0.9 08/06/2021    LABA1C 5.9 02/15/2014       Radiology: REVIEWED DAILY    Assessment:  · Acute on chronic congestive heart failure  · Acute respiratory failure with hypoxia  · Possible pneumonia  · Elevated troponin at 44  · Leukocytosis 14.7  · Hypertension  · Hyperlipidemia  · Heart murmur  · Arthritis  · Hx of TAVR in oct of 2021    Plan:  · CODE STATUS changed to limited, no chest compressions, no ACLS medications, no intubation.   · BNP 4700 on presentation  · Echocardiogram ordered  · Start Toprol XL 25 mg  · Daily weights  · BiPAP   · Pulmonology consulted  · Procalcitonin 0.35  · Strict intake and output  · IV Lasix 20 mg BID  · Start IV Levaquin   · Monitor electrolytes, especially K  · cardiology consult  · Continue home medications Norvasc 2.5 mg, aspirin 81 mg, Lipitor 20 mg, Catapres 0.1 mg, Plavix 75 mg, Synthroid 25 mg,        DVT Prophylaxis [] Lovenox  [x]  Heparin [] DOAC [] PCDs [] Ambulation    GI Prophylaxis [] PPI  [] H2 Blocker   [] Carafate  [x] Diet/Tube Feeds   Level of care [] Med/Surg  [] Intermediate  []  ICU   Diet ADULT DIET; Regular; Low Sodium (2 gm)    Family contact []  N/A    [x] At bedside  [] Phone call   Disposition Patient requires continued admission for evaluation of redness of breath   MDM [] Low    [x] Moderate  []   High       Discharge Plan: To home, possibly with home health care, when clinically stable    +++++++++++++++++++++++++++++++++++++++++++++++++  MICKI Richardson  [facility] Emergency Department Physician - [address] [place] [address], [place]  +++++++++++++++++++++++++++++++++++++++++++++++++  NOTE: This report was transcribed using voice recognition software. Every effort was made to ensure accuracy; however, inadvertent computerized transcription errors may be present.

## 2022-04-27 NOTE — PROGRESS NOTES
Nutrition Education      Provided educational handouts and sample meal plans on heart healthy/cardiac diet. Information in discharge instructions d/t patient being confused since admission. Will attempt to educate patient when she becomes less confused.        Velasquez Contreras RD, LD  Contact Number: [phone]

## 2022-04-27 NOTE — PATIENT CARE CONFERENCE
[facility] Quality Flow/Interdisciplinary Rounds Progress Note        Quality Flow Rounds held on April 27, 2022    Disciplines Attending:  Bedside Nurse, ,  and Nursing Unit Leadership    Edin Evans was admitted on 4/26/2022  2:32 PM    Anticipated Discharge Date:       Disposition:    Avi Score:  Avi Scale Score: 20    Readmission Risk              Risk of Unplanned Readmission:  16           Discussed patient goal for the day, patient clinical progression, and barriers to discharge.   The following Goal(s) of the Day/Commitment(s) have been identified:  Diagnostics - Report Results and Education/Learning - Alternate Learner      Hoda Humphrey RN  April 27, 2022

## 2022-04-27 NOTE — SIGNIFICANT EVENT
Rapid Response Team Note  Date of event: 4/27/2022   Time of event: Rolanda Reid 80y.o. year old female   YOB: 1931   Admit date:  4/26/2022   Location: [facility]/6502-   Witnessed? : [x]Yes  [] No  Monitored? : [x]Yes  [] No  Code status: [] Full  [x] DNR-CCA  []DNR-CC  ______________________________________________________________________  Reason for RRT:    [] RR < 8     [] RR > 28   [] SpO2 <90%   [] HR < 40 bpm   [] HR > 130 bpm  [] SBP < 90 mmHg    [] SpO2 <90%   [] LOC   [] Seizures    [] Significant Bleeding Event    [x] Other: AMS     Subjective:   CTSP regarding the above event. Upon arrival, patient was hemodynamically stable saturating 90% on nonrebreather. She was AOx3. Per reports, patient was much more awake overnight. She removed her nasal cannula earlier so they put her back and her saturation did not improve so she was placed on nonrebreather. Stat ABG was ordered showed pH of 7.253, and CO2 of 80 so patient was placed on BiPAP. Stat CXR showed worsening right upper lobe consolidation. CBC, CMP, lactic acid, UA and ammonia were ordered and all came back within normal limits. Her AMS could likely be secondary to hypercapnia.   Repeat ABG was ordered for 9 AM.    Objective:   Vital signs: Temp: 98.9/BP: 143/57/RR: 20/HR: 90  Initial Condition:  Conscious   [x] Yes  [] No     Breathing [x] Yes  [] No     Pulse  [x] Yes  [] No    Airway:   [x] Open/ Clear     Intervention: [] None  [] Pooled secretions     [] Suctioned  [] Stridor      [] Intubation    Lungs:   [x] Symmetrical chest rise/ CTABL Intervention: [] None  [] Use of accessory muscles    [x] NIV (CPAP/BiPAP)  [] Cyanosis      [] Nasal Oxygen/Mask  [] Wheezing       [x] ABG             [x] CXR  [] Other:    Circulation:   Rhythm:  [x] Sinus [] Other:  Intervention: [x] None            [] IV Access  [] Peripheral              [] Central            [] EKG            [] Cardioversion            [] Defibrillation     Capillary Refill:  [] > 2 seconds [] < 2 seconds    Neurologic:   [] NIHSS      [] Pupillary Response:  equal   Response to pain:   [] Yes  [] No  Follow commands:  [] Yes  [] No  Facial asymmetry:  [] Yes  [] No  Motor strength:  [] Equal  [] Focal deficit: none  Diagnostic Test:  Blood Sugar:  161 mg/dL  EKG:    none  ABG:      Lab Results   Component Value Date    PH 7.253 04/27/2022    PCO2 80.0 04/27/2022    PO2 195.7 04/27/2022    HCO3 34.5 04/27/2022    O2SAT 99.2 04/27/2022     Medication(s) Given:  []  Narcan (Naloxone)   []  Romazicon (Flumazenil)    []  Breathing Treatment    []  Steroids (Prednisone, Solu-Medrol)  []  Adenosine  [] Cardiac Medicines:    Infusion(s):   [] Normal Saline    Amount:       [] Lactate Ringers      [] Other:     Imaging:   [x] CXR:   [] Normal         [] Pneumothorax         [] Pulmonary Edema  [x] Infiltrate          [] CT Head  [] Normal          [] ICB          [] [facility]          [] Other:    Laboratory Tests:     CBC:   Lab Results   Component Value Date    WBC 14.7 04/27/2022    RBC 3.84 04/27/2022    HGB 9.9 04/27/2022    HCT 33.2 04/27/2022    MCV 86.5 04/27/2022    MCH 25.8 04/27/2022    MCHC 29.8 04/27/2022    RDW 18.3 04/27/2022     04/27/2022    MPV 10.8 04/27/2022     CMP:    Lab Results   Component Value Date     04/27/2022    K 4.2 04/27/2022    CL 94 04/27/2022    CO2 31 04/27/2022    BUN 26 04/27/2022    CREATININE 1.5 04/27/2022    GFRAA 39 04/27/2022    LABGLOM 33 04/27/2022    GLUCOSE 162 04/27/2022    GLUCOSE 102 04/06/2012    PROT 6.6 04/27/2022    LABALBU 3.3 04/27/2022    LABALBU 4.7 04/06/2012    CALCIUM 9.0 04/27/2022    BILITOT 0.4 04/27/2022    ALKPHOS 154 04/27/2022    AST 38 04/27/2022    ALT 21 04/27/2022         Teams Assessment and Plan:  1. AMS likely 2./2 hypercapnia. R/o metabolic causes     - BiPAP ordered  - Repeat ABG in 2 hrs   - CMP, LA, ammonia, UA --> all wnl   ?  ?  ?   Disposition:  [x] No transfer   [] Transfer to monitor floor  [] Transfer to: [] MICU [] NICU [] CCU [] SICU    Patients family updated: [] Yes  [] No   Discussed with:  [] Critical Care Intensivist:       [] Primary Care Provider: Dr David Bonilla      [] Other:?    Merlin Petit MD PGY-3  4/27/2022 7:51 AM  Attending Physician: Dr David Bonilla

## 2022-04-28 LAB
AADO2: 264.1 MMHG
ANION GAP SERPL CALCULATED.3IONS-SCNC: 11 MMOL/L (ref 7–16)
B.E.: 6.4 MMOL/L (ref -3–3)
BUN BLDV-MCNC: 25 MG/DL (ref 6–23)
CALCIUM SERPL-MCNC: 8.9 MG/DL (ref 8.6–10.2)
CHLORIDE BLD-SCNC: 94 MMOL/L (ref 98–107)
CO2: 32 MMOL/L (ref 22–29)
COHB: 0.1 % (ref 0–1.5)
CREAT SERPL-MCNC: 1.4 MG/DL (ref 0.5–1)
CRITICAL: ABNORMAL
DATE ANALYZED: ABNORMAL
DATE OF COLLECTION: ABNORMAL
FIO2: 60 %
GFR AFRICAN AMERICAN: 43
GFR NON-AFRICAN AMERICAN: 35 ML/MIN/1.73
GLUCOSE BLD-MCNC: 116 MG/DL (ref 74–99)
HCO3: 33.3 MMOL/L (ref 22–26)
HCT VFR BLD CALC: 31.6 % (ref 34–48)
HEMOGLOBIN: 9.3 G/DL (ref 11.5–15.5)
HHB: 4.7 % (ref 0–5)
LAB: ABNORMAL
LV EF: 58 %
LVEF MODALITY: NORMAL
Lab: ABNORMAL
MAGNESIUM: 1.9 MG/DL (ref 1.6–2.6)
MCH RBC QN AUTO: 25.6 PG (ref 26–35)
MCHC RBC AUTO-ENTMCNC: 29.4 % (ref 32–34.5)
MCV RBC AUTO: 87.1 FL (ref 80–99.9)
METHB: 0.4 % (ref 0–1.5)
MODE: ABNORMAL
O2 CONTENT: 13.6 ML/DL
O2 SATURATION: 95.3 % (ref 92–98.5)
O2HB: 94.8 % (ref 94–97)
OPERATOR ID: 8219
PATIENT TEMP: 37 C
PCO2: 61.9 MMHG (ref 35–45)
PDW BLD-RTO: 18.3 FL (ref 11.5–15)
PEEP/CPAP: 5 CMH2O
PFO2: 1.34 MMHG/%
PH BLOOD GAS: 7.35 (ref 7.35–7.45)
PIP: 16 CMH2O
PLATELET # BLD: 192 E9/L (ref 130–450)
PMV BLD AUTO: 10.7 FL (ref 7–12)
PO2: 80.6 MMHG (ref 75–100)
POTASSIUM SERPL-SCNC: 4.3 MMOL/L (ref 3.5–5)
PROCALCITONIN: 0.26 NG/ML (ref 0–0.08)
RBC # BLD: 3.63 E12/L (ref 3.5–5.5)
RI(T): 3.28
SODIUM BLD-SCNC: 137 MMOL/L (ref 132–146)
SOURCE, BLOOD GAS: ABNORMAL
THB: 10.1 G/DL (ref 11.5–16.5)
TIME ANALYZED: 1521
WBC # BLD: 13.5 E9/L (ref 4.5–11.5)

## 2022-04-28 PROCEDURE — 82805 BLOOD GASES W/O2 SATURATION: CPT

## 2022-04-28 PROCEDURE — 36415 COLL VENOUS BLD VENIPUNCTURE: CPT

## 2022-04-28 PROCEDURE — 99233 SBSQ HOSP IP/OBS HIGH 50: CPT | Performed by: INTERNAL MEDICINE

## 2022-04-28 PROCEDURE — 6360000002 HC RX W HCPCS: Performed by: INTERNAL MEDICINE

## 2022-04-28 PROCEDURE — 94640 AIRWAY INHALATION TREATMENT: CPT

## 2022-04-28 PROCEDURE — 2580000003 HC RX 258: Performed by: INTERNAL MEDICINE

## 2022-04-28 PROCEDURE — 94660 CPAP INITIATION&MGMT: CPT

## 2022-04-28 PROCEDURE — 85027 COMPLETE CBC AUTOMATED: CPT

## 2022-04-28 PROCEDURE — 83735 ASSAY OF MAGNESIUM: CPT

## 2022-04-28 PROCEDURE — 84145 PROCALCITONIN (PCT): CPT

## 2022-04-28 PROCEDURE — 80048 BASIC METABOLIC PNL TOTAL CA: CPT

## 2022-04-28 PROCEDURE — 6360000002 HC RX W HCPCS

## 2022-04-28 PROCEDURE — 2580000003 HC RX 258

## 2022-04-28 PROCEDURE — 2140000000 HC CCU INTERMEDIATE R&B

## 2022-04-28 PROCEDURE — 6370000000 HC RX 637 (ALT 250 FOR IP): Performed by: FAMILY MEDICINE

## 2022-04-28 PROCEDURE — 93306 TTE W/DOPPLER COMPLETE: CPT

## 2022-04-28 PROCEDURE — 6370000000 HC RX 637 (ALT 250 FOR IP)

## 2022-04-28 PROCEDURE — 6370000000 HC RX 637 (ALT 250 FOR IP): Performed by: INTERNAL MEDICINE

## 2022-04-28 PROCEDURE — 2700000000 HC OXYGEN THERAPY PER DAY

## 2022-04-28 RX ADMIN — CALCIUM POLYCARBOPHIL 625 MG: 625 TABLET, FILM COATED ORAL at 09:27

## 2022-04-28 RX ADMIN — Medication 10 ML: at 09:27

## 2022-04-28 RX ADMIN — ROPINIROLE HYDROCHLORIDE 4 MG: 2 TABLET, FILM COATED ORAL at 21:10

## 2022-04-28 RX ADMIN — ATORVASTATIN CALCIUM 20 MG: 20 TABLET, FILM COATED ORAL at 09:27

## 2022-04-28 RX ADMIN — CLOPIDOGREL BISULFATE 75 MG: 75 TABLET ORAL at 09:28

## 2022-04-28 RX ADMIN — HEPARIN SODIUM 5000 UNITS: 10000 INJECTION INTRAVENOUS; SUBCUTANEOUS at 05:38

## 2022-04-28 RX ADMIN — FUROSEMIDE 20 MG: 10 INJECTION, SOLUTION INTRAMUSCULAR; INTRAVENOUS at 09:28

## 2022-04-28 RX ADMIN — PANTOPRAZOLE SODIUM 40 MG: 40 TABLET, DELAYED RELEASE ORAL at 05:38

## 2022-04-28 RX ADMIN — ASPIRIN 81 MG: 81 TABLET, COATED ORAL at 09:28

## 2022-04-28 RX ADMIN — MEROPENEM 1000 MG: 1 INJECTION, POWDER, FOR SOLUTION INTRAVENOUS at 23:28

## 2022-04-28 RX ADMIN — POTASSIUM BICARBONATE 20 MEQ: 782 TABLET, EFFERVESCENT ORAL at 09:27

## 2022-04-28 RX ADMIN — IPRATROPIUM BROMIDE AND ALBUTEROL SULFATE 1 AMPULE: .5; 2.5 SOLUTION RESPIRATORY (INHALATION) at 16:44

## 2022-04-28 RX ADMIN — MEROPENEM 1000 MG: 1 INJECTION, POWDER, FOR SOLUTION INTRAVENOUS at 12:18

## 2022-04-28 RX ADMIN — LEVOTHYROXINE SODIUM 25 MCG: 0.03 TABLET ORAL at 05:38

## 2022-04-28 RX ADMIN — HEPARIN SODIUM 5000 UNITS: 10000 INJECTION INTRAVENOUS; SUBCUTANEOUS at 14:09

## 2022-04-28 RX ADMIN — IPRATROPIUM BROMIDE AND ALBUTEROL SULFATE 1 AMPULE: .5; 2.5 SOLUTION RESPIRATORY (INHALATION) at 20:38

## 2022-04-28 RX ADMIN — IPRATROPIUM BROMIDE AND ALBUTEROL SULFATE 1 AMPULE: .5; 2.5 SOLUTION RESPIRATORY (INHALATION) at 12:48

## 2022-04-28 RX ADMIN — Medication 10 ML: at 21:11

## 2022-04-28 RX ADMIN — LOSARTAN POTASSIUM 25 MG: 25 TABLET, FILM COATED ORAL at 09:27

## 2022-04-28 RX ADMIN — FUROSEMIDE 20 MG: 10 INJECTION, SOLUTION INTRAMUSCULAR; INTRAVENOUS at 17:23

## 2022-04-28 RX ADMIN — HEPARIN SODIUM 5000 UNITS: 10000 INJECTION INTRAVENOUS; SUBCUTANEOUS at 21:10

## 2022-04-28 RX ADMIN — METOPROLOL SUCCINATE 25 MG: 25 TABLET, EXTENDED RELEASE ORAL at 09:27

## 2022-04-28 RX ADMIN — IPRATROPIUM BROMIDE AND ALBUTEROL SULFATE 1 AMPULE: .5; 2.5 SOLUTION RESPIRATORY (INHALATION) at 09:22

## 2022-04-28 RX ADMIN — AMLODIPINE BESYLATE 10 MG: 10 TABLET ORAL at 09:27

## 2022-04-28 NOTE — PATIENT CARE CONFERENCE
[facility] Quality Flow/Interdisciplinary Rounds Progress Note        Quality Flow Rounds held on April 28, 2022    Disciplines Attending:  Bedside Nurse, ,  and Nursing Unit Leadership    Carolina Cassidy was admitted on 4/26/2022  2:32 PM    Anticipated Discharge Date:  Expected Discharge Date: 04/29/22    Disposition:    Avi Score:  Avi Scale Score: 20    Readmission Risk              Risk of Unplanned Readmission:  16           Discussed patient goal for the day, patient clinical progression, and barriers to discharge.   The following Goal(s) of the Day/Commitment(s) have been identified:  Diagnostics - Report Results      Cristine Kowalski RN  April 28, 2022

## 2022-04-28 NOTE — PROGRESS NOTES
Called into room by patient's daughter- patient's RUE tremoring & BLE also with tremors. Patient alert but somewhat lethargic on bipap.  Dr. Renetta Dykes notified & orders obtained

## 2022-04-28 NOTE — CONSULTS
Patient currently admitted with diagnosis of Diastolic heart failure. Patient was laying in bed alert and oriented, she stated that I should call her daughter, Christiano Chavez to complete the consultation  She was engaged and asked appropriate questions throughout the education session. She is agreeable to to ensure that patient performs self monitoring, follows a low sodium diet, and calls to schedule a same day appointment with the CHF clinic when needed. Scheduling with the CHF clinic and Cardiology APRN Yes. Future Appointments   Date Time Provider Eduardo Howe   5/23/2022  8:30 AM Daisy Cobian, APRN - CNP [facility] CARDIO [facility]            We reviewed the introduction to Heart Failure, the HF zones, signs and symptoms to report on day 1 of onset, medications, medication compliance, the importance of obtaining daily weights, following a low sodium diet, reading food labels for the sodium content, keeping physician appointments, and smoking cessation. We discussed writing / tracking daily weights on a calendar / log because a 5 pound gain in 1 week can sneak up if you are not tracking it. Contributing risk factors for Heart Failure are identified as none. I advised patient they can reduce the risk for Heart Failure exacerbations by modifying / controlling the risk factors. We discussed self-managed care which includes the following:  to take medications as prescribed, report any intolerable side effects of medications to the cardiologist / doctor, do not just stop taking the medication; follow a cardiac heart healthy / low sodium diet; weigh yourself daily, exercise regularly- per doctor recommendation and not to smoke or use an excess amount of alcohol. We discussed calling the cardiologist / doctor with a weight gain of 3 pounds in one day or a total of 5 pounds or more in one week.  Also, if you should have a significant weight loss of 3# or more in one day to call the doctor, they may need to decrease or hold the diuretic dose. On days you feels nauseated and not eating / drinking, having emesis or diarrhea,  informed to call the cardiologist  / doctor, they may need to decrease or hold diuretic to avoid dehydration. I stressed the importance of informing their cardiologist the first day of onset of any of the signs and symptoms in the \"Yellow Zone\" of the HF Zones. Patient verbalizes understanding. Greater than 30 minutes was spent educating patient. The Heart Failure Booklet given to the patient with additional patient education addressing:  · What is Heart Failure? · Things You Can Do to Live Well with HF  · How to Take Your Medications  · How to Eat Less Salt  · [place] its Salt? · Exercising Well with Heart Failure  · Signs and symptoms of HF to report  · Weight Yourself Each Day  · Home Self Management- activity, weight tracking, taking medications as prescribed, meals /diet planning (sodium and fluid restriction), how to read food labels, keeping physician follow ups, smoking cessation, follow the Heart Failure Zones  · The Heart Failure zones  · Every Dose Every Day      Instructed  to call 911 if you have any of the following symptoms:    ·    Struggling to breathe unrelieved with rest,  ·    Having chest pain  ·    Have confusion or cant think clearly      The patient is ordered:  Diet: ADULT DIET; Regular;  Low Sodium (2 gm)   Sodium controlled diet Yes  Fluid restriction daily ordered (fluid restriction recommended if patient is hyponatremic and/or diuretic is initiated or increased) No  FR:   Daily Weights:   Patient Vitals for the past 96 hrs (Last 3 readings):   Weight   04/28/22 0402 139 lb 8.8 oz (63.3 kg)   04/26/22 2156 145 lb 4.5 oz (65.9 kg)   04/26/22 2130 145 lb 4.5 oz (65.9 kg)     I/O:     Intake/Output Summary (Last 24 hours) at 4/28/2022 1257  Last data filed at 4/28/2022 1218  Gross per 24 hour   Intake 540 ml   Output 1500 ml   Net -960 ml            Дмитрий Zepeda RN BSN, RN  Heart Failure Navigator        CONGESTIVE HEART FAILURE (CHF) AHA GUIDELINES  (Must be completed for Primary Diagnosis CHF or History of CHF)    Discharge Plan:  I placed the Heart Failure Home Instructions in patient's discharge instructions. Per Heart Failure GWTG, the patient should have a follow-up appointment made within 7 days of discharge.     New Diagnosis No    ECHO Results most recent:  Lab Results   Component Value Date    LVEF 61 2020                                        Social History     Tobacco Use   Smoking Status Former Smoker    Years: 20.00    Quit date: 1987    Years since quittin.7   Smokeless Tobacco Never Used        Immunization History   Administered Date(s) Administered    COVID-19, Pfizer Purple top, DILUTE for use, 12+ yrs, 30mcg/0.3mL dose 2021, 2021, 10/17/2021          Angiotensin-Converting-Enzyme (ACE) inhibitor ordered:  [] Yes  [x] No (specify contraindication):    [] Contraindicated  [] Hypotensive patient who was at immediate risk of cardiogenic shock  [] Hospitalized patient who experienced marked azotemia  [] Other Contraindications  [] Not Eligible  [] Not Tolerant  [] Patient Reason  [] System Reason  [] Other Reason    Angiotensin II receptor blockers (ARB) ordered:  [x] Yes  [] No (specify contraindication):    [] Contraindicated  [] Hypotensive patient who was at immediate risk of cardiogenic shock  [] Hospitalized patient who experienced marked azotemia  [] Other Contraindications    ARNI - Angiotensin Receptor Neprilysin Inhibitor ordered:  [] Yes  [x] No (specify contraindication):    [] ACE inhibitor use within the prior 36 hours  [] Allergy  [] Hyperkalemia  [] Hypotension  [] Renal dysfunction defined as creatinine > 2.5 mg/dL in men or > 2.0 mg/dL in women  [] Other Contraindications  [x] Not Eligible  [] Not Tolerant  [] Patient Reason  []System Reason  []Other Reason      Beta Blocker (Carvedilol, Metoprolol Succinate, or Bisoprolol) ordered:    [x] Yes Toprol XL  [] No (specify contraindication):    [] Contraindicated  [] Asthma  [] Fluid Overload  [] Low Blood Pressure  [] Patient recently treated with an intravenous positive inotropic agent  [] Other Contraindications  [] Not Eligible  [] Not Tolerant  [] Patient Reason  [] System Reason    SGLT2 Inhibitor ordered:  [] Yes  [] No (specify contraindication):    [] Contraindicated  [] Patient currently on dialysis  [] Ketoacidosis  [] Known hypersensitivity to the medication  [] Type I diabetes (not approved for use in patients with Type I diabetes due to increased risk of ketoacidosis)  [] Other Contraindications  [] Not Eligible  [] Not Tolerant  [] Patient Reason  [] System Reason  [] Other Reason    Aldosterone Antagonist ordered:  [] Yes  [] No (specify contraindication):    [] Contraindicated  [] Allergy due to aldosterone receptor antagonist  [] Hyperkalemia  [] Renal dysfunction defined as creatinine >2.5 mg/dL in men or >2.0 mg/dL in women.   [] Other contraindications  [] Not Eligible  [] Not Tolerant  [] Patient Reason  [] System Reason  [] Other Reason

## 2022-04-28 NOTE — PROGRESS NOTES
Pharmacy Consultation Note  (Antibiotic Dosing and Monitoring)    Initial consult date: 4/27/22  Consulting physician/provider: Dr. Sima Garcia  Drug: Vancomycin  Indication: PNA    Age/  Gender Height Weight IBW  Allergy Information   89 y.o./female 4' 11\" (149.9 cm) 145 lb 4.5 oz (65.9 kg)     Ideal body weight: 48.8 kg (107 lb 8.4 oz)  Adjusted ideal body weight: 55.6 kg (122 lb 10 oz)   Elemental sulfur, Ibuprofen, and Penicillins      Renal Function:  Recent Labs     04/26/22  1454 04/27/22  0600 04/27/22  0635   BUN 29* 26* 26*   CREATININE 1.4* 1.5* 1.5*       Intake/Output Summary (Last 24 hours) at 4/27/2022 2146  Last data filed at 4/27/2022 1813  Gross per 24 hour   Intake 300 ml   Output 1100 ml   Net -800 ml       Vancomycin Monitoring:  Trough:  No results for input(s): VANCOTROUGH in the last 72 hours. Random:  No results for input(s): VANCORANDOM in the last 72 hours. Vancomycin Administration Times:  Recent vancomycin administrations      No vancomycin IV orders with administrations found. Assessment:  · Patient is a 80 y.o. female who has been initiated on vancomycin  · Estimated Creatinine Clearance: 22 mL/min (A) (based on SCr of 1.5 mg/dL (H)).   · To dose vancomycin, pharmacy will be utilizing dosing based off of levels because of patient's renal impairment/insufficiency   · Current sCr above baseline up to 1.5 from previous baseline ~1    Plan:  · Vancomycin 1250 mg x 1 dose (19 mg/kg), then dose by levels  · Vancomycin level around 24h post dose to monitor elimination  · Will continue to monitor renal function     Jeimy Gann, PharmD, BCCCP 4/27/2022 9:46 PM  Pharmacy Clinical Specialist, Emergency Medicine  [phone]

## 2022-04-28 NOTE — CONSULTS
[place]  Department of Internal Medicine  Division of Pulmonary, Critical Care and Sleep Medicine  Consult Note    Damon Antonio DO, MPH, [facility], [facility], Awilda Kiran MD, [facility]  [place] DO, [facility]      Patient: Gisele Gutierrez  MRN: [id number]  : 1931    Encounter Time: 9:33 PM     Date of Admission: 2022  2:32 PM    Primary Care Physician: Leonardo Lux DO    Reason for Consultation: Hypoxemia, need for BiPAP, abnormal chest imaging. HISTORY OF PRESENT ILLNESS : Gisele Gutierrez 80 y.o. female was seen in consultation regarding the above chief compliant. Patient seen and examined this afternoon with her daughter and son-in-law present at bedside. They report that she has had progressive shortness of breath for the last 2 weeks. They had also noticed increasing lower extremity edema and she does have a known history of heart failure and does take Lasix at home. Review of the medical record shows that over the night she became progressively more hypoxemic with altered mental status was found to be hypercarbic and was subsequently placed on BiPAP. Currently for me she does report a cough that she does not feel is very productive. She denies any fevers or chills. She reports that she was seen by a pulmonologist 1 time however does not remember who this lung doctor was. She and her family were unable to tell me the location of the office. She denies any known history of sleep apnea. Denies any known history of asthma, COPD, or emphysema. She reports that she did smoke 1 pack a day for approximately 30 years or so but quit back in . She denies any other known inhalational exposures. She had recently been hospitalized at that time appears that she was treated for heart failure.       PAST MEDICAL HISTORY:  has a past medical history of Arthritis, Cataract, Closed subtrochanteric fracture of left femur with delayed healing, Heart murmur, History of cardiovascular stress test, History of transcatheter aortic valve replacement (TAVR), Hyperlipidemia, and Hypertension. SURGICAL HISTORY:  has a past surgical history that includes Revision total hip arthroplasty; Appendectomy; Hysterectomy; hip surgery; Pelvic fracture surgery (2008); joint replacement (5/10/2012); hip surgery (Left, 8/20/15); and Skin cancer excision. SOCIAL HISTORY:  reports that she quit smoking about 34 years ago. She quit after 20.00 years of use. She has never used smokeless tobacco. She reports that she does not drink alcohol and does not use drugs. FAMILY  HISTORY: family history is not on file. MEDICATIONS:    Prior to Admission medications    Medication Sig Start Date End Date Taking?  Authorizing Provider   bumetanide (BUMEX) 1 MG tablet Take 2 mg by mouth daily   Yes Historical Provider, MD   metoprolol succinate (TOPROL XL) 25 MG extended release tablet Take 25 mg by mouth daily She takes half of this pill   Yes Historical Provider, MD   potassium chloride (KLOR-CON) 20 MEQ packet Take 20 mEq by mouth daily   Yes Historical Provider, MD   losartan (COZAAR) 25 MG tablet Take 25 mg by mouth daily   Yes Historical Provider, MD   omeprazole (PRILOSEC) 20 MG delayed release capsule Take 40 mg by mouth daily   Yes Historical Provider, MD   amLODIPine (NORVASC) 5 MG tablet Take 0.5 tablets by mouth daily  Patient taking differently: Take 10 mg by mouth daily  9/13/20   Tahir Valencia DO   furosemide (LASIX) 20 MG tablet Take 1 tablet by mouth daily 9/13/20   Tahir Valencia DO   atorvastatin (LIPITOR) 20 MG tablet Take 20 mg by mouth daily    Historical Provider, MD Jaja CERVANTES [place] [address] Take by mouth 2 times daily    Historical Provider, MD   clopidogrel (PLAVIX) 75 MG tablet Take 1 tablet by mouth daily When cleared with orthopedics 8/23/15   Bryan Reilly DO   levothyroxine (SYNTHROID) 25 MCG tablet Take 25 mcg by mouth Daily    Historical Provider, MD   aspirin 81 MG EC tablet Take 1 tablet by mouth daily. 2/19/14   Demar Day,    rOPINIRole (REQUIP) 1 MG tablet Take 4 mg by mouth 2 times daily     Historical Provider, MD   VITAMIN D, CHOLECALCIFEROL, PO Take 400 mg by mouth daily. 5 pm    Historical Provider, MD       ALLERGIES: Elemental sulfur, Ibuprofen, and Penicillins       REVIEW OF SYSTEMS:  Otherwise negative if not reported or listed below  Constitutional:  No unanticipated weight loss. Positive for poor appetite     No change in sleep pattern or activity. No fevers, chills or rigors. Eyes:    No visual changes or diplopia. No scleral icterus. ENT:    No Headaches, hearing loss or vertigo. No mouth sores or sore throat. Cardiovascular:  No chest discomfort, palpitations. Respiratory:  Positive for increased cough and shortness of breath  Gastrointestinal:  No abdominal pain, appetite loss, blood in stools. No hematemesis  Genitourinary:  No dysuria, trouble voiding or hematuria. No nocturia. Musculoskeletal:   No weakness or joint complaints. Positive for increased lower extremity edema  Integumentary: No rashes or pruritis. Neurological:  No headache, numbness or tingling. Psychiatric:   No effect on mood, memory, mentation, or behavior. No anxiety or depression. Endocrine:   No excessive thirst, fluid intake, or urination. No tremor. Hematologic: No abnormal bruising or bleeding.       OBJECTIVE:     PHYSICAL EXAM:   VITALS:   Vitals:    04/27/22 1420 04/27/22 1552 04/27/22 1900 04/27/22 1921   BP: 126/60   (!) 124/55   Pulse: 77   77   Resp: 16 15 24 22   Temp: 98 °F (36.7 °C)   99.3 °F (37.4 °C)   TempSrc: Temporal   Temporal   SpO2: 98%   95%   Weight:       Height:            Intake/Output Summary (Last 24 hours) at 4/27/2022 2133  Last data filed at 4/27/2022 1813  Gross per 24 hour   Intake 300 ml   Output 1100 ml   Net -800 ml        CONSTITUTIONAL:   A&O x 3, NAD  SKIN: No rash, No suspicious lesions or skin discoloration  HEENT:     EOMI, MMM, No thrush  NECK:    No bruits, no JVD  CV:      Irregularly irregular. 3 out of 6 systolic murmur. PULMONARY:   Rales right mid to base. Clear on left. ABDOMEN:     Soft, non-tender. BS normal. No R/R/G  EXT:    No deformities . No clubbing. 1-2+ lower extremity edema, No venous stasis  PULSE:   Appears equal and palpable.   PSYCHIATRIC:  Seems appropriate, No acute psycosis  MS:    No fractures, No gross weakness  NEUROLOGIC:   The clinical assessment is non-focal     DATA: IMAGING & TESTING:     LABORATORY TESTS:    CBC with Differential:    Lab Results   Component Value Date    WBC 14.7 04/27/2022    RBC 3.84 04/27/2022    HGB 9.9 04/27/2022    HCT 33.2 04/27/2022     04/27/2022    MCV 86.5 04/27/2022    MCH 25.8 04/27/2022    MCHC 29.8 04/27/2022    RDW 18.3 04/27/2022    SEGSPCT 62 02/18/2014    LYMPHOPCT 6.2 04/27/2022    MONOPCT 14.1 04/27/2022    EOSPCT 4 10/11/2010    BASOPCT 0.2 04/27/2022    MONOSABS 2.07 04/27/2022    LYMPHSABS 0.91 04/27/2022    EOSABS 0.04 04/27/2022    BASOSABS 0.03 04/27/2022     CMP:    Lab Results   Component Value Date     04/27/2022    K 4.2 04/27/2022    CL 94 04/27/2022    CO2 31 04/27/2022    BUN 26 04/27/2022    CREATININE 1.5 04/27/2022    GFRAA 39 04/27/2022    LABGLOM 33 04/27/2022    GLUCOSE 162 04/27/2022    GLUCOSE 102 04/06/2012    PROT 6.6 04/27/2022    LABALBU 3.3 04/27/2022    LABALBU 4.7 04/06/2012    CALCIUM 9.0 04/27/2022    BILITOT 0.4 04/27/2022    ALKPHOS 154 04/27/2022    AST 38 04/27/2022    ALT 21 04/27/2022     PT/INR:    Lab Results   Component Value Date    PROTIME 11.0 08/06/2021    INR 0.9 08/06/2021        PRO-BNP:   Lab Results   Component Value Date    PROBNP 4,727 (H) 04/26/2022      ABGs:   Lab Results   Component Value Date    PH 7.238 04/27/2022    PO2 100.4 04/27/2022    PCO2 76.8 04/27/2022     Hemoglobin A1C: No components found for: HGBA1C    IMAGING:  Imaging tests were completed and reviewed and discussed radiology and care team involved and reveals   XR CHEST PORTABLE    Result Date: 2022  EXAMINATION: ONE XRAY VIEW OF THE CHEST 2022 7:48 am COMPARISON: 2022 HISTORY: ORDERING SYSTEM PROVIDED HISTORY: AMS,hypoxia TECHNOLOGIST PROVIDED HISTORY: Reason for exam:->AMS,hypoxia What reading provider will be dictating this exam?->CRC FINDINGS: EKG leads overlie the chest.  Valve replacement hardware again identified. Cardiac silhouette is mildly prominent but unchanged. Worsening consolidation at the base of the right upper lobe. Other patchy parenchymal opacities most notable towards the right base appear unchanged. No pneumothorax or other change. Worsening right upper lobe consolidation. Continued follow-up recommended. XR CHEST PORTABLE    Result Date: 2022  EXAMINATION: ONE XRAY VIEW OF THE CHEST 2022 1:41 pm COMPARISON: 2020 HISTORY: ORDERING SYSTEM PROVIDED HISTORY: SOB, CHF TECHNOLOGIST PROVIDED HISTORY: Reason for exam:->SOB, CHF What reading provider will be dictating this exam?->CRC FINDINGS: Multifocal bilateral pneumonia, right greater than left. TAVR present. Heart is enlarged. Normal pulmonary vascularity. Multifocal bilateral pneumonia, right greater than left. Cardiomegaly. US DUP LOWER EXTREMITIES BILATERAL VENOUS    Result Date: 2022  Patient MRN:  [id number] : 1931 Age: 80 years Gender: Female Order Date:  2022 9:09 PM EXAM: US DUP LOWER EXTREMITIES BILATERAL VENOUS NUMBER OF IMAGES:  40 INDICATION:  r/o dvt r/o dvt What reading provider will be dictating this exam?->[facility] COMPARISON: None Within the visualized vessels, there is no evidence for deep venous thrombosis There is good compressibility, there is good augmentation, there is good color flow.      Within the visualized vessels there is no evidence for deep venous thrombosis       Assessment: 1. Pneumonia  2. Acute hypoxemic respiratory failure  3. Hypercarbic respiratory failure  4. CKD  5. Protein calorie malnutrition  6. Leukocytosis  7. Continue BiPAP at at bedtime and with naps. Plan:   1. Sputum culture ordered  2. CT of chest reviewed. While official read is pending it does appear the patient has developed a significant pneumonia on the right side with air bronchograms and consolidation. There is an adjacent pleural effusion. 3. She does have a significant allergy to penicillin I have asked the pharmacy to give a one-time dose of vancomycin. MRSA nasal screen is ordered as she is certainly at risk for a healthcare associated pneumonia as she was recently hospitalized. 4. Continue diuretic as per primary. 5. Repeat procalcitonin in AM.  Ideally would like to trend procalcitonin to monitor antibiotic therapy. 6. Flutter valve ordered. 7. Wean FiO2 as tolerated  8.  DuoNebs every 4 hours while awake    Thank you for the consult pulmonary will continue to follow    Cristopher Hdz, DO   Pulmonary, Critical Care and Sleep Medicine

## 2022-04-28 NOTE — PLAN OF CARE
Problem: Discharge Planning  Goal: Discharge to home or other facility with appropriate resources  Outcome: Progressing     Problem: Pain  Goal: Verbalizes/displays adequate comfort level or baseline comfort level  4/28/2022 1247 by Kennedi Ortiz RN  Outcome: Progressing

## 2022-04-28 NOTE — CARE COORDINATION
4/28 Update CM note. Pulm started patient on Vancomycin IV (dosed by pharmacy) and Merrem IV Q12H. Lasix 20 MG IV BID continues per cardio. ECHO completed this AM, pending read. CT Chest yesterday showed large consolidation suggestive of PNA in RUL and multiple scattered bilateral ill-defined opacities along with right and left pleural effusions. Patient off BiPAP and is on 10L HFNC. Patient not stable enough to work with PT/OT. Will need evals when appropriate to determine LOC on discharge. Patient's daughter was provided with [facility] RONALD list yesterday to begin reviewing if patient would require RONALD on discharge.     Franc Alvarez, BSN, RN  [facility] Case Management   Cell: [phone]

## 2022-04-28 NOTE — PROGRESS NOTES
Pharmacy Consultation Note  (Antibiotic Dosing and Monitoring)    Initial consult date: 4/27/22  Consulting physician/provider: Dr. China Pelaez  Drug: Vancomycin  Indication: PNA    Age/  Gender Height Weight IBW  Allergy Information   89 y.o./female 4' 11\" (149.9 cm) 145 lb 4.5 oz (65.9 kg)     Ideal body weight: 48.8 kg (107 lb 8.4 oz)  Adjusted ideal body weight: 54.6 kg (120 lb 5.3 oz)   Elemental sulfur, Ibuprofen, and Penicillins      Renal Function:  Recent Labs     04/27/22  0600 04/27/22  0635 04/28/22  0650   BUN 26* 26* 25*   CREATININE 1.5* 1.5* 1.4*       Intake/Output Summary (Last 24 hours) at 4/28/2022 1636  Last data filed at 4/28/2022 1218  Gross per 24 hour   Intake 420 ml   Output 950 ml   Net -530 ml       Vancomycin Monitoring:  Trough:  No results for input(s): VANCOTROUGH in the last 72 hours. Random:  No results for input(s): VANCORANDOM in the last 72 hours. Vancomycin Administration Times:  Recent vancomycin administrations                   vancomycin (VANCOCIN) 1,250 mg in dextrose 5 % 250 mL IVPB (mg) 1,250 mg New Bag 04/27/22 2231              Assessment:  · Patient is a 80 y.o. female who has been initiated on vancomycin  Estimated Creatinine Clearance: 23 mL/min (A) (based on SCr of 1.4 mg/dL (H)).   · To dose vancomycin, pharmacy will be utilizing dosing based off of levels because of patient's renal impairment/insufficiency   · 4/27: Current sCr above baseline up to 1.5 from previous baseline ~1  · 4/28: WBC, Scr 1.4, MRSA nares swab pending    Plan:  · No vancomycin today  · Will check vancomycin level with AM labs 4/29  · Will continue to monitor renal function     Janell Wilde PharmD   Pharmacy Resident   Phone: [phone]  4/28/2022 4:38 PM

## 2022-04-28 NOTE — PROGRESS NOTES
[place]  Department of Pulmonary, Critical Care and Sleep Medicine  [address] [facility]  Department of Internal Medicine  Progress Note    SUBJECTIVE:    Patient seen and examined after lunch. On BiPAP at time of my examination. The patient was without any acute complaints. It does appear that her appetite remains poor. OBJECTIVE:  Vitals:    04/28/22 1250 04/28/22 1251 04/28/22 1448 04/28/22 1457   BP:    (!) 123/50   Pulse:    72   Resp: 23 23  20   Temp:   99 °F (37.2 °C) 99 °F (37.2 °C)   TempSrc:    Temporal   SpO2:  93%  94%   Weight:       Height:         Constitutional: Alert, on BiPAP  EENT: EOMI YAEL. Mucous membranes dry  Neck: No thyromegaly. No elevated JVP. Trachea was midline. Respiratory: No use of accessory muscles at this time. Breath sounds diminished in bases otherwise clear  Cardiovascular: Regular, No murmur. No rubs. Pulses:  Equal bilaterally. Abdomen: Soft without organomegaly. No rebound, rigidity. No guarding. Lymphatic: No lymphadenopathy. Musculoskeletal: Without weakness or gross deficits  Extremities:  No lower extremity edema. Reflexes appear adequate. Skin:  Warm and dry. No skin rashes. Neurological/Psychiatric: No acute psychosis. Cranial nerves are intact. DATA:    Monitor Strips:  Reviewed & discusses with technical team. No changes noted. RADIOLOGY:  Films were read/reviewed/discussed with radiology shows no new imaging today.     CBC with Differential:    Lab Results   Component Value Date    WBC 13.5 04/28/2022    RBC 3.63 04/28/2022    HGB 9.3 04/28/2022    HCT 31.6 04/28/2022     04/28/2022    MCV 87.1 04/28/2022    MCH 25.6 04/28/2022    MCHC 29.4 04/28/2022    RDW 18.3 04/28/2022    SEGSPCT 62 02/18/2014    LYMPHOPCT 6.2 04/27/2022    MONOPCT 14.1 04/27/2022    EOSPCT 4 10/11/2010    BASOPCT 0.2 04/27/2022    MONOSABS 2.07 04/27/2022    LYMPHSABS 0.91 04/27/2022    EOSABS 0.04 04/27/2022    BASOSABS 0.03 04/27/2022     CMP:    Lab Results   Component Value Date     04/28/2022    K 4.3 04/28/2022    CL 94 04/28/2022    CO2 32 04/28/2022    BUN 25 04/28/2022    CREATININE 1.4 04/28/2022    GFRAA 43 04/28/2022    LABGLOM 35 04/28/2022    GLUCOSE 116 04/28/2022    GLUCOSE 102 04/06/2012    PROT 6.6 04/27/2022    LABALBU 3.3 04/27/2022    LABALBU 4.7 04/06/2012    CALCIUM 8.9 04/28/2022    BILITOT 0.4 04/27/2022    ALKPHOS 154 04/27/2022    AST 38 04/27/2022    ALT 21 04/27/2022     ABG:    Lab Results   Component Value Date    PH 7.349 04/28/2022    PCO2 61.9 04/28/2022    PO2 80.6 04/28/2022    HCO3 33.3 04/28/2022    BE 6.4 04/28/2022    O2SAT 95.3 04/28/2022       Assessment:   1. Pneumonia  2. Acute hypoxemic respiratory failure  3. Hypercarbic respiratory failure  4. CKD  5. Protein calorie malnutrition  6. Leukocytosis-improving  7. Continue BiPAP at at bedtime and with naps.        Plan:   1. Sputum culture ordered gram stain with gram-positive cocci. 2. CT of chest reviewed. While official read is pending it does appear the patient has developed a significant pneumonia on the right side with air bronchograms and consolidation. There is an adjacent pleural effusion. 3. MRSA nasal screen pending. Continue vancomycin and meropenem while awaiting culture results. 4. Continue diuretic as per primary. 5. Flutter valve ordered. 6. Wean FiO2 as tolerated  7. DuoNebs every 4 hours while awake  8. Continue BiPAP at at bedtime and with naps. Addendum notified this afternoon by patient's nurse of updated ABG results. BiPAP adjusted to 14/8.   Repeat chest x-ray pending.     Thank you for the consult pulmonary will continue to follow     [address], DO   Pulmonary, Critical Care and Sleep Medicine

## 2022-04-28 NOTE — PROGRESS NOTES
INPATIENT CARDIOLOGY FOLLOW-UP    Name: Amanda Schneider    Age: 80 y.o. Date of Admission: 4/26/2022  2:32 PM    Date of Service: 4/28/2022    Primary Cardiologist: New to me from this admission    Chief Complaint: Follow-up for acute hypoxic and hypercapnic Respiratory failure. Acute decompensated heart failure. Interim History:  No new overnight cardiac complaints. Currently with no complaints of CP, SOB, palpitations, dizziness, or lightheadedness. SR on telemetry. No significant arrhythmias seen. Was on 12 L of oxygen via nasal cannula with worsening oxygen saturation. Now back on BiPAP therapy.     Review of Systems:   Negative except as described above    Problem List:  Patient Active Problem List   Diagnosis    Hip pain    Aseptic loosening of prosthetic hip ([facility]    DJD (degenerative joint disease) of knee    Knee pain    HTN (hypertension)    HLD (hyperlipidemia)    Closed fracture of left femur ([facility]    Closed subtrochanteric fracture of left femur with delayed healing    Near syncope    Chronic congestive heart failure ([facility]    Acute hypoxemic respiratory failure (HCC)    History of transcatheter aortic valve replacement (TAVR)       Current Medications:    Current Facility-Administered Medications:     meropenem (MERREM) 1,000 mg in sodium chloride 0.9 % 100 mL IVPB (mini-bag), 1,000 mg, IntraVENous, Q12H, Janeth Garrett DO, Last Rate: 33.3 mL/hr at 04/28/22 1218, 1,000 mg at 04/28/22 1218    guaiFENesin-dextromethorphan (ROBITUSSIN DM) 100-10 MG/5ML syrup 5 mL, 5 mL, Oral, Q4H PRN, Sinda Bolt, DO    benzonatate (TESSALON) capsule 100 mg, 100 mg, Oral, TID PRN, Sinda Bolt, DO, 100 mg at 04/27/22 0456    losartan (COZAAR) tablet 25 mg, 25 mg, Oral, Daily, Sinda Bolt, DO, 25 mg at 04/28/22 5967    metoprolol succinate (TOPROL XL) extended release tablet 25 mg, 25 mg, Oral, Daily, Sinda Bolt, DO, 25 mg at 04/28/22 0976    pantoprazole (PROTONIX) tablet 40 mg, 40 mg, Oral, QAM AC, Parul Ligas, DO, 40 mg at 04/28/22 0538    potassium bicarb-citric acid (EFFER-K) effervescent tablet 20 mEq, 20 mEq, Oral, Daily, Parul Ligas, DO, 20 mEq at 04/28/22 6202    perflutren lipid microspheres (DEFINITY) injection 1.65 mg, 1.5 mL, IntraVENous, ONCE PRN, Alfonso Wilkins PA-C    hydrALAZINE (APRESOLINE) injection 10 mg, 10 mg, IntraVENous, Q6H PRN, [facility] SHAHBAZ Montalvo    ipratropium-albuterol (DUONEB) nebulizer solution 1 ampule, 1 ampule, Inhalation, Q4H WA, Shazia Barrientos DO, 1 ampule at 04/28/22 1248    vancomycin (VANCOCIN) intermittent dosing (placeholder), , Other, RX Placeholder, Shazia Hernández DO    amLODIPine (NORVASC) tablet 10 mg, 10 mg, Oral, Daily, Nathalie Caba APRN - CNP, 10 mg at 04/28/22 0956    aspirin EC tablet 81 mg, 81 mg, Oral, Daily, Christinat Rosales, APRN - CNP, 81 mg at 04/28/22 9477    atorvastatin (LIPITOR) tablet 20 mg, 20 mg, Oral, Daily, Margbont Rosales, APRN - CNP, 20 mg at 04/28/22 7566    [Held by provider] cloNIDine (CATAPRES) tablet 0.1 mg, 0.1 mg, Oral, BID, Nathalie Caba APRN - CNP, 0.1 mg at 04/26/22 2316    clopidogrel (PLAVIX) tablet 75 mg, 75 mg, Oral, Daily, Christinat Rosales, APRN - CNP, 75 mg at 04/28/22 1668    polycarbophil (FIBERCON) tablet 625 mg, 625 mg, Oral, BID, Margbont Rosales, APRN - CNP, 625 mg at 04/28/22 2860    levothyroxine (SYNTHROID) tablet 25 mcg, 25 mcg, Oral, Daily, Christinat Rosales, APRN - CNP, 25 mcg at 04/28/22 0538    rOPINIRole (REQUIP) tablet 4 mg, 4 mg, Oral, Nightly, MICKI Vidal CNP, 4 mg at 04/27/22 2146    vitamin D3 (CHOLECALCIFEROL) tablet 400 Units, 400 Units, Oral, Daily, MICKI Vidal CNP, 400 Units at 04/27/22 1745    sodium chloride flush 0.9 % injection 5-40 mL, 5-40 mL, IntraVENous, 2 times per day, MICKI Vidal CNP, 10 mL at 04/28/22 0927    sodium chloride flush 0.9 % injection 5-40 mL, 5-40 mL, IntraVENous, PRN, Nathalie Caba, APRN - CNP    0.9 % sodium chloride infusion, , IntraVENous, PRN, Carpio Snide, APRN - CNP    ondansetron (ZOFRAN-ODT) disintegrating tablet 4 mg, 4 mg, Oral, Q8H PRN **OR** ondansetron (ZOFRAN) injection 4 mg, 4 mg, IntraVENous, Q6H PRN, Carpio Snide, APRN - CNP    polyethylene glycol (GLYCOLAX) packet 17 g, 17 g, Oral, Daily PRN, Carpio Snide, APRN - CNP    acetaminophen (TYLENOL) tablet 650 mg, 650 mg, Oral, Q6H PRN **OR** acetaminophen (TYLENOL) suppository 650 mg, 650 mg, Rectal, Q6H PRN, Carpio Snide, APRN - CNP    heparin (porcine) injection 5,000 Units, 5,000 Units, SubCUTAneous, 3 times per day, Carpio Snide, APRN - CNP, 5,000 Units at 04/28/22 1409    furosemide (LASIX) injection 20 mg, 20 mg, IntraVENous, BID, Janeth Garrett DO, 20 mg at 04/28/22 1006    melatonin tablet 3 mg, 3 mg, Oral, Nightly PRN, Caren Gosselin, DO    Physical Exam:  BP (!) 123/50   Pulse 72   Temp 99 °F (37.2 °C) (Temporal)   Resp 20   Ht 4' 11\" (1.499 m)   Wt 139 lb 8.8 oz (63.3 kg)   SpO2 94%   BMI 28.19 kg/m²   Wt Readings from Last 3 Encounters:   04/28/22 139 lb 8.8 oz (63.3 kg)   09/12/20 133 lb 0.1 oz (60.3 kg)   05/25/16 134 lb (60.8 kg)     Appearance: Awake, alert, no acute respiratory distress  Skin: Intact, no rash  Head: Normocephalic, atraumatic  Eyes: EOMI, no conjunctival erythema  ENMT: No pharyngeal erythema, MMM, no rhinorrhea  Neck: Supple, JVP elevated at 12 cm, no carotid bruits  Lungs: Clear to auscultation bilaterally. No wheezes, rales, or rhonchi.   Cardiac: PMI nondisplaced, Regular rhythm with a normal rate, S1 & S2 normal, no murmurs  Abdomen: Soft, nontender, +bowel sounds  Extremities: Moves all extremities x 4, no lower extremity edema  Neurologic: No focal motor deficits apparent, normal mood and affect  Peripheral Pulses: Intact posterior tibial pulses bilaterally    Intake/Output:    Intake/Output Summary (Last 24 hours) at 4/28/2022 1600  Last data filed at 4/28/2022 1218  Gross per 24 hour   Intake 420 ml   Output 950 ml   Net -530 ml     I/O this shift:  In: 240 [P.O.:240]  Out: 600 [Urine:600]    Laboratory Tests:  Recent Labs     04/27/22  0600 04/27/22  0600 04/27/22  0627 04/27/22  0635 04/28/22  0650     --   --  136 137   K 3.9   < > 4.14 4.2 4.3   CL 94*  --   --  94* 94*   CO2 34*  --   --  31* 32*   BUN 26*  --   --  26* 25*   CREATININE 1.5*  --   --  1.5* 1.4*   GLUCOSE 146*  --   --  162* 116*   CALCIUM 8.5*  --   --  9.0 8.9    < > = values in this interval not displayed.      Lab Results   Component Value Date    MG 1.9 04/28/2022     Recent Labs     04/26/22  1454 04/27/22  0635   ALKPHOS 119* 154*   ALT 22 21   AST 34* 38*   PROT 6.9 6.6   BILITOT 0.4 0.4   LABALBU 3.6 3.3*     Recent Labs     04/26/22  1454 04/27/22  0635 04/28/22  1115   WBC 13.6* 14.7* 13.5*   RBC 3.94 3.84 3.63   HGB 10.2* 9.9* 9.3*   HCT 33.0* 33.2* 31.6*   MCV 83.8 86.5 87.1   MCH 25.9* 25.8* 25.6*   MCHC 30.9* 29.8* 29.4*   RDW 18.3* 18.3* 18.3*    207 192   MPV 10.6 10.8 10.7     Lab Results   Component Value Date    CKTOTAL 115 02/14/2014    CKMB 3.1 02/14/2014    TROPONINI <0.01 09/11/2020    TROPONINI <0.01 02/14/2014     Lab Results   Component Value Date    INR 0.9 08/06/2021    INR 1.0 08/20/2015    INR 1.1 02/18/2014    PROTIME 11.0 08/06/2021    PROTIME 11.3 08/20/2015    PROTIME 12.1 02/18/2014     Lab Results   Component Value Date    TSH 0.822 04/26/2022     Lab Results   Component Value Date    LABA1C 5.9 02/15/2014     No results found for: EAG  Lab Results   Component Value Date    CHOL 131 04/27/2022    CHOL 187 04/05/2021    CHOL 206 (H) 11/12/2020     Lab Results   Component Value Date    TRIG 66 04/27/2022    TRIG 85 04/05/2021    TRIG 112 11/12/2020     Lab Results   Component Value Date    HDL 64 04/27/2022    HDL 95 04/19/2022    HDL 88 04/05/2021     Lab Results   Component Value Date    LDLCALC 54 04/27/2022    [address] 04/19/2022 LDLCALC 82 04/05/2021     Lab Results   Component Value Date    LABVLDL 13 04/27/2022    LABVLDL 17 04/19/2022    LABVLDL 17 04/05/2021     No results found for: CHOLHDLRATIO  Recent Labs     04/26/22  1454   PROBNP 4,727*       Cardiac Tests:    1. HFpEF:  ? Echocardiogram (2/15/2014): All cardiac chamber sizes including aortic root size are within normallimits. The left ventricle shows normal wall thicknesses.  Diastolic filling dysfunction stage 1 is seen.  Systolic function is well preserved with ejection fraction estimated at 66%.  No focal wall motion abnormality is seen. The mitral valve shows thickening of the leaflets.  No significant mitral stenosis.  Mitral valve area 3.3 cm2 by pressure half-time with a maximal gradient of 4.6 mmHg.  There is no mitral stenosis.  There is 1+ to at most 2+ mitral regurgitation. Aortic valve appears structurally normal, is sclerotic.  Maximal gradient 17 mmHg.  Mean gradient 9.9.  There is mild aortic stenosis with trace aortic insufficiency present.  Aortic valve area estimated at 1.6 cm2. There is no pulmonic stenosis or insufficiency.  There is 2+ tricuspid regurgitation with pulmonary hypertension at 40 mmHg. There is no significant pericardial effusion or any definite intracavitary mass, or thrombus, or shunt identified on this study. ? Echocardiogram (9/12/2020):  Summary: Ejection fraction is visually estimated at 61%. Overall ejection fraction is normal. There is doppler evidence of stage I diastolic dysfunction. Physiologic and/or trace mitral regurgitation is present. Mild mitral annular calcification. The aortic valve appears moderately sclerotic. Mild aortic regurgitation is noted. Moderate aortic stenosis. Mild tricuspid regurgitation. ?  Lexiscan stress test (2/17/2014): LVEF equals 73%.  Gated wall motion examination was performed in conjunction with cardiac SPECT imaging.  Left ventricular wall motion is fairly uniform with no demonstration of focal or global hypokinesia. [place] is no demonstration of left ventricular dilatation.  Impression: No fixed or reversible perfusion abnormality involving the left ventricle induced by Lexiscan stress. 2. Aortic stenosis with TAVR ( 2021): Awaiting records at this time. 3. TTE 4/28/22:   Normal left ventricular chamber size. Normal left ventricular systolic function. Visually estimated LVEF is 55-60 %. Mild concentric LVH. No wall motion abnormalities. Grade II diastolic dysfunction with elevated LA pressure. Mildly dilated right ventricle with preserved function. The left atrium is moderately dilated. Normal right atrial size. Mild pulmonary hypertension with a PASP of 42 mm Hg. There is a bioprosthetic aortic valve in place that appears well seated   with acceptable function and gradients. Trace regurgitation present. No comparison study available.     I have personally participated in the history, exam, diagnosis with my Advanced Practice Nurse/Physician Assistant on the date of service. I discussed pertinent history, exam findings, and treatment plan with our physician extender.     ASSESSMENT:  1. Acute hypercapnic/hypoxic respiratory failure. 2. Possible component of acute on chronic HFpEF:  ? Echo 9/12/2020, EF 61%, evidence of stage I diastolic dysfunction, moderately sclerosed aortic valve, mild AR, moderate AS, mild TR.   ? Appears mildly hypervolemic on exam, proBNP 4700  3. Right upper lobe pneumonia, being managed by primary care team, now on ATB.   Leukocytosis and elevated procalcitonin. 4. Altered mental status, most likely secondary to hypercapnia.  Patient now on BiPAP and being managed by primary care team.  5. CARLOS/on likely CKD.  Cr baseline appears around 1.0 to 1.1, now 1.4 --> 1.5.  6. AS s/p reported TAVR ( 2021), awaiting records. 7. Hypertension, controlled  8. HLD on Lipitor  9. Hypothyroidism on HRT  10. DNR-CCA code status        RECOMMENDATIONS:  1.  Complete echocardiogram to evaluate LVEF and VHD (TAVR)  2. Continue IV diuresis with Lasix 20 mg twice daily  3. Hold clonidine at this time, IV hydralazine as needed for SBP >160  4. Strict I&O's with daily weights, monitor renal function. 5. Continue home cardiac medications including Toprol-XL, potassium, Norvasc, Lipitor, Plavix, Losartan, ASA.    6. Continue to monitor renal function and consider discontinuation of losartan should there be worsening azotemia. 7. Awaiting records from [address] for TAVR procedure. 8. CHF clinic referral for close monitoring post discharge. 9. Pulmonary medicine on board for management of pulmonary issues. Appreciate recommendations. Respiratory failure is predominantly being driven by her pneumonia. Karin Brewer MD, [facility] Cardiology    NOTE: This report was transcribed using voice recognition software. Every effort was made to ensure accuracy; however, inadvertent computerized transcription errors may be present.

## 2022-04-28 NOTE — PROGRESS NOTES
Hospitalist Progress Note      Synopsis: Patient admitted for shortness of breath  : Ms. Staci Mercer, a 80 y.o. year old female with a recent TAVR at [facility] in 2021, who was recently seen by her primary care provider with complaint of shortness of breath, and lower extremity edema.  Her primary care provider recommended her come to the ER for further evaluation and diuresis.  Patient lives with her daughter and up until about 3 weeks ago was very active and able to ambulate well around her home with a walker.  Patient's daughter states over the last 3 weeks her shortness of breath has gotten progressively worse.    : RRT was called due to change in mental status, patient's CODE STATUS was changed to DNR CCA. Pulmonology consulted for BiPAP dependence. CT of the chest shows large consolidation suggestive of pneumonia in the right upper lobe posteriorly. : One-time dose of Vanco ordered IV, IV meropenem added. Called to bedside over patient daughter reporting her mother \"shaking\". Blood gas ordered, and chest x-ray for tomorrow. Hospital day 2     Subjective:  Patient seen and examined at bedside on BiPAP, pleasantly confused but able to answer questions appropriately. Daughter present at bedside all questions answered,  patient and daughter agreeable with plan of care. Stable overnight. No issues reported. Patient seen and examined  Records reviewed. Temp (24hrs), Av.4 °F (36.9 °C), Min:98 °F (36.7 °C), Max:99.3 °F (37.4 °C)    DIET: ADULT DIET; Regular; Low Sodium (2 gm)  CODE: Limited    Intake/Output Summary (Last 24 hours) at 2022 1024  Last data filed at 2022 0905  Gross per 24 hour   Intake 540 ml   Output 900 ml   Net -360 ml       Review of Systems: All bolded are positive; please see HPI  General:  Fever, chills, diaphoresis, fatigue, malaise, night sweats, weight loss  Psychological:  Anxiety, disorientation, hallucinations.   ENT:  Epistaxis, headaches, vertigo, visual changes. Cardiovascular:  Chest pain, irregular heartbeats, palpitations, paroxysmal nocturnal dyspnea. Respiratory:  Shortness of breath, coughing, sputum production, hemoptysis, wheezing, orthopnea. Gastrointestinal:  Nausea, vomiting, diarrhea, heartburn, constipation, abdominal pain, hematemesis, hematochezia, melena, acholic stools  Genito-Urinary:  Dysuria, urgency, frequency, hematuria  Musculoskeletal:  Joint pain, joint stiffness, joint swelling, muscle pain  Neurology:  Headache, focal neurological deficits, weakness, numbness, paresthesia  Derm:  Rashes, ulcers, excoriations, bruising  Extremities:  Decreased ROM, peripheral edema, mottling    Objective:    BP (!) 168/66   Pulse 84   Temp 98.3 °F (36.8 °C) (Temporal)   Resp 24   Ht 4' 11\" (1.499 m)   Wt 139 lb 8.8 oz (63.3 kg)   SpO2 93%   BMI 28.19 kg/m²     General appearance: Elderly female, visibly uncomfortable on BiPAP, appears stated age and cooperative. Respiratory: Rhonchi and wheezes in upper airway, crackles in lower lung fields to auscultation bilaterally. Tachypneic and labored respiratory effort. Cardiovascular:  RRR. S1, S2 without MRG. PV: Pulses palpable. 3 + pitting edema in bilateral lower extremities  Abdomen: Soft, non-tender, non-distended. +BS  Musculoskeletal: No obvious deformities. Skin: Normal skin color pale. Neurologic:  Grossly non-focal. Awake, alert to voice, not following commands.    Psychiatric: Alert and oriented x1, thought content inappropriate judgement    Medications:  REVIEWED DAILY    Infusion Medications    sodium chloride       Scheduled Medications    losartan  25 mg Oral Daily    metoprolol succinate  25 mg Oral Daily    pantoprazole  40 mg Oral QAM AC    potassium bicarb-citric acid  20 mEq Oral Daily    ipratropium-albuterol  1 ampule Inhalation Q4H WA    levofloxacin  750 mg IntraVENous Q48H    vancomycin (VANCOCIN) intermittent dosing (placeholder) Other RX Placeholder    amLODIPine  10 mg Oral Daily    aspirin  81 mg Oral Daily    atorvastatin  20 mg Oral Daily    [Held by provider] cloNIDine  0.1 mg Oral BID    clopidogrel  75 mg Oral Daily    polycarbophil  625 mg Oral BID    levothyroxine  25 mcg Oral Daily    rOPINIRole  4 mg Oral Nightly    vitamin D3  400 Units Oral Daily    sodium chloride flush  5-40 mL IntraVENous 2 times per day    heparin (porcine)  5,000 Units SubCUTAneous 3 times per day    furosemide  20 mg IntraVENous BID     PRN Meds: guaiFENesin-dextromethorphan, benzonatate, perflutren lipid microspheres, hydrALAZINE, sodium chloride flush, sodium chloride, ondansetron **OR** ondansetron, polyethylene glycol, acetaminophen **OR** acetaminophen, melatonin    Labs:     Recent Labs     04/26/22  1454 04/27/22  0635   WBC 13.6* 14.7*   HGB 10.2* 9.9*   HCT 33.0* 33.2*    207       Recent Labs     04/27/22  0600 04/27/22  0600 04/27/22  0627 04/27/22  0635 04/28/22  0650     --   --  136 137   K 3.9   < > 4.14 4.2 4.3   CL 94*  --   --  94* 94*   CO2 34*  --   --  31* 32*   BUN 26*  --   --  26* 25*   CREATININE 1.5*  --   --  1.5* 1.4*   CALCIUM 8.5*  --   --  9.0 8.9    < > = values in this interval not displayed. Recent Labs     04/26/22  1454 04/27/22  0635   PROT 6.9 6.6   ALKPHOS 119* 154*   ALT 22 21   AST 34* 38*   BILITOT 0.4 0.4       No results for input(s): INR in the last 72 hours. No results for input(s): [place] in the last 72 hours.     Chronic labs:    Lab Results   Component Value Date    CHOL 131 04/27/2022    TRIG 66 04/27/2022    HDL 64 04/27/2022    LDLCALC 54 04/27/2022    TSH 0.822 04/26/2022    INR 0.9 08/06/2021    LABA1C 5.9 02/15/2014       Radiology: REVIEWED DAILY    Assessment:  · Acute on chronic congestive heart failure  · Acute respiratory failure with hypoxia  · Pneumonia  · Elevated troponin at 44  · Leukocytosis 13.5  · CARLOS creatinine 1.5, 1.4 today  · Hypertension  · Hyperlipidemia  · Heart murmur  · Arthritis  · Hx of TAVR in oct of 2021    Plan:  · CODE STATUS changed to limited, no chest compressions, no ACLS medications, no intubation. · LQK 2674 LF presentation  Echocardiogram,  Normal left ventricular chamber size. Normal left ventricular systolic function. Visually estimated LVEF is 55-60 %. Mild concentric LVH. No wall motion abnormalities. Grade II diastolic dysfunction with elevated LA pressure. Mildly dilated right ventricle with preserved function. The left atrium is moderately dilated. Normal right atrial size. Mild pulmonary hypertension with a PASP of 42 mm Hg  · Toprol XL 25 mg  · Daily weights  · BiPAP   · Pulmonology consulted  · Procalcitonin 0.35 yesterday, 0.26 today  · Respiratory culture shows abundant polymorphonuclear leukocytes and moderate Gram positive cocci in pairs  · One-time dose of vancomycin IV  · Start meropenem IV  · Stat ABG ordered  · Chest x-ray for the a.m. · Strict intake and output  · IV Lasix 20 mg BID  · Monitor electrolytes, especially K  · cardiology consult  · Continue home medications Norvasc 2.5 mg, aspirin 81 mg, Lipitor 20 mg, Catapres 0.1 mg, Plavix 75 mg, Synthroid 25 mg,         DVT Prophylaxis [] Lovenox  [x]  Heparin [] DOAC [] PCDs [] Ambulation    GI Prophylaxis [] PPI  [] H2 Blocker   [] Carafate  [x] Diet/Tube Feeds   Level of care [] Med/Surg  [x] Intermediate  []  ICU   Diet ADULT DIET; Regular; Low Sodium (2 gm)    Family contact []  N/A    [x] At bedside  [] Phone call   Disposition Patient requires continued admission evaluation of shortness of breath, possible pneumonia, CHF exacerbation   MDM [] Low    [x] Moderate  []   High       Discharge Plan:  To SNF or possibly with home health care when clinically stable    +++++++++++++++++++++++++++++++++++++++++++++++++  Frankey Duran, C/ [address], [place]  +++++++++++++++++++++++++++++++++++++++++++++++++  NOTE: This report was transcribed using voice recognition software. Every effort was made to ensure accuracy; however, inadvertent computerized transcription errors may be present.

## 2022-04-29 ENCOUNTER — APPOINTMENT (OUTPATIENT)
Dept: GENERAL RADIOLOGY | Age: 87
DRG: 193 | End: 2022-04-29
Payer: MEDICARE

## 2022-04-29 LAB
ANION GAP SERPL CALCULATED.3IONS-SCNC: 10 MMOL/L (ref 7–16)
BUN BLDV-MCNC: 26 MG/DL (ref 6–23)
CALCIUM SERPL-MCNC: 9.1 MG/DL (ref 8.6–10.2)
CHLORIDE BLD-SCNC: 95 MMOL/L (ref 98–107)
CO2: 34 MMOL/L (ref 22–29)
CREAT SERPL-MCNC: 1.4 MG/DL (ref 0.5–1)
CULTURE, RESPIRATORY: NORMAL
GFR AFRICAN AMERICAN: 43
GFR NON-AFRICAN AMERICAN: 35 ML/MIN/1.73
GLUCOSE BLD-MCNC: 96 MG/DL (ref 74–99)
HCT VFR BLD CALC: 31.5 % (ref 34–48)
HEMOGLOBIN: 9.3 G/DL (ref 11.5–15.5)
MAGNESIUM: 1.9 MG/DL (ref 1.6–2.6)
MCH RBC QN AUTO: 25.8 PG (ref 26–35)
MCHC RBC AUTO-ENTMCNC: 29.5 % (ref 32–34.5)
MCV RBC AUTO: 87.5 FL (ref 80–99.9)
MRSA CULTURE ONLY: NORMAL
PDW BLD-RTO: 18.2 FL (ref 11.5–15)
PLATELET # BLD: 194 E9/L (ref 130–450)
PMV BLD AUTO: 10.8 FL (ref 7–12)
POTASSIUM SERPL-SCNC: 4.2 MMOL/L (ref 3.5–5)
PRO-BNP: 5409 PG/ML (ref 0–450)
RBC # BLD: 3.6 E12/L (ref 3.5–5.5)
SMEAR, RESPIRATORY: NORMAL
SODIUM BLD-SCNC: 139 MMOL/L (ref 132–146)
VANCOMYCIN RANDOM: 8 MCG/ML (ref 5–40)
WBC # BLD: 11.4 E9/L (ref 4.5–11.5)

## 2022-04-29 PROCEDURE — 71045 X-RAY EXAM CHEST 1 VIEW: CPT

## 2022-04-29 PROCEDURE — 36415 COLL VENOUS BLD VENIPUNCTURE: CPT

## 2022-04-29 PROCEDURE — 2140000000 HC CCU INTERMEDIATE R&B

## 2022-04-29 PROCEDURE — 2580000003 HC RX 258: Performed by: INTERNAL MEDICINE

## 2022-04-29 PROCEDURE — 6360000002 HC RX W HCPCS: Performed by: INTERNAL MEDICINE

## 2022-04-29 PROCEDURE — 94660 CPAP INITIATION&MGMT: CPT

## 2022-04-29 PROCEDURE — 99233 SBSQ HOSP IP/OBS HIGH 50: CPT | Performed by: INTERNAL MEDICINE

## 2022-04-29 PROCEDURE — 6370000000 HC RX 637 (ALT 250 FOR IP): Performed by: INTERNAL MEDICINE

## 2022-04-29 PROCEDURE — 80202 ASSAY OF VANCOMYCIN: CPT

## 2022-04-29 PROCEDURE — 6370000000 HC RX 637 (ALT 250 FOR IP)

## 2022-04-29 PROCEDURE — 2580000003 HC RX 258

## 2022-04-29 PROCEDURE — 83880 ASSAY OF NATRIURETIC PEPTIDE: CPT

## 2022-04-29 PROCEDURE — 80048 BASIC METABOLIC PNL TOTAL CA: CPT

## 2022-04-29 PROCEDURE — 85027 COMPLETE CBC AUTOMATED: CPT

## 2022-04-29 PROCEDURE — 6360000002 HC RX W HCPCS

## 2022-04-29 PROCEDURE — 6370000000 HC RX 637 (ALT 250 FOR IP): Performed by: FAMILY MEDICINE

## 2022-04-29 PROCEDURE — 94640 AIRWAY INHALATION TREATMENT: CPT

## 2022-04-29 PROCEDURE — 83735 ASSAY OF MAGNESIUM: CPT

## 2022-04-29 RX ORDER — FUROSEMIDE 10 MG/ML
20 INJECTION INTRAMUSCULAR; INTRAVENOUS 3 TIMES DAILY
Status: DISCONTINUED | OUTPATIENT
Start: 2022-04-29 | End: 2022-05-01

## 2022-04-29 RX ADMIN — ROPINIROLE HYDROCHLORIDE 4 MG: 2 TABLET, FILM COATED ORAL at 20:41

## 2022-04-29 RX ADMIN — IPRATROPIUM BROMIDE AND ALBUTEROL SULFATE 1 AMPULE: .5; 2.5 SOLUTION RESPIRATORY (INHALATION) at 16:16

## 2022-04-29 RX ADMIN — METOPROLOL SUCCINATE 25 MG: 25 TABLET, EXTENDED RELEASE ORAL at 08:11

## 2022-04-29 RX ADMIN — HEPARIN SODIUM 5000 UNITS: 10000 INJECTION INTRAVENOUS; SUBCUTANEOUS at 05:39

## 2022-04-29 RX ADMIN — IPRATROPIUM BROMIDE AND ALBUTEROL SULFATE 1 AMPULE: .5; 2.5 SOLUTION RESPIRATORY (INHALATION) at 10:46

## 2022-04-29 RX ADMIN — FUROSEMIDE 20 MG: 10 INJECTION, SOLUTION INTRAMUSCULAR; INTRAVENOUS at 20:42

## 2022-04-29 RX ADMIN — ATORVASTATIN CALCIUM 20 MG: 20 TABLET, FILM COATED ORAL at 08:11

## 2022-04-29 RX ADMIN — CALCIUM POLYCARBOPHIL 625 MG: 625 TABLET, FILM COATED ORAL at 08:11

## 2022-04-29 RX ADMIN — Medication 3 MG: at 22:04

## 2022-04-29 RX ADMIN — Medication 10 ML: at 20:42

## 2022-04-29 RX ADMIN — MEROPENEM 1000 MG: 1 INJECTION, POWDER, FOR SOLUTION INTRAVENOUS at 23:57

## 2022-04-29 RX ADMIN — LOSARTAN POTASSIUM 25 MG: 25 TABLET, FILM COATED ORAL at 08:12

## 2022-04-29 RX ADMIN — LEVOTHYROXINE SODIUM 25 MCG: 0.03 TABLET ORAL at 05:39

## 2022-04-29 RX ADMIN — CALCIUM POLYCARBOPHIL 625 MG: 625 TABLET, FILM COATED ORAL at 17:12

## 2022-04-29 RX ADMIN — FUROSEMIDE 20 MG: 10 INJECTION, SOLUTION INTRAMUSCULAR; INTRAVENOUS at 08:11

## 2022-04-29 RX ADMIN — HEPARIN SODIUM 5000 UNITS: 10000 INJECTION INTRAVENOUS; SUBCUTANEOUS at 20:43

## 2022-04-29 RX ADMIN — CHOLECALCIFEROL TAB 10 MCG (400 UNIT) 400 UNITS: 10 TAB at 17:12

## 2022-04-29 RX ADMIN — PANTOPRAZOLE SODIUM 40 MG: 40 TABLET, DELAYED RELEASE ORAL at 05:39

## 2022-04-29 RX ADMIN — Medication 10 ML: at 08:12

## 2022-04-29 RX ADMIN — ASPIRIN 81 MG: 81 TABLET, COATED ORAL at 08:11

## 2022-04-29 RX ADMIN — CLOPIDOGREL BISULFATE 75 MG: 75 TABLET ORAL at 08:11

## 2022-04-29 RX ADMIN — POTASSIUM BICARBONATE 20 MEQ: 782 TABLET, EFFERVESCENT ORAL at 08:11

## 2022-04-29 RX ADMIN — AMLODIPINE BESYLATE 10 MG: 10 TABLET ORAL at 08:11

## 2022-04-29 RX ADMIN — IPRATROPIUM BROMIDE AND ALBUTEROL SULFATE 1 AMPULE: .5; 2.5 SOLUTION RESPIRATORY (INHALATION) at 18:59

## 2022-04-29 RX ADMIN — VANCOMYCIN HYDROCHLORIDE 1000 MG: 1 INJECTION, POWDER, LYOPHILIZED, FOR SOLUTION INTRAVENOUS at 15:20

## 2022-04-29 RX ADMIN — HEPARIN SODIUM 5000 UNITS: 10000 INJECTION INTRAVENOUS; SUBCUTANEOUS at 14:09

## 2022-04-29 RX ADMIN — MEROPENEM 1000 MG: 1 INJECTION, POWDER, FOR SOLUTION INTRAVENOUS at 11:48

## 2022-04-29 RX ADMIN — IPRATROPIUM BROMIDE AND ALBUTEROL SULFATE 1 AMPULE: .5; 2.5 SOLUTION RESPIRATORY (INHALATION) at 13:49

## 2022-04-29 NOTE — CARE COORDINATION
4/29 Update CM note. [facility] for possibility of [facility]ON[facility]D on discharge. Merrem and Vancomycin IV, and Lasix 20 MG IV continue. BNP has increased to 5,409. ECHO completed yesterday, EF 55-60% (see report for full findings). CXR today shows persistent right upper and lower lobe alveolar opacity with new left perihilar asymmetric alveloar compatible with worsening PNA. Blood gases were drawn yesterday show a showing of 61.9. Patient currently on BiPAP with adjustments made by pulm. Discharge plan is unclear at this time d/t medical condition. Will further discuss with patient's daughter when she returns call. 1517  Received call from patient's [facility] regarding f/u for RONALD choices. She stated at this point [facility] is only one she has decided upon d/t location. Referral made to [facility] stated she will continue to review list for further choices. Also requested palliative consult from Dr. Jay Freire for goals of care and family support, order placed. Hailee Salazar, BSN, RN  [facility] Case Management   Cell: [phone]     The Plan for Transition of Care is related to the following treatment goals: RONALD    The Patient and/or [facility] representative [facility] (daughter) was provided with a choice of provider and agrees with the discharge plan. [x] Yes [] No    Freedom of choice list was provided with basic dialogue that supports the patient's individualized plan of care/goals, treatment preferences and shares the quality data associated with the providers.  [x] Yes [] No

## 2022-04-29 NOTE — PROGRESS NOTES
Hospitalist Progress Note      Synopsis: Patient admitted for shortness of breath   patient is a 80 y.o. year old female with a recent TAVR at [facility] 2021, who was recently seen by her primary care provider with complaint of shortness of breath, and lower extremity edema.  Her primary care provider recommended her go to the ER for further evaluation and diuresis.  Patient lives with her daughter and up until about 3 weeks ago was very active and able to ambulate well around her home with a walker.  Patient's daughter states over the last 3 weeks her shortness of breath has gotten progressively worse.    Patient was admitted to telemetry for further monitoring. During the course of her stay and RRT was called due to change in mental status, patient's CODE STATUS was changed to DNR CCA.  Pulmonology consulted for BiPAP dependence. A CT of the chest revealed large consolidation suggestive of pneumonia in the right upper lobe posteriorly. One-time dose of Vanco was ordered IV, IV meropenem was added. Hospital day 3     Subjective:  Patient seen and examined at bedside, patient BiPAP dependent. States she is comfortable and feeling better. Stable overnight. No issues reported. Patient seen and examined  Records reviewed. Temp (24hrs), Av °F (36.7 °C), Min:97 °F (36.1 °C), Max:99 °F (37.2 °C)    DIET: ADULT DIET; Regular; Low Sodium (2 gm)  CODE: Limited    Intake/Output Summary (Last 24 hours) at 2022 0927  Last data filed at 2022 0545  Gross per 24 hour   Intake 100 ml   Output 1300 ml   Net -1200 ml       Review of Systems: All bolded are positive; please see HPI  General:  Fever, chills, diaphoresis, fatigue, malaise, night sweats, weight loss  Psychological:  Anxiety, disorientation, hallucinations. ENT:  Epistaxis, headaches, vertigo, visual changes. Cardiovascular:  Chest pain, irregular heartbeats, palpitations, paroxysmal nocturnal dyspnea.   Respiratory:  Shortness of breath, coughing, sputum production, hemoptysis, wheezing, orthopnea. Gastrointestinal:  Nausea, vomiting, diarrhea, heartburn, constipation, abdominal pain, hematemesis, hematochezia, melena, acholic stools  Genito-Urinary:  Dysuria, urgency, frequency, hematuria  Musculoskeletal:  Joint pain, joint stiffness, joint swelling, muscle pain  Neurology:  Headache, focal neurological deficits, weakness, numbness, paresthesia  Derm:  Rashes, ulcers, excoriations, bruising  Extremities:  Decreased ROM, peripheral edema, mottling    Objective:    BP (!) 123/58   Pulse 71   Temp 97.9 °F (36.6 °C) (Temporal)   Resp 20   Ht 4' 11\" (1.499 m)   Wt 141 lb 12.1 oz (64.3 kg)   SpO2 98%   BMI 28.63 kg/m²     General appearance: Elderly female, visibly uncomfortable on BiPAP, appears stated age and cooperative. Respiratory: Rhonchi and wheezes in upper airway, crackles in lower lung fields to auscultation bilaterally.  Tachypneic and labored respiratory effort. Cardiovascular:  RRR. S1, S2 without MRG. PV: Pulses palpable. 2 + pitting edema in bilateral lower extremities  Abdomen: Soft, non-tender, non-distended. +BS  Musculoskeletal: No obvious deformities. Skin: Normal skin color pale. Neurologic:  Grossly non-focal. Awake, alert to voice, not following commands. Psychiatric: Alert and oriented x4, thought content appropriate.      Medications:  REVIEWED DAILY    Infusion Medications    sodium chloride       Scheduled Medications    meropenem  1,000 mg IntraVENous Q12H    losartan  25 mg Oral Daily    metoprolol succinate  25 mg Oral Daily    pantoprazole  40 mg Oral QAM AC    potassium bicarb-citric acid  20 mEq Oral Daily    ipratropium-albuterol  1 ampule Inhalation Q4H WA    vancomycin (VANCOCIN) intermittent dosing (placeholder)   Other RX Placeholder    amLODIPine  10 mg Oral Daily    aspirin  81 mg Oral Daily    atorvastatin  20 mg Oral Daily    [Held by provider] cloNIDine  0.1 mg Oral BID    clopidogrel  75 mg Oral Daily    polycarbophil  625 mg Oral BID    levothyroxine  25 mcg Oral Daily    rOPINIRole  4 mg Oral Nightly    vitamin D3  400 Units Oral Daily    sodium chloride flush  5-40 mL IntraVENous 2 times per day    heparin (porcine)  5,000 Units SubCUTAneous 3 times per day    furosemide  20 mg IntraVENous BID     PRN Meds: guaiFENesin-dextromethorphan, benzonatate, perflutren lipid microspheres, hydrALAZINE, sodium chloride flush, sodium chloride, ondansetron **OR** ondansetron, polyethylene glycol, acetaminophen **OR** acetaminophen, melatonin    Labs:     Recent Labs     04/27/22  0635 04/28/22  1115 04/29/22  0517   WBC 14.7* 13.5* 11.4   HGB 9.9* 9.3* 9.3*   HCT 33.2* 31.6* 31.5*    192 194       Recent Labs     04/27/22  0635 04/28/22  0650 04/29/22  0517    137 139   K 4.2 4.3 4.2   CL 94* 94* 95*   CO2 31* 32* 34*   BUN 26* 25* 26*   CREATININE 1.5* 1.4* 1.4*   CALCIUM 9.0 8.9 9.1       Recent Labs     04/26/22  1454 04/27/22  0635   PROT 6.9 6.6   ALKPHOS 119* 154*   ALT 22 21   AST 34* 38*   BILITOT 0.4 0.4       No results for input(s): INR in the last 72 hours. No results for input(s): Towana Ball in the last 72 hours. 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   · ZUI 3893 PW presentation  Echocardiogram,  Normal left ventricular chamber size.   Normal left ventricular systolic function.   Visually estimated LVEF is 55-60 %. Mild concentric LVH.   No wall motion abnormalities.   Grade II diastolic dysfunction with elevated LA pressure.   Mildly dilated right ventricle with preserved function.   The left atrium is moderately dilated.   Normal right atrial size.   Mild pulmonary hypertension with a PASP of 42 mm Hg  · Toprol XL 25 mg  · Daily weights  · BiPAP   · Pulmonology consulted  · Procalcitonin 0.35 yesterday, 0.26 today  · Respiratory culture shows abundant polymorphonuclear leukocytes and moderate Gram positive cocci in pairs  · One-time dose of vancomycin IV  · On meropenem IV  · Strict intake and output  · IV Lasix 20 mg BID  · Monitor electrolytes, especially K  · cardiology consult  · New chest x-ray shows persistent right upper and lower lobe alveolar opacity with new left perihilar asymmetric alveolar  compatible with worsening pneumonia  · Continue home medications Norvasc 2.5 mg, aspirin 81 mg, Lipitor 20 mg, Catapres 0.1 mg, Plavix 75 mg, Synthroid 25 mg,        DVT Prophylaxis [] Lovenox  [x]  Heparin [] DOAC [] PCDs [] Ambulation    GI Prophylaxis [] PPI  [] H2 Blocker   [] Carafate  [x] Diet/Tube Feeds   Level of care [] Med/Surg  [x] Intermediate  []  ICU   Diet ADULT DIET; Regular; Low Sodium (2 gm)    Family contact []  N/A    [x] At bedside  [] Phone call   Disposition Patient requires continued admission treatment of developing pneumonia   MDM [] Low    [x] Moderate  []   High       Discharge Plan: To SNF or possibly with home health care when clinically stable    +++++++++++++++++++++++++++++++++++++++++++++++++  SIVAKUMAR Kyle/ Chalo Lawrence 19, [address]  +++++++++++++++++++++++++++++++++++++++++++++++++  NOTE: This report was transcribed using voice recognition software.  Every effort was made to ensure accuracy; however, inadvertent computerized transcription errors may be present.

## 2022-04-29 NOTE — PATIENT CARE CONFERENCE
[facility] Quality Flow/Interdisciplinary Rounds Progress Note        Quality Flow Rounds held on April 29, 2022    Disciplines Attending:  Bedside Nurse, ,  and Nursing Unit Leadership    Carolina Cassidy was admitted on 4/26/2022  2:32 PM    Anticipated Discharge Date:  Expected Discharge Date: 05/02/22    Disposition:    Avi Score:  Avi Scale Score: 20    Readmission Risk              Risk of Unplanned Readmission:  16           Discussed patient goal for the day, patient clinical progression, and barriers to discharge.   The following Goal(s) of the Day/Commitment(s) have been identified:  Diagnostics - Report Results and Labs - Report Results      Juan Montoya RN  April 29, 2022

## 2022-04-29 NOTE — PROGRESS NOTES
[place]  Department of Pulmonary, Critical Care and Sleep Medicine  [address] [facility]  Department of Internal Medicine  Progress Note    SUBJECTIVE:    Events of last night noted. Patient seen while on BiPAP. Daughter at bedside . OBJECTIVE:  Vitals:    04/29/22 1349 04/29/22 1509 04/29/22 1616 04/29/22 1618   BP:  (!) 104/50     Pulse:  72     Resp:  25 25 25   Temp:  98.2 °F (36.8 °C)     TempSrc:  Temporal     SpO2: 96% 97%  97%   Weight:       Height:         Constitutional: Alert, on BiPAP  EENT: EOMI YAEL. Mucous membranes dry  Neck: No thyromegaly. Trachea was midline. Respiratory: Scattered rales bilaterally mid to base  Cardiovascular: Regular, No murmur. No rubs. Pulses:  Equal bilaterally. Abdomen: Soft without organomegaly. No rebound, rigidity. No guarding. Lymphatic: No lymphadenopathy. Musculoskeletal: Without weakness or gross deficits  Extremities:  No lower extremity edema. Reflexes appear adequate. Skin:  Warm and dry. No skin rashes. Neurological/Psychiatric: No acute psychosis. Cranial nerves are intact. DATA:    Monitor Strips:  Reviewed & discusses with technical team. No changes noted. RADIOLOGY:  Chest x-ray reviewed. Patient significantly rotated on film. Overall appears grossly stable. Discussed with patient's daughter.     CBC with Differential:    Lab Results   Component Value Date    WBC 11.4 04/29/2022    RBC 3.60 04/29/2022    HGB 9.3 04/29/2022    HCT 31.5 04/29/2022     04/29/2022    MCV 87.5 04/29/2022    MCH 25.8 04/29/2022    MCHC 29.5 04/29/2022    RDW 18.2 04/29/2022    SEGSPCT 62 02/18/2014    LYMPHOPCT 6.2 04/27/2022    MONOPCT 14.1 04/27/2022    EOSPCT 4 10/11/2010    BASOPCT 0.2 04/27/2022    MONOSABS 2.07 04/27/2022    LYMPHSABS 0.91 04/27/2022    EOSABS 0.04 04/27/2022    BASOSABS 0.03 04/27/2022     CMP:    Lab Results   Component Value Date     04/29/2022    K 4.2 04/29/2022    CL 95 04/29/2022    CO2 34 04/29/2022    BUN 26 04/29/2022    CREATININE 1.4 04/29/2022    GFRAA 43 04/29/2022    LABGLOM 35 04/29/2022    GLUCOSE 96 04/29/2022    GLUCOSE 102 04/06/2012    PROT 6.6 04/27/2022    LABALBU 3.3 04/27/2022    LABALBU 4.7 04/06/2012    CALCIUM 9.1 04/29/2022    BILITOT 0.4 04/27/2022    ALKPHOS 154 04/27/2022    AST 38 04/27/2022    ALT 21 04/27/2022     ABG:    Lab Results   Component Value Date    PH 7.349 04/28/2022    PCO2 61.9 04/28/2022    PO2 80.6 04/28/2022    HCO3 33.3 04/28/2022    BE 6.4 04/28/2022    O2SAT 95.3 04/28/2022       Assessment:   1. Pneumonia  2. Acute hypoxemic respiratory failure  3. Hypercarbic respiratory failure  4. CKD  5. Protein calorie malnutrition  6. Leukocytosis-improving  7. Continue BiPAP at at bedtime and with naps.        Plan:   1. Sputum culture ordered gram stain with gram-positive cocci. 2. CT of chest reviewed. While official read is pending it does appear the patient has developed a significant pneumonia on the right side with air bronchograms and consolidation. There is an adjacent pleural effusion. 3. MRSA nasal screen negative however we will continue vancomycin and meropenem at this time  4. Continue diuretic as per primary. 5. Flutter valve ordered. 6. Wean FiO2 as tolerated  7. DuoNebs every 4 hours while awake  8.  Continue BiPAP at at bedtime and with naps.     Thank you for the consult pulmonary will continue to follow     Nany Fine DO   Pulmonary, Critical Care and Sleep Medicine

## 2022-04-29 NOTE — PROGRESS NOTES
INPATIENT CARDIOLOGY FOLLOW-UP    Name: Iker Ugalde    Age: 80 y.o. Date of Admission: 4/26/2022  2:32 PM    Date of Service: 4/29/2022    Primary Cardiologist: New to me from this admission    Chief Complaint: Follow-up for acute hypoxic and hypercapnic Respiratory failure. Acute decompensated heart failure. Interim History:  No new overnight cardiac complaints. Currently with no complaints of CP, SOB, palpitations, dizziness, or lightheadedness. SR on telemetry. No significant arrhythmias seen. Was on 13 L of oxygen via nasal cannula with worsening oxygen saturation. Now back on BiPAP therapy. 2.3 L negative this admission so far. BNP showing worsening trends.     Review of Systems:   Negative except as described above    Problem List:  Patient Active Problem List   Diagnosis    Hip pain    Aseptic loosening of prosthetic hip ([facility]    DJD (degenerative joint disease) of knee    Knee pain    HTN (hypertension)    HLD (hyperlipidemia)    Closed fracture of left femur ([facility]    Closed subtrochanteric fracture of left femur with delayed healing    Near syncope    Chronic congestive heart failure ([facility]    Acute hypoxemic respiratory failure (HCC)    History of transcatheter aortic valve replacement (TAVR)       Current Medications:    Current Facility-Administered Medications:     vancomycin 1000 mg IVPB in 250 mL D5W addavial, 1,000 mg, IntraVENous, Once, naaptol, [facility]    meropenem (MERREM) 1,000 mg in sodium chloride 0.9 % 100 mL IVPB (mini-bag), 1,000 mg, IntraVENous, Q12H, Janeth Garrett DO, Last Rate: 33.3 mL/hr at 04/29/22 1148, 1,000 mg at 04/29/22 1148    guaiFENesin-dextromethorphan (ROBITUSSIN DM) 100-10 MG/5ML syrup 5 mL, 5 mL, Oral, Q4H PRN, Eve Ashley DO    benzonatate (TESSALON) capsule 100 mg, 100 mg, Oral, TID PRN, Eve Ashley DO, 100 mg at 04/27/22 2646    losartan (COZAAR) tablet 25 mg, 25 mg, Oral, Daily, Eve Ashley DO, 25 mg at 04/29/22 7108    metoprolol succinate (TOPROL XL) extended release tablet 25 mg, 25 mg, Oral, Daily, Marguerite Quivers, DO, 25 mg at 04/29/22 0811    pantoprazole (PROTONIX) tablet 40 mg, 40 mg, Oral, QAM AC, Marguerite Quivers, DO, 40 mg at 04/29/22 0539    potassium bicarb-citric acid (EFFER-K) effervescent tablet 20 mEq, 20 mEq, Oral, Daily, Marguerite Quivers, DO, 20 mEq at 04/29/22 5147    perflutren lipid microspheres (DEFINITY) injection 1.65 mg, 1.5 mL, IntraVENous, ONCE PRN, Alfonso Wilkins PA-C    hydrALAZINE (APRESOLINE) injection 10 mg, 10 mg, IntraVENous, Q6H PRN, Nandini Montalvo PA-C    ipratropium-albuterol (DUONEB) nebulizer solution 1 ampule, 1 ampule, Inhalation, Q4H WA, Shazia Barrientos DO, 1 ampule at 04/29/22 1349    vancomycin (VANCOCIN) intermittent dosing (placeholder), , Other, RX Placeholder, Shazia Hernández,     amLODIPine (NORVASC) tablet 10 mg, 10 mg, Oral, Daily, MICKI Wang CNP, 10 mg at 04/29/22 1006    aspirin EC tablet 81 mg, 81 mg, Oral, Daily, MICKI Wang - CNP, 81 mg at 04/29/22 9993    atorvastatin (LIPITOR) tablet 20 mg, 20 mg, Oral, Daily, MICKI Wang - CNP, 20 mg at 04/29/22 7017    [Held by provider] cloNIDine (CATAPRES) tablet 0.1 mg, 0.1 mg, Oral, BID, MICKI Wang - CNP, 0.1 mg at 04/26/22 2316    clopidogrel (PLAVIX) tablet 75 mg, 75 mg, Oral, Daily, MICKI Wang - CNP, 75 mg at 04/29/22 4378    polycarbophil (FIBERCON) tablet 625 mg, 625 mg, Oral, BID, MICKI Wang - CNP, 625 mg at 04/29/22 0646    levothyroxine (SYNTHROID) tablet 25 mcg, 25 mcg, Oral, Daily, MICKI Wang CNP, 25 mcg at 04/29/22 0539    rOPINIRole (REQUIP) tablet 4 mg, 4 mg, Oral, Nightly, MICKI Wang CNP, 4 mg at 04/28/22 2110    vitamin D3 (CHOLECALCIFEROL) tablet 400 Units, 400 Units, Oral, Daily, MICKI Wang CNP, 400 Units at 04/27/22 1745    sodium chloride flush 0.9 % injection 5-40 mL, 5-40 mL, IntraVENous, 2 times per day, Harpreet Ruby, APRN - CNP, 10 mL at 04/29/22 0983    sodium chloride flush 0.9 % injection 5-40 mL, 5-40 mL, IntraVENous, PRN, Harpreet Ruby, APRN - CNP    0.9 % sodium chloride infusion, , IntraVENous, PRN, Harpreet Ruby, APRN - CNP    ondansetron (ZOFRAN-ODT) disintegrating tablet 4 mg, 4 mg, Oral, Q8H PRN **OR** ondansetron (ZOFRAN) injection 4 mg, 4 mg, IntraVENous, Q6H PRN, Harpreet Ruby, APRN - CNP    polyethylene glycol (GLYCOLAX) packet 17 g, 17 g, Oral, Daily PRN, Harpreet Ruby, APRN - CNP    acetaminophen (TYLENOL) tablet 650 mg, 650 mg, Oral, Q6H PRN **OR** acetaminophen (TYLENOL) suppository 650 mg, 650 mg, Rectal, Q6H PRN, Harpreet Ruby, APRN - CNP    heparin (porcine) injection 5,000 Units, 5,000 Units, SubCUTAneous, 3 times per day, Harpreet Ruby, APRN - CNP, 5,000 Units at 04/29/22 1409    furosemide (LASIX) injection 20 mg, 20 mg, IntraVENous, BID, Janeth Garrett DO, 20 mg at 04/29/22 8044    melatonin tablet 3 mg, 3 mg, Oral, Nightly PRN, Amna Garcia DO    Physical Exam:  BP (!) 104/50   Pulse 72   Temp 98.2 °F (36.8 °C) (Temporal)   Resp 25   Ht 4' 11\" (1.499 m)   Wt 141 lb 12.1 oz (64.3 kg)   SpO2 97%   BMI 28.63 kg/m²   Wt Readings from Last 3 Encounters:   04/29/22 141 lb 12.1 oz (64.3 kg)   09/12/20 133 lb 0.1 oz (60.3 kg)   05/25/16 134 lb (60.8 kg)     Appearance: Awake, alert, no acute respiratory distress  Skin: Intact, no rash  Head: Normocephalic, atraumatic  Eyes: EOMI, no conjunctival erythema  ENMT: No pharyngeal erythema, MMM, no rhinorrhea  Neck: Supple, JVP elevated at 12 cm, no carotid bruits  Lungs: Clear to auscultation bilaterally. No wheezes, rales, or rhonchi.   Cardiac: PMI nondisplaced, Regular rhythm with a normal rate, S1 & S2 normal, no murmurs  Abdomen: Soft, nontender, +bowel sounds  Extremities: Moves all extremities x 4, no lower extremity edema  Neurologic: No focal motor deficits apparent, normal mood and affect  Peripheral Pulses: Intact posterior tibial pulses bilaterally    Intake/Output:    Intake/Output Summary (Last 24 hours) at 4/29/2022 1519  Last data filed at 4/29/2022 1342  Gross per 24 hour   Intake 580 ml   Output 700 ml   Net -120 ml     I/O this shift:   In: 480 [P.O.:480]  Out: -     Laboratory Tests:  Recent Labs     04/27/22  0635 04/28/22  0650 04/29/22  0517    137 139   K 4.2 4.3 4.2   CL 94* 94* 95*   CO2 31* 32* 34*   BUN 26* 25* 26*   CREATININE 1.5* 1.4* 1.4*   GLUCOSE 162* 116* 96   CALCIUM 9.0 8.9 9.1     Lab Results   Component Value Date    MG 1.9 04/29/2022     Recent Labs     04/27/22  0635   ALKPHOS 154*   ALT 21   AST 38*   PROT 6.6   BILITOT 0.4   LABALBU 3.3*     Recent Labs     04/27/22  0635 04/28/22  1115 04/29/22  0517   WBC 14.7* 13.5* 11.4   RBC 3.84 3.63 3.60   HGB 9.9* 9.3* 9.3*   HCT 33.2* 31.6* 31.5*   MCV 86.5 87.1 87.5   MCH 25.8* 25.6* 25.8*   MCHC 29.8* 29.4* 29.5*   RDW 18.3* 18.3* 18.2*    192 194   MPV 10.8 10.7 10.8     Lab Results   Component Value Date    CKTOTAL 115 02/14/2014    CKMB 3.1 02/14/2014    TROPONINI <0.01 09/11/2020    TROPONINI <0.01 02/14/2014     Lab Results   Component Value Date    INR 0.9 08/06/2021    INR 1.0 08/20/2015    INR 1.1 02/18/2014    PROTIME 11.0 08/06/2021    PROTIME 11.3 08/20/2015    PROTIME 12.1 02/18/2014     Lab Results   Component Value Date    TSH 0.822 04/26/2022     Lab Results   Component Value Date    LABA1C 5.9 02/15/2014     No results found for: EAG  Lab Results   Component Value Date    CHOL 131 04/27/2022    CHOL 187 04/05/2021    CHOL 206 (H) 11/12/2020     Lab Results   Component Value Date    TRIG 66 04/27/2022    TRIG 85 04/05/2021    TRIG 112 11/12/2020     Lab Results   Component Value Date    HDL 64 04/27/2022    HDL 95 04/19/2022    HDL 88 04/05/2021     Lab Results   Component Value Date    LDLCALC 54 04/27/2022    LDLCALC 71 04/19/2022    [facility] 82 04/05/2021     Lab Results Component Value Date    LABVLDL 13 04/27/2022    LABVLDL 17 04/19/2022    LABVLDL 17 04/05/2021     No results found for: CHOLHDLRATIO  Recent Labs     04/29/22  0517   PROBNP 5,409*       Cardiac Tests:    1. HFpEF:  ? Echocardiogram (2/15/2014): All cardiac chamber sizes including aortic root size are within normallimits. The left ventricle shows normal wall thicknesses.  Diastolic filling dysfunction stage 1 is seen.  Systolic function is well preserved with ejection fraction estimated at 66%.  No focal wall motion abnormality is seen. The mitral valve shows thickening of the leaflets.  No significant mitral stenosis.  Mitral valve area 3.3 cm2 by pressure half-time with a maximal gradient of 4.6 mmHg.  There is no mitral stenosis.  There is 1+ to at most 2+ mitral regurgitation. Aortic valve appears structurally normal, is sclerotic.  Maximal gradient 17 mmHg.  Mean gradient 9.9.  There is mild aortic stenosis with trace aortic insufficiency present.  Aortic valve area estimated at 1.6 cm2. There is no pulmonic stenosis or insufficiency.  There is 2+ tricuspid regurgitation with pulmonary hypertension at 40 mmHg. There is no significant pericardial effusion or any definite intracavitary mass, or thrombus, or shunt identified on this study. ? Echocardiogram (9/12/2020):  Summary: Ejection fraction is visually estimated at 61%. Overall ejection fraction is normal. There is doppler evidence of stage I diastolic dysfunction. Physiologic and/or trace mitral regurgitation is present. Mild mitral annular calcification. The aortic valve appears moderately sclerotic. Mild aortic regurgitation is noted. Moderate aortic stenosis. Mild tricuspid regurgitation. ?  Lexiscan stress test (2/17/2014): LVEF equals 73%.  Gated wall motion examination was performed in conjunction with cardiac SPECT imaging.  Left ventricular wall motion is fairly uniform with no demonstration of focal or global hypokinesia. Eloisa Klarissa is no demonstration of left ventricular dilatation.  Impression: No fixed or reversible perfusion abnormality involving the left ventricle induced by Lexiscan stress. 2. Aortic stenosis with TAVR ( 2021): Awaiting records at this time. 3. TTE 4/28/22:   Normal left ventricular chamber size. Normal left ventricular systolic function. Visually estimated LVEF is 55-60 %. Mild concentric LVH. No wall motion abnormalities. Grade II diastolic dysfunction with elevated LA pressure. Mildly dilated right ventricle with preserved function. The left atrium is moderately dilated. Normal right atrial size. Mild pulmonary hypertension with a PASP of 42 mm Hg. There is a bioprosthetic aortic valve in place that appears well seated   with acceptable function and gradients. Trace regurgitation present. No comparison study available.     I have personally participated in the history, exam, diagnosis with my Advanced Practice Nurse/Physician Assistant on the date of service. I discussed pertinent history, exam findings, and treatment plan with our physician extender.     ASSESSMENT:  1. Acute hypercapnic/hypoxic respiratory failure. 2. Possible component of acute on chronic HFpEF:  ? Echo 9/12/2020, EF 61%, evidence of stage I diastolic dysfunction, moderately sclerosed aortic valve, mild AR, moderate AS, mild TR.   ? Appears mildly hypervolemic on exam, proBNP 4700  3. Right upper lobe pneumonia, being managed by primary care team, now on ATB.   Leukocytosis and elevated procalcitonin. 4. Altered mental status, most likely secondary to hypercapnia.  Patient now on BiPAP and being managed by primary care team.  5. CARLOS/on likely CKD.  Cr baseline appears around 1.0 to 1.1, now 1.4 --> 1.5.  6. AS s/p reported TAVR ( 2021), awaiting records. 7. Hypertension, controlled  8. HLD on Lipitor  9. Hypothyroidism on HRT  10. DNR-CCA code status        RECOMMENDATIONS:  1. Echocardiogram results as above.   2. Good response to IV diuresis. Will increase Lasix to 20 3 times daily. 3. Hold clonidine at this time, IV hydralazine as needed for SBP >160  4. Strict I&O's with daily weights, monitor renal function. 5. Continue home cardiac medications including Toprol-XL, potassium, Norvasc, Lipitor, Plavix, Losartan, ASA as able. 6. She should follow-up with the congestive heart failure clinic postdischarge. 7. Pulmonary medicine on board for management of pulmonary issues. Appreciate recommendations. Respiratory failure is predominantly being driven by her pneumonia. Cheri Machado MD, [facility] Cardiology    NOTE: This report was transcribed using voice recognition software. Every effort was made to ensure accuracy; however, inadvertent computerized transcription errors may be present.

## 2022-04-30 ENCOUNTER — APPOINTMENT (OUTPATIENT)
Dept: GENERAL RADIOLOGY | Age: 87
DRG: 193 | End: 2022-04-30
Payer: MEDICARE

## 2022-04-30 LAB
ANION GAP SERPL CALCULATED.3IONS-SCNC: 9 MMOL/L (ref 7–16)
BUN BLDV-MCNC: 20 MG/DL (ref 6–23)
CALCIUM SERPL-MCNC: 9.2 MG/DL (ref 8.6–10.2)
CHLORIDE BLD-SCNC: 94 MMOL/L (ref 98–107)
CO2: 37 MMOL/L (ref 22–29)
CREAT SERPL-MCNC: 1.1 MG/DL (ref 0.5–1)
GFR AFRICAN AMERICAN: 56
GFR NON-AFRICAN AMERICAN: 47 ML/MIN/1.73
GLUCOSE BLD-MCNC: 111 MG/DL (ref 74–99)
HCT VFR BLD CALC: 32.9 % (ref 34–48)
HEMOGLOBIN: 9.9 G/DL (ref 11.5–15.5)
MAGNESIUM: 2.1 MG/DL (ref 1.6–2.6)
MCH RBC QN AUTO: 25.9 PG (ref 26–35)
MCHC RBC AUTO-ENTMCNC: 30.1 % (ref 32–34.5)
MCV RBC AUTO: 86.1 FL (ref 80–99.9)
PDW BLD-RTO: 17.6 FL (ref 11.5–15)
PLATELET # BLD: 214 E9/L (ref 130–450)
PMV BLD AUTO: 11.1 FL (ref 7–12)
POTASSIUM SERPL-SCNC: 4.6 MMOL/L (ref 3.5–5)
RBC # BLD: 3.82 E12/L (ref 3.5–5.5)
SODIUM BLD-SCNC: 140 MMOL/L (ref 132–146)
VANCOMYCIN RANDOM: 13.9 MCG/ML (ref 5–40)
WBC # BLD: 9.6 E9/L (ref 4.5–11.5)

## 2022-04-30 PROCEDURE — 6360000002 HC RX W HCPCS: Performed by: INTERNAL MEDICINE

## 2022-04-30 PROCEDURE — 80202 ASSAY OF VANCOMYCIN: CPT

## 2022-04-30 PROCEDURE — 2580000003 HC RX 258: Performed by: INTERNAL MEDICINE

## 2022-04-30 PROCEDURE — 94640 AIRWAY INHALATION TREATMENT: CPT

## 2022-04-30 PROCEDURE — 99233 SBSQ HOSP IP/OBS HIGH 50: CPT | Performed by: INTERNAL MEDICINE

## 2022-04-30 PROCEDURE — 2580000003 HC RX 258

## 2022-04-30 PROCEDURE — 6370000000 HC RX 637 (ALT 250 FOR IP)

## 2022-04-30 PROCEDURE — 2700000000 HC OXYGEN THERAPY PER DAY

## 2022-04-30 PROCEDURE — 6360000002 HC RX W HCPCS

## 2022-04-30 PROCEDURE — 6370000000 HC RX 637 (ALT 250 FOR IP): Performed by: INTERNAL MEDICINE

## 2022-04-30 PROCEDURE — 80048 BASIC METABOLIC PNL TOTAL CA: CPT

## 2022-04-30 PROCEDURE — 2140000000 HC CCU INTERMEDIATE R&B

## 2022-04-30 PROCEDURE — 6370000000 HC RX 637 (ALT 250 FOR IP): Performed by: FAMILY MEDICINE

## 2022-04-30 PROCEDURE — 83735 ASSAY OF MAGNESIUM: CPT

## 2022-04-30 PROCEDURE — 36415 COLL VENOUS BLD VENIPUNCTURE: CPT

## 2022-04-30 PROCEDURE — 71045 X-RAY EXAM CHEST 1 VIEW: CPT

## 2022-04-30 PROCEDURE — 94660 CPAP INITIATION&MGMT: CPT

## 2022-04-30 PROCEDURE — 85027 COMPLETE CBC AUTOMATED: CPT

## 2022-04-30 RX ADMIN — PANTOPRAZOLE SODIUM 40 MG: 40 TABLET, DELAYED RELEASE ORAL at 06:18

## 2022-04-30 RX ADMIN — IPRATROPIUM BROMIDE AND ALBUTEROL SULFATE 1 AMPULE: .5; 2.5 SOLUTION RESPIRATORY (INHALATION) at 11:39

## 2022-04-30 RX ADMIN — AMLODIPINE BESYLATE 10 MG: 10 TABLET ORAL at 08:47

## 2022-04-30 RX ADMIN — CALCIUM POLYCARBOPHIL 625 MG: 625 TABLET, FILM COATED ORAL at 08:48

## 2022-04-30 RX ADMIN — ROPINIROLE HYDROCHLORIDE 4 MG: 2 TABLET, FILM COATED ORAL at 20:09

## 2022-04-30 RX ADMIN — MEROPENEM 1000 MG: 1 INJECTION, POWDER, FOR SOLUTION INTRAVENOUS at 11:36

## 2022-04-30 RX ADMIN — CLOPIDOGREL BISULFATE 75 MG: 75 TABLET ORAL at 08:47

## 2022-04-30 RX ADMIN — IPRATROPIUM BROMIDE AND ALBUTEROL SULFATE 1 AMPULE: .5; 2.5 SOLUTION RESPIRATORY (INHALATION) at 08:02

## 2022-04-30 RX ADMIN — LEVOTHYROXINE SODIUM 25 MCG: 0.03 TABLET ORAL at 06:18

## 2022-04-30 RX ADMIN — Medication 10 ML: at 20:08

## 2022-04-30 RX ADMIN — Medication 10 ML: at 08:48

## 2022-04-30 RX ADMIN — HEPARIN SODIUM 5000 UNITS: 10000 INJECTION INTRAVENOUS; SUBCUTANEOUS at 14:49

## 2022-04-30 RX ADMIN — HEPARIN SODIUM 5000 UNITS: 10000 INJECTION INTRAVENOUS; SUBCUTANEOUS at 20:12

## 2022-04-30 RX ADMIN — BENZONATATE 100 MG: 100 CAPSULE ORAL at 20:09

## 2022-04-30 RX ADMIN — IPRATROPIUM BROMIDE AND ALBUTEROL SULFATE 1 AMPULE: .5; 2.5 SOLUTION RESPIRATORY (INHALATION) at 15:50

## 2022-04-30 RX ADMIN — LOSARTAN POTASSIUM 25 MG: 25 TABLET, FILM COATED ORAL at 08:48

## 2022-04-30 RX ADMIN — ASPIRIN 81 MG: 81 TABLET, COATED ORAL at 08:47

## 2022-04-30 RX ADMIN — FUROSEMIDE 20 MG: 10 INJECTION, SOLUTION INTRAMUSCULAR; INTRAVENOUS at 20:08

## 2022-04-30 RX ADMIN — CHOLECALCIFEROL TAB 10 MCG (400 UNIT) 400 UNITS: 10 TAB at 16:50

## 2022-04-30 RX ADMIN — IPRATROPIUM BROMIDE AND ALBUTEROL SULFATE 1 AMPULE: .5; 2.5 SOLUTION RESPIRATORY (INHALATION) at 19:10

## 2022-04-30 RX ADMIN — HEPARIN SODIUM 5000 UNITS: 10000 INJECTION INTRAVENOUS; SUBCUTANEOUS at 06:18

## 2022-04-30 RX ADMIN — MEROPENEM 1000 MG: 1 INJECTION, POWDER, FOR SOLUTION INTRAVENOUS at 23:29

## 2022-04-30 RX ADMIN — VANCOMYCIN HYDROCHLORIDE 750 MG: 10 INJECTION, POWDER, LYOPHILIZED, FOR SOLUTION INTRAVENOUS at 18:19

## 2022-04-30 RX ADMIN — POTASSIUM BICARBONATE 20 MEQ: 782 TABLET, EFFERVESCENT ORAL at 08:49

## 2022-04-30 RX ADMIN — ATORVASTATIN CALCIUM 20 MG: 20 TABLET, FILM COATED ORAL at 08:48

## 2022-04-30 RX ADMIN — METOPROLOL SUCCINATE 25 MG: 25 TABLET, EXTENDED RELEASE ORAL at 08:47

## 2022-04-30 RX ADMIN — Medication 3 MG: at 20:09

## 2022-04-30 RX ADMIN — FUROSEMIDE 20 MG: 10 INJECTION, SOLUTION INTRAMUSCULAR; INTRAVENOUS at 08:47

## 2022-04-30 RX ADMIN — FUROSEMIDE 20 MG: 10 INJECTION, SOLUTION INTRAMUSCULAR; INTRAVENOUS at 14:49

## 2022-04-30 ASSESSMENT — PAIN SCALES - GENERAL
PAINLEVEL_OUTOF10: 0
PAINLEVEL_OUTOF10: 0

## 2022-04-30 NOTE — PROGRESS NOTES
[facility]  Department of Pulmonary, Critical Care and Sleep Medicine  [address] [facility]  Department of Internal Medicine  Progress Note    SUBJECTIVE:    Patient seen and examined while receiving breathing treatment, reports that overall her breathing is improving. OBJECTIVE:  Vitals:    04/30/22 0803 04/30/22 0807 04/30/22 1139 04/30/22 1141   BP:       Pulse:       Resp: 16      Temp:       TempSrc:       SpO2:  92% 96% 94%   Weight:       Height:         Constitutional: Alert,  EENT: EOMI YAEL. Mucous membranes dry  Neck: No thyromegaly. Trachea was midline. Respiratory: Scattered rales bilaterally mid to base  Cardiovascular: Regular, No murmur. No rubs. Pulses:  Equal bilaterally. Abdomen: Soft without organomegaly. No rebound, rigidity. No guarding. Lymphatic: No lymphadenopathy. Musculoskeletal: Without weakness or gross deficits  Extremities:  No lower extremity edema. Reflexes appear adequate. Skin:  Warm and dry. No skin rashes. Neurological/Psychiatric: No acute psychosis. Cranial nerves are intact. DATA:    Monitor Strips:  Reviewed & discusses with technical team. No changes noted. RADIOLOGY:  Chest x-ray reviewed. Patient significantly rotated on film.   Overall appears grossly stable    CBC with Differential:    Lab Results   Component Value Date    WBC 9.6 04/30/2022    RBC 3.82 04/30/2022    HGB 9.9 04/30/2022    HCT 32.9 04/30/2022     04/30/2022    MCV 86.1 04/30/2022    MCH 25.9 04/30/2022    MCHC 30.1 04/30/2022    RDW 17.6 04/30/2022    SEGSPCT 62 02/18/2014    LYMPHOPCT 6.2 04/27/2022    MONOPCT 14.1 04/27/2022    EOSPCT 4 10/11/2010    BASOPCT 0.2 04/27/2022    MONOSABS 2.07 04/27/2022    LYMPHSABS 0.91 04/27/2022    EOSABS 0.04 04/27/2022    BASOSABS 0.03 04/27/2022     CMP:    Lab Results   Component Value Date     04/30/2022    K 4.6 04/30/2022    CL 94 04/30/2022    CO2 37 04/30/2022 BUN 20 04/30/2022    CREATININE 1.1 04/30/2022    GFRAA 56 04/30/2022    LABGLOM 47 04/30/2022    GLUCOSE 111 04/30/2022    GLUCOSE 102 04/06/2012    PROT 6.6 04/27/2022    LABALBU 3.3 04/27/2022    LABALBU 4.7 04/06/2012    CALCIUM 9.2 04/30/2022    BILITOT 0.4 04/27/2022    ALKPHOS 154 04/27/2022    AST 38 04/27/2022    ALT 21 04/27/2022     ABG:    Lab Results   Component Value Date    PH 7.349 04/28/2022    PCO2 61.9 04/28/2022    PO2 80.6 04/28/2022    HCO3 33.3 04/28/2022    BE 6.4 04/28/2022    O2SAT 95.3 04/28/2022       Assessment:   1. Pneumonia  2. Acute hypoxemic respiratory failure  3. Hypercarbic respiratory failure  4. CKD  5. Protein calorie malnutrition  6. Leukocytosis-improving  7. Continue BiPAP at at bedtime and with naps.        Plan:   1. Sputum culture ordered gram stain with gram-positive cocci. 2. CT of chest reviewed. While official read is pending it does appear the patient has developed a significant pneumonia on the right side with air bronchograms and consolidation. There is an adjacent pleural effusion. 3. MRSA nasal screen negative however we will continue vancomycin and meropenem at this time  4. Continue diuretic as per primary. 5. Flutter valve ordered. 6. Wean FiO2 as tolerated  7. DuoNebs every 4 hours while awake  8.  Continue BiPAP at at bedtime and with naps.     Thank you for the consult pulmonary will continue to follow     Xiao Silverio DO   Pulmonary, Critical Care and Sleep Medicine

## 2022-04-30 NOTE — PLAN OF CARE
Problem: Pain  Goal: Verbalizes/displays adequate comfort level or baseline comfort level  Outcome: Progressing     Problem: Chronic Conditions and Co-morbidities  Goal: Patient's chronic conditions and co-morbidity symptoms are monitored and maintained or improved  Outcome: Progressing     Problem: Skin/Tissue Integrity  Goal: Absence of new skin breakdown  Description: 1. Monitor for areas of redness and/or skin breakdown  2. Assess vascular access sites hourly  3. Every 4-6 hours minimum:  Change oxygen saturation probe site  4. Every 4-6 hours:  If on nasal continuous positive airway pressure, respiratory therapy assess nares and determine need for appliance change or resting period.   Outcome: Progressing

## 2022-04-30 NOTE — PROGRESS NOTES
Pharmacy Consultation Note  (Antibiotic Dosing and Monitoring)    Initial consult date: 4/27/22  Consulting physician/provider: Dr. Shiraz Jaimes  Drug: Vancomycin  Indication: PNA    Age/  Gender Height Weight IBW  Allergy Information   89 y.o./female 4' 11\" (149.9 cm) 145 lb 4.5 oz (65.9 kg)     Ideal body weight: 48.8 kg (107 lb 8.4 oz)  Adjusted ideal body weight: 55 kg (121 lb 3.5 oz)   Elemental sulfur, Ibuprofen, and Penicillins      Renal Function:  Recent Labs     04/28/22  0650 04/29/22  0517 04/30/22  0647   BUN 25* 26* 20   CREATININE 1.4* 1.4* 1.1*       Intake/Output Summary (Last 24 hours) at 4/30/2022 1115  Last data filed at 4/30/2022 0921  Gross per 24 hour   Intake 600 ml   Output 1350 ml   Net -750 ml       Vancomycin Monitoring:  Trough:  No results for input(s): VANCOTROUGH in the last 72 hours. Random:    Recent Labs     04/29/22  0517 04/30/22  0647   VANCORANDOM 8.0 13.9       Recent vancomycin administrations                   vancomycin 1000 mg IVPB in 250 mL D5W addavial (mg) 1,000 mg New Bag 04/29/22 1520    vancomycin (VANCOCIN) 1,250 mg in dextrose 5 % 250 mL IVPB (mg) 1,250 mg New Bag 04/27/22 6504              Assessment:  · Patient is a 80 y.o. female who has been initiated on vancomycin  Estimated Creatinine Clearance: 30 mL/min (A) (based on SCr of 1.1 mg/dL (H)). · To dose vancomycin, pharmacy will be utilizing dosing based off of levels because of patient's renal impairment/insufficiency   · 4/27: Current sCr above baseline up to 1.5 from previous baseline ~1  · 4/28: WBC, Scr 1.4, MRSA nares swab pending  · 4/29: WBC 11.4, Scr 1.4, vancomycin level at 05:17 = 8 mcg/mL  · 4/30: WBC 9.6, Scr 1.1, vancomycin level at 06:47 = 13.9 mcg/mL    Plan:  · Vancomycin 750 mg IV x 1  · Will check vancomycin level with AM labs 05/1.   · Will continue to monitor renal function     Jemima Healy, PharmD candidate 2023 4/30/2022 11:18 AM       I have reviewed the progress note written by the pharmacy intern and agree with the documentation and treatment plan. All medication and/or lab orders have been entered by myself according to the outlined plan in the note. Pharmacy was consulted to dose/monitor vancomycin which has now been discontinued. Clinical Pharmacy will sign off at this time.     Addendum 4/30 17:33:  Vanco to continue, will still give 750 mg x 1 then random level tomorrow AM    Barbara Mann, PharmD, [address] [facility] 4/30/2022 11:55 AM  Pharmacy Clinical Specialist, Emergency Medicine  [phone]

## 2022-04-30 NOTE — PROGRESS NOTES
INPATIENT CARDIOLOGY FOLLOW-UP    Name: Mi Alcantar    Age: 80 y.o. Date of Admission: 4/26/2022  2:32 PM    Date of Service: 4/30/2022    Chief Complaint: Follow-up for acute hypoxic and hypercapnic respiratory failure, acute on chronic HFpEF    Interim History:  No new overnight cardiac complaints. Currently with no complaints of CP, respiratory distress, palpitations, dizziness, or lightheadedness. SR on telemetry. SPO2 96 on 11 L HFNC. I/O's 2.9 L negative this admission so far.     Review of Systems:   Cardiac: As per HPI  General: No fever, chills  Pulmonary: As per HPI  HEENT: No visual disturbances, difficult swallowing  GI: No nausea, vomiting  : No dysuria, hematuria  Endocrine: +hypothyroidism, no DM  Musculoskeletal: AVELAR x 4, no focal motor deficits  Skin: Intact, no rashes  Neuro: No headache, seizures  Psych: Currently with no depression, anxiety    Problem List:  Patient Active Problem List   Diagnosis    Hip pain    Aseptic loosening of prosthetic hip (HCC)    DJD (degenerative joint disease) of knee    Knee pain    HTN (hypertension)    HLD (hyperlipidemia)    Closed fracture of left femur ([address]    Closed subtrochanteric fracture of left femur with delayed healing    Near syncope    Chronic congestive heart failure (HCC)    Acute hypoxemic respiratory failure (HCC)    History of transcatheter aortic valve replacement (TAVR)       Current Medications:    Current Facility-Administered Medications:     vancomycin (VANCOCIN) intermittent dosing (placeholder), , Other, RX Placeholder, [employer], DO    vancomycin (VANCOCIN) 750 mg in dextrose 5 % 250 mL IVPB, 750 mg, IntraVENous, Once, Shazia Hernández DO    furosemide (LASIX) injection 20 mg, 20 mg, IntraVENous, TID, Sid Harrison MD, 20 mg at 04/30/22 1449    meropenem (MERREM) 1,000 mg in sodium chloride 0.9 % 100 mL IVPB (mini-bag), 1,000 mg, IntraVENous, Q12H, Caren Gosselin, DO, Stopped at 04/30/22 1436   guaiFENesin-dextromethorphan (ROBITUSSIN DM) 100-10 MG/5ML syrup 5 mL, 5 mL, Oral, Q4H PRN, Arlington Curling, DO    benzonatate (TESSALON) capsule 100 mg, 100 mg, Oral, TID PRN, Arlington Curling, DO, 100 mg at 04/27/22 0456    losartan (COZAAR) tablet 25 mg, 25 mg, Oral, Daily, Arlington Curling, DO, 25 mg at 04/30/22 0848    metoprolol succinate (TOPROL XL) extended release tablet 25 mg, 25 mg, Oral, Daily, Arlington Curling, DO, 25 mg at 04/30/22 0847    pantoprazole (PROTONIX) tablet 40 mg, 40 mg, Oral, QAM AC, Arlington Curling, DO, 40 mg at 04/30/22 0618    potassium bicarb-citric acid (EFFER-K) effervescent tablet 20 mEq, 20 mEq, Oral, Daily, Arlington Curling, DO, 20 mEq at 04/30/22 0849    perflutren lipid microspheres (DEFINITY) injection 1.65 mg, 1.5 mL, IntraVENous, ONCE PRN, [facility] SHAHBAZ Wilkins    hydrALAZINE (APRESOLINE) injection 10 mg, 10 mg, IntraVENous, Q6H PRN, [facility] SHAHBAZ Montalvo    ipratropium-albuterol (DUONEB) nebulizer solution 1 ampule, 1 ampule, Inhalation, Q4H WA, Shazia Hernández, DO, 1 ampule at 04/30/22 1550    amLODIPine (NORVASC) tablet 10 mg, 10 mg, Oral, Daily, Iar Mayers, APRN - CNP, 10 mg at 04/30/22 0847    aspirin EC tablet 81 mg, 81 mg, Oral, Daily, Ira Mayers, APRN - CNP, 81 mg at 04/30/22 0847    atorvastatin (LIPITOR) tablet 20 mg, 20 mg, Oral, Daily, Ira Mayers, APRN - CNP, 20 mg at 04/30/22 0848    [Held by provider] cloNIDine (CATAPRES) tablet 0.1 mg, 0.1 mg, Oral, BID, Ira Mayers, APRN - CNP, 0.1 mg at 04/26/22 2316    clopidogrel (PLAVIX) tablet 75 mg, 75 mg, Oral, Daily, Ira Mayers APRN - CNP, 75 mg at 04/30/22 0847    polycarbophil (FIBERCON) tablet 625 mg, 625 mg, Oral, BID, Ira Mayers APRN - CNP, 625 mg at 04/30/22 0848    levothyroxine (SYNTHROID) tablet 25 mcg, 25 mcg, Oral, Daily, MICKI Lin - CNP, 25 mcg at 04/30/22 0618    rOPINIRole (REQUIP) tablet 4 mg, 4 mg, Oral, Nightly, MICKI Lin - CNP, 4 mg at 04/29/22 2041    vitamin D3 (CHOLECALCIFEROL) tablet 400 Units, 400 Units, Oral, Daily, MICKI Maguire CNP, 400 Units at 04/29/22 1712    sodium chloride flush 0.9 % injection 5-40 mL, 5-40 mL, IntraVENous, 2 times per day, MICKI Maguire CNP, 10 mL at 04/30/22 0848    sodium chloride flush 0.9 % injection 5-40 mL, 5-40 mL, IntraVENous, PRN, MICKI Maguire CNP    0.9 % sodium chloride infusion, , IntraVENous, PRN, MICKI Maguire CNP    ondansetron (ZOFRAN-ODT) disintegrating tablet 4 mg, 4 mg, Oral, Q8H PRN **OR** ondansetron (ZOFRAN) injection 4 mg, 4 mg, IntraVENous, Q6H PRN, MICKI Maguire CNP    polyethylene glycol (GLYCOLAX) packet 17 g, 17 g, Oral, Daily PRN, MICKI Maguire CNP    acetaminophen (TYLENOL) tablet 650 mg, 650 mg, Oral, Q6H PRN **OR** acetaminophen (TYLENOL) suppository 650 mg, 650 mg, Rectal, Q6H PRN, MICKI Maguire CNP    heparin (porcine) injection 5,000 Units, 5,000 Units, SubCUTAneous, 3 times per day, MICKI Maguire CNP, 5,000 Units at 04/30/22 1449    melatonin tablet 3 mg, 3 mg, Oral, Nightly PRN, Toby Hoffmann, DO, 3 mg at 04/29/22 2204    Physical Exam:  BP (!) 124/43   Pulse 73   Temp 96.5 °F (35.8 °C) (Oral)   Resp 21   Ht 4' 11\" (1.499 m)   Wt 141 lb 12.1 oz (64.3 kg)   SpO2 94%   BMI 28.63 kg/m²   Wt Readings from Last 3 Encounters:   04/29/22 141 lb 12.1 oz (64.3 kg)   09/12/20 133 lb 0.1 oz (60.3 kg)   05/25/16 134 lb (60.8 kg)     Appearance: Awake, alert and oriented x 3, no acute respiratory distress  Skin: Intact, no rash  Head: Normocephalic, atraumatic  Eyes: EOMI, no conjunctival erythema  ENMT: No pharyngeal erythema, MMM, no rhinorrhea, no dentures  Neck: Supple, no carotid bruits  Lungs: Decreased BS B/L, no wheezing  Cardiac: Regular rate and rhythm, no murmurs apparent  Abdomen: Soft, nontender, +bowel sounds  Extremities: Moves all extremities x 4, no lower extremity edema  Neurologic: No focal motor deficits apparent, normal mood and affect    Intake/Output:    Intake/Output Summary (Last 24 hours) at 4/30/2022 1647  Last data filed at 4/30/2022 1317  Gross per 24 hour   Intake 660 ml   Output 1650 ml   Net -990 ml     I/O this shift: In: 420 [P.O.:420]  Out: 900 [Urine:900]    Laboratory Tests:  Recent Labs     04/28/22  0650 04/29/22  0517 04/30/22  0647    139 140   K 4.3 4.2 4.6   CL 94* 95* 94*   CO2 32* 34* 37*   BUN 25* 26* 20   CREATININE 1.4* 1.4* 1.1*   GLUCOSE 116* 96 111*   CALCIUM 8.9 9.1 9.2     Lab Results   Component Value Date    MG 2.1 04/30/2022     No results for input(s): ALKPHOS, ALT, AST, PROT, BILITOT, BILIDIR, LABALBU in the last 72 hours.   Recent Labs     04/28/22  1115 04/29/22  0517 04/30/22  0647   WBC 13.5* 11.4 9.6   RBC 3.63 3.60 3.82   HGB 9.3* 9.3* 9.9*   HCT 31.6* 31.5* 32.9*   MCV 87.1 87.5 86.1   MCH 25.6* 25.8* 25.9*   MCHC 29.4* 29.5* 30.1*   RDW 18.3* 18.2* 17.6*    194 214   MPV 10.7 10.8 11.1     Lab Results   Component Value Date    CKTOTAL 115 02/14/2014    CKMB 3.1 02/14/2014    TROPONINI <0.01 09/11/2020    TROPONINI <0.01 02/14/2014     Lab Results   Component Value Date    INR 0.9 08/06/2021    INR 1.0 08/20/2015    INR 1.1 02/18/2014    PROTIME 11.0 08/06/2021    PROTIME 11.3 08/20/2015    PROTIME 12.1 02/18/2014     Lab Results   Component Value Date    TSH 0.822 04/26/2022     Lab Results   Component Value Date    LABA1C 5.9 02/15/2014     No results found for: EAG  Lab Results   Component Value Date    CHOL 131 04/27/2022    CHOL 187 04/05/2021    CHOL 206 (H) 11/12/2020     Lab Results   Component Value Date    TRIG 66 04/27/2022    TRIG 85 04/05/2021    TRIG 112 11/12/2020     Lab Results   Component Value Date    HDL 64 04/27/2022    HDL 95 04/19/2022    HDL 88 04/05/2021     Lab Results   Component Value Date    LDLCALC 54 04/27/2022    LDLCALC 71 04/19/2022    [address] 04/05/2021     Lab Results Component Value Date    LABVLDL 13 04/27/2022    LABVLDL 17 04/19/2022    LABVLDL 17 04/05/2021     No results found for: CHOLHDLRATIO  Recent Labs     04/29/22  0517   PROBNP 5,409*       Cardiac Tests:    Telemetry (4/30/2022): SR, rate 90's    1. HFpEF:  ? Echocardiogram (2/15/2014): All cardiac chamber sizes including aortic root size are within normallimits. The left ventricle shows normal wall thicknesses.  Diastolic filling dysfunction stage 1 is seen.  Systolic function is well preserved with ejection fraction estimated at 66%.  No focal wall motion abnormality is seen. The mitral valve shows thickening of the leaflets.  No significant mitral stenosis.  Mitral valve area 3.3 cm2 by pressure half-time with a maximal gradient of 4.6 mmHg.  There is no mitral stenosis.  There is 1+ to at most 2+ mitral regurgitation. Aortic valve appears structurally normal, is sclerotic.  Maximal gradient 17 mmHg.  Mean gradient 9.9.  There is mild aortic stenosis with trace aortic insufficiency present.  Aortic valve area estimated at 1.6 cm2. There is no pulmonic stenosis or insufficiency.  There is 2+ tricuspid regurgitation with pulmonary hypertension at 40 mmHg. There is no significant pericardial effusion or any definite intracavitary mass, or thrombus, or shunt identified on this study. ? Echocardiogram (9/12/2020):  Summary: Ejection fraction is visually estimated at 61%. Overall ejection fraction is normal. There is doppler evidence of stage I diastolic dysfunction. Physiologic and/or trace mitral regurgitation is present. Mild mitral annular calcification. The aortic valve appears moderately sclerotic. Mild aortic regurgitation is noted. Moderate aortic stenosis. Mild tricuspid regurgitation. ?  Lexiscan stress test (2/17/2014): LVEF equals 73%.  Gated wall motion examination was performed in conjunction with cardiac SPECT imaging.  Left ventricular wall motion is fairly uniform with no demonstration of focal or global hypokinesia. Suann Pillar is no demonstration of left ventricular dilatation.  Impression: No fixed or reversible perfusion abnormality involving the left ventricle induced by Lexiscan stress. 2. Aortic stenosis with TAVR ( 2021): Awaiting records at this time. 3. TTE 4/28/22:   Normal left ventricular chamber size. Normal left ventricular systolic function. Visually estimated LVEF is 55-60 %. Mild concentric LVH. No wall motion abnormalities. Grade II diastolic dysfunction with elevated LA pressure. Mildly dilated right ventricle with preserved function. The left atrium is moderately dilated. Normal right atrial size. Mild pulmonary hypertension with a PASP of 42 mm Hg. There is a bioprosthetic aortic valve in place that appears well seated   with acceptable function and gradients. Trace regurgitation present. No comparison study available.        ASSESSMENT:  1. Acute hypercapnic/hypoxic respiratory failure -- most recent SPO2 96 on 11 L HFNC. 2. Acute on chronic HFpEF:  3. Pneumonia -- pulmonary following  4. Altered mental status -- improved  5. CARLOS/on likely CKD.  Cr baseline appears around 1.0 to 1.1, now 1.4 --> 1.5 --> 1.1  6. AS s/p reported TAVR ( 2021), awaiting records. 7. Hypertension, controlled  8. HLD on Lipitor  9. Hypothyroidism on HRT -- on synthroid, normal TSH   10. Anemia -- Hgb 9.9  11. DNR-CCA code status     RECOMMENDATIONS:  1. Results of 4/28/22 echocardiogram outlined above  2. Continue IV lasix for today -- monitor I/O's and BMP (net negative 2.9 L thus far)  3. Continue current medications otherwise (on multiple anti-HTN agents)  4. Care per pulmonary  5. Telemetry reviewed (SR)    Greater than 35 minutes was spent counseling the patient, reviewing the rationale for the above recommendations and reviewing the patient's current medication list, problem list and results of all previously ordered testing.     Talha Seals MD  [facility]) Cardiology

## 2022-04-30 NOTE — PROGRESS NOTES
Hospitalist Progress Note      Synopsis:Patient admitted for shortness of breath   patient is a 80y.o. year old female with a recent TAVR at [facility] in 2021, who was recently seen by her primary care provider with complaint of shortness of breath, and lower extremity edema. Her primary care provider recommended her go to the ER for further evaluation and diuresis. Patient lives with her daughter and up until about 3 weeks ago was very active and able to ambulate well around her home with a walker. Patient's daughter states over the last 3 weeks her shortness of breath has gotten progressively worse. Patient was admitted to telemetry for further monitoring. During the course of her stay and RRT was called due to change in mental status, patient's CODE STATUS was changed to DNR CCA. Pulmonology consulted for BiPAP dependence. A CT of the chest revealed large consolidation suggestive of pneumonia in the right upper lobe posteriorly. Vanco and meropenem was ordered IV. Hospital day 4     Subjective:  Patient seen and examined at bedside. Patient eating lunch on 10 L high flow nasal cannula. Patient states she is feeling better today with no additional concerns or complaints. Stable overnight. No issues reported. Patient seen and examined  Records reviewed. Temp (24hrs), Av.9 °F (36.6 °C), Min:97.3 °F (36.3 °C), Max:98.2 °F (36.8 °C)    DIET: ADULT DIET; Regular; Low Sodium (2 gm)  CODE: Limited    Intake/Output Summary (Last 24 hours) at 2022 0959  Last data filed at 2022 0618  Gross per 24 hour   Intake 720 ml   Output 1350 ml   Net -630 ml       Review of Systems: All bolded are positive; please see HPI  General:  Fever, chills, diaphoresis, fatigue, malaise, night sweats, weight loss  Psychological:  Anxiety, disorientation, hallucinations. ENT:  Epistaxis, headaches, vertigo, visual changes.   Cardiovascular:  Chest pain, irregular heartbeats, palpitations, paroxysmal Oral Daily    [Held by provider] cloNIDine  0.1 mg Oral BID    clopidogrel  75 mg Oral Daily    polycarbophil  625 mg Oral BID    levothyroxine  25 mcg Oral Daily    rOPINIRole  4 mg Oral Nightly    vitamin D3  400 Units Oral Daily    sodium chloride flush  5-40 mL IntraVENous 2 times per day    heparin (porcine)  5,000 Units SubCUTAneous 3 times per day     PRN Meds: guaiFENesin-dextromethorphan, benzonatate, perflutren lipid microspheres, hydrALAZINE, sodium chloride flush, sodium chloride, ondansetron **OR** ondansetron, polyethylene glycol, acetaminophen **OR** acetaminophen, melatonin    Labs:     Recent Labs     04/28/22  1115 04/29/22  0517 04/30/22  0647   WBC 13.5* 11.4 9.6   HGB 9.3* 9.3* 9.9*   HCT 31.6* 31.5* 32.9*    194 214       Recent Labs     04/28/22  0650 04/29/22  0517 04/30/22  0647    139 140   K 4.3 4.2 4.6   CL 94* 95* 94*   CO2 32* 34* 37*   BUN 25* 26* 20   CREATININE 1.4* 1.4* 1.1*   CALCIUM 8.9 9.1 9.2       No results for input(s): PROT, ALB, ALKPHOS, ALT, AST, BILITOT, AMYLASE, LIPASE in the last 72 hours. No results for input(s): INR in the last 72 hours. No results for input(s): Jaswinder Villasenor in the last 72 hours. Chronic labs:    Lab Results   Component Value Date    CHOL 131 04/27/2022    TRIG 66 04/27/2022    HDL 64 04/27/2022    LDLCALC 54 04/27/2022    TSH 0.822 04/26/2022    INR 0.9 08/06/2021    LABA1C 5.9 02/15/2014       Radiology: REVIEWED DAILY    Assessment:  Acute on chronic congestive heart failure  Acute respiratory failure with hypoxia  Pneumonia  Elevated troponin at 44  Leukocytosis (resolving) 11.4  CARLOS creatinine 1.5, 1.4 today  Hypertension  Hyperlipidemia  Heart murmur  Arthritis  Hx of TAVR in oct of 2021    Plan:  Echocardiogram,  Normal left ventricular chamber size. Normal left ventricular systolic function. Visually estimated LVEF is 55-60 %. Mild concentric LVH. Grade II diastolic dysfunction with elevated LA pressure.  Mildly dilated right ventricle with preserved function. The left atrium is moderately dilated. Mild pulmonary hypertension with a PASP of 42 mm Hg  proBNP 5409 on 4-29  Toprol XL 25 mg  Daily weights  BiPAP for nocturnal hours and with naps  Pulmonology consulted  Respiratory culture shows abundant polymorphonuclear leukocytes and moderate Gram positive cocci in pairs  On Vancomycin IV  On Meropenem IV  Strict intake and output  IV Lasix 20 mg BID  Monitor electrolytes, especially K  cardiology consult  Follow-up in congestive heart failure clinic after discharge  Chest x-ray shows persistent right upper and lower lobe alveolar opacity with new left perihilar asymmetric alveolar  compatible with worsening pneumonia  Continue home medications Norvasc 2.5 mg, aspirin 81 mg, Lipitor 20 mg, Catapres 0.1 mg, Plavix 75 mg, Synthroid 25 mg,        DVT Prophylaxis [] Lovenox  [x]  Heparin [] DOAC [] PCDs [] Ambulation    GI Prophylaxis [] PPI  [] H2 Blocker   [] Carafate  [x] Diet/Tube Feeds   Level of care [] Med/Surg  [x] Intermediate  []  ICU   Diet ADULT DIET; Regular; Low Sodium (2 gm)    Family contact []  N/A    [x] At bedside  [] Phone call   Disposition Patient requires continued admission respiratory failure due to pneumonia and CHF   MDM [x] Low    [] Moderate  []   High       Discharge Plan: Discharged to SNF when clinically stable  +++++++++++++++++++++++++++++++++++++++++++++++++  MICKI Osuna Physician - [address], [address]  +++++++++++++++++++++++++++++++++++++++++++++++++  NOTE: This report was transcribed using voice recognition software. Every effort was made to ensure accuracy; however, inadvertent computerized transcription errors may be present.

## 2022-04-30 NOTE — PATIENT CARE CONFERENCE
[facility] Quality Flow/Interdisciplinary Rounds Progress Note        Quality Flow Rounds held on April 30, 2022    Disciplines Attending:  Bedside Nurse, ,  and Nursing Unit Leadership    Barry Martin was admitted on 4/26/2022  2:32 PM    Anticipated Discharge Date:  Expected Discharge Date: 05/03/22    Disposition:    Avi Score:  Avi Scale Score: 17    Readmission Risk              Risk of Unplanned Readmission:  16           Discussed patient goal for the day, patient clinical progression, and barriers to discharge.   The following Goal(s) of the Day/Commitment(s) have been identified:  Diagnostics - Report Results      Kerri Alvarez RN  April 30, 2022

## 2022-05-01 LAB
ANION GAP SERPL CALCULATED.3IONS-SCNC: 10 MMOL/L (ref 7–16)
BUN BLDV-MCNC: 17 MG/DL (ref 6–23)
CALCIUM SERPL-MCNC: 9.1 MG/DL (ref 8.6–10.2)
CHLORIDE BLD-SCNC: 91 MMOL/L (ref 98–107)
CO2: 39 MMOL/L (ref 22–29)
CREAT SERPL-MCNC: 1 MG/DL (ref 0.5–1)
GFR AFRICAN AMERICAN: >60
GFR NON-AFRICAN AMERICAN: 52 ML/MIN/1.73
GLUCOSE BLD-MCNC: 118 MG/DL (ref 74–99)
HCT VFR BLD CALC: 32.7 % (ref 34–48)
HEMOGLOBIN: 9.8 G/DL (ref 11.5–15.5)
MAGNESIUM: 2 MG/DL (ref 1.6–2.6)
MCH RBC QN AUTO: 26.1 PG (ref 26–35)
MCHC RBC AUTO-ENTMCNC: 30 % (ref 32–34.5)
MCV RBC AUTO: 87.2 FL (ref 80–99.9)
PDW BLD-RTO: 17.5 FL (ref 11.5–15)
PLATELET # BLD: 205 E9/L (ref 130–450)
PMV BLD AUTO: 11 FL (ref 7–12)
POTASSIUM SERPL-SCNC: 4.2 MMOL/L (ref 3.5–5)
PRO-BNP: 4489 PG/ML (ref 0–450)
RBC # BLD: 3.75 E12/L (ref 3.5–5.5)
SODIUM BLD-SCNC: 140 MMOL/L (ref 132–146)
VANCOMYCIN RANDOM: 15 MCG/ML (ref 5–40)
WBC # BLD: 9.6 E9/L (ref 4.5–11.5)

## 2022-05-01 PROCEDURE — 99233 SBSQ HOSP IP/OBS HIGH 50: CPT | Performed by: INTERNAL MEDICINE

## 2022-05-01 PROCEDURE — 2580000003 HC RX 258: Performed by: INTERNAL MEDICINE

## 2022-05-01 PROCEDURE — 94640 AIRWAY INHALATION TREATMENT: CPT

## 2022-05-01 PROCEDURE — 6360000002 HC RX W HCPCS: Performed by: INTERNAL MEDICINE

## 2022-05-01 PROCEDURE — 2700000000 HC OXYGEN THERAPY PER DAY

## 2022-05-01 PROCEDURE — 80048 BASIC METABOLIC PNL TOTAL CA: CPT

## 2022-05-01 PROCEDURE — 83880 ASSAY OF NATRIURETIC PEPTIDE: CPT

## 2022-05-01 PROCEDURE — 6360000002 HC RX W HCPCS

## 2022-05-01 PROCEDURE — 36415 COLL VENOUS BLD VENIPUNCTURE: CPT

## 2022-05-01 PROCEDURE — 2140000000 HC CCU INTERMEDIATE R&B

## 2022-05-01 PROCEDURE — 85027 COMPLETE CBC AUTOMATED: CPT

## 2022-05-01 PROCEDURE — 6370000000 HC RX 637 (ALT 250 FOR IP): Performed by: INTERNAL MEDICINE

## 2022-05-01 PROCEDURE — 6370000000 HC RX 637 (ALT 250 FOR IP)

## 2022-05-01 PROCEDURE — 94660 CPAP INITIATION&MGMT: CPT

## 2022-05-01 PROCEDURE — 80202 ASSAY OF VANCOMYCIN: CPT

## 2022-05-01 PROCEDURE — 2580000003 HC RX 258

## 2022-05-01 PROCEDURE — 83735 ASSAY OF MAGNESIUM: CPT

## 2022-05-01 PROCEDURE — 6370000000 HC RX 637 (ALT 250 FOR IP): Performed by: FAMILY MEDICINE

## 2022-05-01 RX ORDER — FUROSEMIDE 10 MG/ML
20 INJECTION INTRAMUSCULAR; INTRAVENOUS 2 TIMES DAILY
Status: DISCONTINUED | OUTPATIENT
Start: 2022-05-01 | End: 2022-05-02

## 2022-05-01 RX ADMIN — MEROPENEM 1000 MG: 1 INJECTION, POWDER, FOR SOLUTION INTRAVENOUS at 11:55

## 2022-05-01 RX ADMIN — Medication 10 ML: at 20:44

## 2022-05-01 RX ADMIN — ROPINIROLE HYDROCHLORIDE 4 MG: 2 TABLET, FILM COATED ORAL at 20:43

## 2022-05-01 RX ADMIN — CALCIUM POLYCARBOPHIL 625 MG: 625 TABLET, FILM COATED ORAL at 08:13

## 2022-05-01 RX ADMIN — HEPARIN SODIUM 5000 UNITS: 10000 INJECTION INTRAVENOUS; SUBCUTANEOUS at 13:57

## 2022-05-01 RX ADMIN — HEPARIN SODIUM 5000 UNITS: 10000 INJECTION INTRAVENOUS; SUBCUTANEOUS at 05:05

## 2022-05-01 RX ADMIN — CLOPIDOGREL BISULFATE 75 MG: 75 TABLET ORAL at 08:13

## 2022-05-01 RX ADMIN — LOSARTAN POTASSIUM 25 MG: 25 TABLET, FILM COATED ORAL at 08:14

## 2022-05-01 RX ADMIN — CHOLECALCIFEROL TAB 10 MCG (400 UNIT) 400 UNITS: 10 TAB at 17:13

## 2022-05-01 RX ADMIN — IPRATROPIUM BROMIDE AND ALBUTEROL SULFATE 1 AMPULE: .5; 2.5 SOLUTION RESPIRATORY (INHALATION) at 20:50

## 2022-05-01 RX ADMIN — FUROSEMIDE 20 MG: 10 INJECTION, SOLUTION INTRAMUSCULAR; INTRAVENOUS at 08:13

## 2022-05-01 RX ADMIN — POTASSIUM BICARBONATE 20 MEQ: 782 TABLET, EFFERVESCENT ORAL at 08:13

## 2022-05-01 RX ADMIN — ASPIRIN 81 MG: 81 TABLET, COATED ORAL at 08:13

## 2022-05-01 RX ADMIN — IPRATROPIUM BROMIDE AND ALBUTEROL SULFATE 1 AMPULE: .5; 2.5 SOLUTION RESPIRATORY (INHALATION) at 12:20

## 2022-05-01 RX ADMIN — PANTOPRAZOLE SODIUM 40 MG: 40 TABLET, DELAYED RELEASE ORAL at 05:03

## 2022-05-01 RX ADMIN — Medication 10 ML: at 08:14

## 2022-05-01 RX ADMIN — IPRATROPIUM BROMIDE AND ALBUTEROL SULFATE 1 AMPULE: .5; 2.5 SOLUTION RESPIRATORY (INHALATION) at 16:09

## 2022-05-01 RX ADMIN — METOPROLOL SUCCINATE 25 MG: 25 TABLET, EXTENDED RELEASE ORAL at 08:14

## 2022-05-01 RX ADMIN — CALCIUM POLYCARBOPHIL 625 MG: 625 TABLET, FILM COATED ORAL at 20:43

## 2022-05-01 RX ADMIN — AMLODIPINE BESYLATE 10 MG: 10 TABLET ORAL at 08:14

## 2022-05-01 RX ADMIN — FUROSEMIDE 20 MG: 10 INJECTION, SOLUTION INTRAMUSCULAR; INTRAVENOUS at 20:43

## 2022-05-01 RX ADMIN — ATORVASTATIN CALCIUM 20 MG: 20 TABLET, FILM COATED ORAL at 08:14

## 2022-05-01 RX ADMIN — IPRATROPIUM BROMIDE AND ALBUTEROL SULFATE 1 AMPULE: .5; 2.5 SOLUTION RESPIRATORY (INHALATION) at 09:11

## 2022-05-01 RX ADMIN — VANCOMYCIN HYDROCHLORIDE 750 MG: 10 INJECTION, POWDER, LYOPHILIZED, FOR SOLUTION INTRAVENOUS at 15:20

## 2022-05-01 RX ADMIN — HEPARIN SODIUM 5000 UNITS: 10000 INJECTION INTRAVENOUS; SUBCUTANEOUS at 22:28

## 2022-05-01 RX ADMIN — LEVOTHYROXINE SODIUM 25 MCG: 0.03 TABLET ORAL at 05:03

## 2022-05-01 ASSESSMENT — PAIN SCALES - GENERAL
PAINLEVEL_OUTOF10: 0
PAINLEVEL_OUTOF10: 0

## 2022-05-01 NOTE — PROGRESS NOTES
Hospitalist Progress Note      Synopsis:Patient admitted for shortness of breath   patient is a 80y.o. year old female with a recent TAVR at [facility] in 2021, who was recently seen by her primary care provider with complaint of shortness of breath, and lower extremity edema. Her primary care provider recommended her go to the ER for further evaluation and diuresis. Patient lives with her daughter and up until about 3 weeks ago was very active and able to ambulate well around her home with a walker. Patient's daughter states over the last 3 weeks her shortness of breath has gotten progressively worse. Patient was admitted to telemetry for further monitoring. During the course of her stay and RRT was called due to change in mental status, patient's CODE STATUS was changed to DNR CCA. Pulmonology consulted for BiPAP dependence. A CT of the chest revealed large consolidation suggestive of pneumonia in the right upper lobe posteriorly. Vanco and meropenem was ordered IV. Chest x-ray from  shows persistent right upper and lower lobe alveolar opacity with new left perihilar asymmetric alveolar  compatible with worsening pneumonia. Chest x-ray from  shows no interval change and extensive multifocal pneumonia within the right lung, more prominent within the right upper lobe, very minimal partial interval clearing of the left perihilar pneumonia, cardiomegaly. Hospital day 5     Subjective:  Patient seen and examined at bedside on high flow nasal cannula. Patient with a strong cough, patient states Yelena Casey is not feeling well\". Though she is able to enunciate conversation more today than previous days. Stable overnight. No issues reported. Patient seen and examined  Records reviewed. Temp (24hrs), Av °F (36.1 °C), Min:96.5 °F (35.8 °C), Max:97.3 °F (36.3 °C)    DIET: ADULT DIET; Regular;  Low Sodium (2 gm)  CODE: Limited    Intake/Output Summary (Last 24 hours) at 2022 0739  Last data filed at 5/1/2022 0503  Gross per 24 hour   Intake 900 ml   Output 2450 ml   Net -1550 ml       Review of Systems:  General:  Fever, chills, diaphoresis, fatigue, malaise, night sweats, weight loss  Psychological:  Anxiety, disorientation, hallucinations. ENT:  Epistaxis, headaches, vertigo, visual changes. Cardiovascular:  Chest pain, irregular heartbeats, palpitations, paroxysmal nocturnal dyspnea. Respiratory:  Shortness of breath, coughing, sputum production, hemoptysis, wheezing, orthopnea. Gastrointestinal:  Nausea, vomiting, diarrhea, heartburn, constipation, abdominal pain, hematemesis, hematochezia, melena, acholic stools  Genito-Urinary:  Dysuria, urgency, frequency, hematuria  Musculoskeletal:  Joint pain, joint stiffness, joint swelling, muscle pain  Neurology:  Headache, focal neurological deficits, weakness, numbness, paresthesia  Derm:  Rashes, ulcers, excoriations, bruising  Extremities:  Decreased ROM, peripheral edema, mottling    Objective:    BP (!) 124/43   Pulse 73   Temp 96.5 °F (35.8 °C) (Oral)   Resp 26   Ht 4' 11\" (1.499 m)   Wt 141 lb 1.5 oz (64 kg)   SpO2 96%   BMI 28.50 kg/m²     General appearance: Elderly female, no apparent distress, appears stated age and cooperative. Respiratory: Rales auscultated throughout lung fields anteriorly and posteriorly, bilaterally. Tachypneic and labored respiratory effort. Strong cough. Cardiovascular:  RRR. S1, S2 without MRG. PV: Pulses palpable. 1 + pitting edema in bilateral lower extremities  Abdomen: Soft, non-tender, non-distended. +BS  Musculoskeletal: No obvious deformities. Skin: Normal skin color pale. Neurologic:  Grossly non-focal. Awake, alert to voice, not following commands. Psychiatric: Alert and oriented x4, thought content appropriate.      Medications:  REVIEWED DAILY    Infusion Medications    sodium chloride       Scheduled Medications    vancomycin (VANCOCIN) intermittent dosing (placeholder)   Other RX Placeholder    furosemide  20 mg IntraVENous TID    meropenem  1,000 mg IntraVENous Q12H    losartan  25 mg Oral Daily    metoprolol succinate  25 mg Oral Daily    pantoprazole  40 mg Oral QAM AC    potassium bicarb-citric acid  20 mEq Oral Daily    ipratropium-albuterol  1 ampule Inhalation Q4H WA    amLODIPine  10 mg Oral Daily    aspirin  81 mg Oral Daily    atorvastatin  20 mg Oral Daily    [Held by provider] cloNIDine  0.1 mg Oral BID    clopidogrel  75 mg Oral Daily    polycarbophil  625 mg Oral BID    levothyroxine  25 mcg Oral Daily    rOPINIRole  4 mg Oral Nightly    vitamin D3  400 Units Oral Daily    sodium chloride flush  5-40 mL IntraVENous 2 times per day    heparin (porcine)  5,000 Units SubCUTAneous 3 times per day     PRN Meds: guaiFENesin-dextromethorphan, benzonatate, perflutren lipid microspheres, hydrALAZINE, sodium chloride flush, sodium chloride, ondansetron **OR** ondansetron, polyethylene glycol, acetaminophen **OR** acetaminophen, melatonin    Labs:     Recent Labs     04/28/22  1115 04/29/22  0517 04/30/22  0647   WBC 13.5* 11.4 9.6   HGB 9.3* 9.3* 9.9*   HCT 31.6* 31.5* 32.9*    194 214       Recent Labs     04/29/22  0517 04/30/22  0647    140   K 4.2 4.6   CL 95* 94*   CO2 34* 37*   BUN 26* 20   CREATININE 1.4* 1.1*   CALCIUM 9.1 9.2       No results for input(s): PROT, ALB, ALKPHOS, ALT, AST, BILITOT, AMYLASE, LIPASE in the last 72 hours. No results for input(s): INR in the last 72 hours. No results for input(s): Amie Klarissa in the last 72 hours.     Chronic labs:    Lab Results   Component Value Date    CHOL 131 04/27/2022    TRIG 66 04/27/2022    HDL 64 04/27/2022    LDLCALC 54 04/27/2022    TSH 0.822 04/26/2022    INR 0.9 08/06/2021    LABA1C 5.9 02/15/2014       Radiology: REVIEWED DAILY    Assessment:  Acute on chronic congestive heart failure  Acute respiratory failure with hypoxia  Pneumonia  Elevated troponin at 44  Leukocytosis (resolving) 11.4  CARLOS creatinine 1.5, 1.4 today  Hypertension  Hyperlipidemia  Heart murmur  Arthritis  Hx of TAVR in oct of 2021    Plan:  Echocardiogram,  LVEF is 55-60 %. Mild concentric LVH. Grade II diastolic dysfunction with elevated LA pressure. Mildly dilated right ventricle with preserved function. The left atrium is moderately dilated. Mild pulmonary hypertension with a PASP of 42 mm Hg  proBNP 5409 on 4-29, proBNP 4489  Toprol XL 25 mg  Daily weights  BiPAP for nocturnal hours and with naps  Pulmonology consulted  Respiratory culture shows abundant polymorphonuclear leukocytes and moderate Gram positive cocci in pairs  On Vancomycin IV  On Meropenem IV  Strict intake and output  IV Lasix 20 mg   Monitor electrolytes, especially K  cardiology consult  Follow-up in congestive heart failure clinic after discharge  Continue home medications Norvasc 2.5 mg, aspirin 81 mg, Lipitor 20 mg, Catapres 0.1 mg, Plavix 75 mg, Synthroid 25 mg,         DVT Prophylaxis [] Lovenox  [x]  Heparin [] DOAC [] PCDs [] Ambulation    GI Prophylaxis [] PPI  [] H2 Blocker   [] Carafate  [x] Diet/Tube Feeds   Level of care [] Med/Surg  [x] Intermediate  []  ICU   Diet ADULT DIET; Regular; Low Sodium (2 gm)    Family contact []  N/A    [x] At bedside  [] Phone call   Disposition Patient requires continued admission to respiratory failure related to significant pneumonia and CHF   MDM [] Low    [x] Moderate  []   High       Discharge Plan: Discharged to SNF when clinically stable    +++++++++++++++++++++++++++++++++++++++++++++++++  MICKI Grover  [facility] Physician - [address] [place] [address]  +++++++++++++++++++++++++++++++++++++++++++++++++  NOTE: This report was transcribed using voice recognition software. Every effort was made to ensure accuracy; however, inadvertent computerized transcription errors may be present.

## 2022-05-01 NOTE — PROGRESS NOTES
INPATIENT CARDIOLOGY FOLLOW-UP    Name: Edin Evans    Age: 80 y.o. Date of Admission: 4/26/2022  2:32 PM    Date of Service: 5/1/2022    Chief Complaint: Follow-up for acute hypoxic and hypercapnic respiratory failure, acute on chronic HFpEF    Interim History:  No new overnight cardiac complaints. Currently with no complaints of CP, palpitations, dizziness, or lightheadedness. +SOB / no respiratory distress (SOB improved since admission pr patient). SR on telemetry. SPO2 99 on 10 L HFNC. I/O's 4.5 L negative this admission so far.     Review of Systems:   Cardiac: As per HPI  General: No fever, chills  Pulmonary: As per HPI  HEENT: No visual disturbances, difficult swallowing  GI: No nausea, vomiting  : No dysuria, hematuria  Endocrine: +hypothyroidism, no DM  Musculoskeletal: AVELAR x 4, no focal motor deficits  Skin: Intact, no rashes  Neuro: No headache, seizures  Psych: Currently with no depression, anxiety    Problem List:  Patient Active Problem List   Diagnosis    Hip pain    Aseptic loosening of prosthetic hip (HCC)    DJD (degenerative joint disease) of knee    Knee pain    HTN (hypertension)    HLD (hyperlipidemia)    Closed fracture of left femur (HCC)    Closed subtrochanteric fracture of left femur with delayed healing    Near syncope    Chronic congestive heart failure (HCC)    Acute hypoxemic respiratory failure (HCC)    History of transcatheter aortic valve replacement (TAVR)       Current Medications:    Current Facility-Administered Medications:     vancomycin (VANCOCIN) 750 mg in dextrose 5 % 250 mL IVPB, 750 mg, IntraVENous, Q24H, Shazia Hernández DO    furosemide (LASIX) injection 20 mg, 20 mg, IntraVENous, TID, Melvi Roper MD, 20 mg at 05/01/22 0813    meropenem (MERREM) 1,000 mg in sodium chloride 0.9 % 100 mL IVPB (mini-bag), 1,000 mg, IntraVENous, Q12H, Herberth Garrett DO, Stopped at 05/01/22 0323    guaiFENesin-dextromethorphan (ROBITUSSIN DM) 100-10 MG/5ML syrup 5 mL, 5 mL, Oral, Q4H PRN, Gabeth Geralds, DO    benzonatate (TESSALON) capsule 100 mg, 100 mg, Oral, TID PRN, Gaila Geralds, DO, 100 mg at 04/30/22 2009    losartan (COZAAR) tablet 25 mg, 25 mg, Oral, Daily, Gaila Geralds, DO, 25 mg at 05/01/22 2007    metoprolol succinate (TOPROL XL) extended release tablet 25 mg, 25 mg, Oral, Daily, Gaila Geralds, DO, 25 mg at 05/01/22 0814    pantoprazole (PROTONIX) tablet 40 mg, 40 mg, Oral, QAM AC, Gaila Geralds, DO, 40 mg at 05/01/22 0503    potassium bicarb-citric acid (EFFER-K) effervescent tablet 20 mEq, 20 mEq, Oral, Daily, Gaila Geralds, DO, 20 mEq at 05/01/22 0813    perflutren lipid microspheres (DEFINITY) injection 1.65 mg, 1.5 mL, IntraVENous, ONCE PRN, [facility] SHAHBAZ Montalvo    hydrALAZINE (APRESOLINE) injection 10 mg, 10 mg, IntraVENous, Q6H PRN, [facility] SHAHBAZ Montalvo    ipratropium-albuterol (DUONEB) nebulizer solution 1 ampule, 1 ampule, Inhalation, Q4H WA, Shazia Hernández, DO, 1 ampule at 05/01/22 0911    amLODIPine (NORVASC) tablet 10 mg, 10 mg, Oral, Daily, MICKI Hadley - CNP, 10 mg at 05/01/22 4309    aspirin EC tablet 81 mg, 81 mg, Oral, Daily, Jordon Earl APRN - CNP, 81 mg at 05/01/22 0813    atorvastatin (LIPITOR) tablet 20 mg, 20 mg, Oral, Daily, Jordon Earl APRLOWELL - CNP, 20 mg at 05/01/22 6092    [Held by provider] cloNIDine (CATAPRES) tablet 0.1 mg, 0.1 mg, Oral, BID, MICKI Hadley - CNP, 0.1 mg at 04/26/22 2316    clopidogrel (PLAVIX) tablet 75 mg, 75 mg, Oral, Daily, MICKI Hadley CNP, 75 mg at 05/01/22 0813    polycarbophil (FIBERCON) tablet 625 mg, 625 mg, Oral, BID, MICKI Hadley CNP, 625 mg at 05/01/22 0813    levothyroxine (SYNTHROID) tablet 25 mcg, 25 mcg, Oral, Daily, MICKI Hadley CNP, 25 mcg at 05/01/22 0503    rOPINIRole (REQUIP) tablet 4 mg, 4 mg, Oral, Nightly, MICKI Hadley CNP, 4 mg at 04/30/22 2009    vitamin D3 (CHOLECALCIFEROL) tablet 400 Units, 400 Units, Oral, Daily, Doris Frank, APRN - CNP, 400 Units at 04/30/22 1650    sodium chloride flush 0.9 % injection 5-40 mL, 5-40 mL, IntraVENous, 2 times per day, Doris Frank, APRN - CNP, 10 mL at 05/01/22 0508    sodium chloride flush 0.9 % injection 5-40 mL, 5-40 mL, IntraVENous, PRN, Doris Frank, APRN - CNP    0.9 % sodium chloride infusion, , IntraVENous, PRN, Doris Frank, APRN - CNP    ondansetron (ZOFRAN-ODT) disintegrating tablet 4 mg, 4 mg, Oral, Q8H PRN **OR** ondansetron (ZOFRAN) injection 4 mg, 4 mg, IntraVENous, Q6H PRN, Doris Frank, APRN - CNP    polyethylene glycol (GLYCOLAX) packet 17 g, 17 g, Oral, Daily PRN, Doris Frank, APRN - CNP    acetaminophen (TYLENOL) tablet 650 mg, 650 mg, Oral, Q6H PRN **OR** acetaminophen (TYLENOL) suppository 650 mg, 650 mg, Rectal, Q6H PRN, Doris Frank, APRN - CNP    heparin (porcine) injection 5,000 Units, 5,000 Units, SubCUTAneous, 3 times per day, Doris Frank, APRN - CNP, 5,000 Units at 05/01/22 0505    melatonin tablet 3 mg, 3 mg, Oral, Nightly PRN, Radha Nora, DO, 3 mg at 04/30/22 2009    Physical Exam:  BP (!) 138/46   Pulse 75   Temp 96.8 °F (36 °C) (Temporal)   Resp 24   Ht 4' 11\" (1.499 m)   Wt 141 lb 1.5 oz (64 kg)   SpO2 99%   BMI 28.50 kg/m²   Wt Readings from Last 3 Encounters:   05/01/22 141 lb 1.5 oz (64 kg)   09/12/20 133 lb 0.1 oz (60.3 kg)   05/25/16 134 lb (60.8 kg)     Appearance: Awake, alert and oriented x 3, no acute respiratory distress  Skin: Intact, no rash  Head: Normocephalic, atraumatic  Eyes: EOMI, no conjunctival erythema  ENMT: No pharyngeal erythema, MMM, no rhinorrhea, no dentures  Neck: Supple, no carotid bruits  Lungs: Decreased BS B/L, no wheezing  Cardiac: Regular rate and rhythm, no murmurs apparent  Abdomen: Soft, nontender, +bowel sounds  Extremities: Moves all extremities x 4, no lower extremity edema  Neurologic: No focal motor deficits apparent, normal mood and affect    Intake/Output:    Intake/Output Summary (Last 24 hours) at 5/1/2022 1101  Last data filed at 5/1/2022 1001  Gross per 24 hour   Intake 1020 ml   Output 2450 ml   Net -1430 ml     I/O this shift:  In: 240 [P.O.:240]  Out: -     Laboratory Tests:  Recent Labs     04/29/22 0517 04/30/22 0647 05/01/22  0714    140 140   K 4.2 4.6 4.2   CL 95* 94* 91*   CO2 34* 37* 39*   BUN 26* 20 17   CREATININE 1.4* 1.1* 1.0   GLUCOSE 96 111* 118*   CALCIUM 9.1 9.2 9.1     Lab Results   Component Value Date    MG 2.0 05/01/2022     No results for input(s): ALKPHOS, ALT, AST, PROT, BILITOT, BILIDIR, LABALBU in the last 72 hours.   Recent Labs     04/29/22 0517 04/30/22 0647 05/01/22  0714   WBC 11.4 9.6 9.6   RBC 3.60 3.82 3.75   HGB 9.3* 9.9* 9.8*   HCT 31.5* 32.9* 32.7*   MCV 87.5 86.1 87.2   MCH 25.8* 25.9* 26.1   MCHC 29.5* 30.1* 30.0*   RDW 18.2* 17.6* 17.5*    214 205   MPV 10.8 11.1 11.0     Lab Results   Component Value Date    CKTOTAL 115 02/14/2014    CKMB 3.1 02/14/2014    TROPONINI <0.01 09/11/2020    TROPONINI <0.01 02/14/2014     Lab Results   Component Value Date    INR 0.9 08/06/2021    INR 1.0 08/20/2015    INR 1.1 02/18/2014    PROTIME 11.0 08/06/2021    PROTIME 11.3 08/20/2015    PROTIME 12.1 02/18/2014     Lab Results   Component Value Date    TSH 0.822 04/26/2022     Lab Results   Component Value Date    LABA1C 5.9 02/15/2014     No results found for: EAG  Lab Results   Component Value Date    CHOL 131 04/27/2022    CHOL 187 04/05/2021    CHOL 206 (H) 11/12/2020     Lab Results   Component Value Date    TRIG 66 04/27/2022    TRIG 85 04/05/2021    TRIG 112 11/12/2020     Lab Results   Component Value Date    HDL 64 04/27/2022    HDL 95 04/19/2022    HDL 88 04/05/2021     Lab Results   Component Value Date    LDLCALC 54 04/27/2022    LDLCALC 71 04/19/2022    LDLCALC 82 04/05/2021     Lab Results   Component Value Date    LABVLDL 13 04/27/2022    LABVLDL 17 04/19/2022    LABVLDL 17 04/05/2021     No results found for: [address]     04/29/22  0517 05/01/22  0714   PROBNP 5,409* 4,489*       Cardiac Tests:    Telemetry (5/1/2022): SR, rate 70's    1. HFpEF:  ? Echocardiogram (2/15/2014): All cardiac chamber sizes including aortic root size are within normallimits. The left ventricle shows normal wall thicknesses.  Diastolic filling dysfunction stage 1 is seen.  Systolic function is well preserved with ejection fraction estimated at 66%.  No focal wall motion abnormality is seen. The mitral valve shows thickening of the leaflets.  No significant mitral stenosis.  Mitral valve area 3.3 cm2 by pressure half-time with a maximal gradient of 4.6 mmHg.  There is no mitral stenosis.  There is 1+ to at most 2+ mitral regurgitation. Aortic valve appears structurally normal, is sclerotic.  Maximal gradient 17 mmHg.  Mean gradient 9.9.  There is mild aortic stenosis with trace aortic insufficiency present.  Aortic valve area estimated at 1.6 cm2. There is no pulmonic stenosis or insufficiency.  There is 2+ tricuspid regurgitation with pulmonary hypertension at 40 mmHg. There is no significant pericardial effusion or any definite intracavitary mass, or thrombus, or shunt identified on this study. ? Echocardiogram (9/12/2020):  Summary: Ejection fraction is visually estimated at 61%. Overall ejection fraction is normal. There is doppler evidence of stage I diastolic dysfunction. Physiologic and/or trace mitral regurgitation is present. Mild mitral annular calcification. The aortic valve appears moderately sclerotic. Mild aortic regurgitation is noted. Moderate aortic stenosis. Mild tricuspid regurgitation. ?  Lexiscan stress test (2/17/2014): LVEF equals 73%.  Gated wall motion examination was performed in conjunction with cardiac SPECT imaging.  Left ventricular wall motion is fairly uniform with no demonstration of focal or global hypokinesia. Kennth Holden is no demonstration of left ventricular dilatation.  Impression: No fixed or reversible perfusion abnormality involving the left ventricle induced by Lexiscan stress. 2. Aortic stenosis with TAVR ( 2021): Awaiting records at this time. 3. TTE 4/28/22:   Normal left ventricular chamber size. Normal left ventricular systolic function. Visually estimated LVEF is 55-60 %. Mild concentric LVH. No wall motion abnormalities. Grade II diastolic dysfunction with elevated LA pressure. Mildly dilated right ventricle with preserved function. The left atrium is moderately dilated. Normal right atrial size. Mild pulmonary hypertension with a PASP of 42 mm Hg. There is a bioprosthetic aortic valve in place that appears well seated   with acceptable function and gradients. Trace regurgitation present. No comparison study available.        ASSESSMENT:  1. Acute hypercapnic/hypoxic respiratory failure -- most recent SPO2 99 on 10 L HFNC. 2. Acute on chronic HFpEF:  3. Pneumonia -- pulmonary following  4. Altered mental status -- improved  5. CARLOS/on likely CKD.  Cr baseline appears around 1.0 to 1.1, now 1.4 --> 1.5 --> 1.1 --> 1.0  6. AS s/p reported TAVR ( 2021), awaiting records. 7. Hypertension, controlled  8. HLD on Lipitor  9. Hypothyroidism on HRT -- on synthroid, normal TSH   10. Anemia -- Hgb 9.3 --> 9.9 --> 9.8  11. DNR-CCA code status     RECOMMENDATIONS:  1. Results of 4/28/22 echocardiogram outlined above  2. Continue IV lasix for today (will switch from TID to BID dosing) -- monitor I/O's and BMP (net negative 4.5 L thus far, renal function improving)  3. Continue current medications otherwise (on multiple anti-HTN agents)  4. Care per pulmonary  5. Telemetry reviewed (SR)    Greater than 35 minutes was spent counseling the patient, reviewing the rationale for the above recommendations and reviewing the patient's current medication list, problem list and results of all previously ordered testing.     Alexandra Hilliard MD  [facility]) Cardiology

## 2022-05-01 NOTE — PROGRESS NOTES
Pharmacy Consultation Note  (Antibiotic Dosing and Monitoring)    Initial consult date: 4/27/22  Consulting physician/provider: Dr. Brandie Zazueta  Drug: Vancomycin  Indication: PNA    Age/  Gender Height Weight IBW  Allergy Information   89 y.o./female 4' 11\" (149.9 cm) 145 lb 4.5 oz (65.9 kg)     Ideal body weight: 48.8 kg (107 lb 8.4 oz)  Adjusted ideal body weight: 54.9 kg (120 lb 15.2 oz)   Elemental sulfur, Ibuprofen, and Penicillins      Renal Function:  Recent Labs     04/29/22  0517 04/30/22  0647 05/01/22  0714   BUN 26* 20 17   CREATININE 1.4* 1.1* 1.0       Intake/Output Summary (Last 24 hours) at 5/1/2022 1046  Last data filed at 5/1/2022 1001  Gross per 24 hour   Intake 1020 ml   Output 2450 ml   Net -1430 ml       Vancomycin Monitoring:  Trough:  No results for input(s): VANCOTROUGH in the last 72 hours. Random:    Recent Labs     04/29/22  0517 04/30/22  0647 05/01/22  0714   VANCORANDOM 8.0 13.9 15.0       Recent vancomycin administrations                   vancomycin (VANCOCIN) 750 mg in dextrose 5 % 250 mL IVPB (mg) 750 mg New Bag 04/30/22 1819    vancomycin 1000 mg IVPB in 250 mL D5W addavial (mg) 1,000 mg New Bag 04/29/22 1520              Assessment:  · Patient is a 80 y.o. female who has been initiated on vancomycin  Estimated Creatinine Clearance: 32 mL/min (based on SCr of 1 mg/dL). · To dose vancomycin, pharmacy will be utilizing dosing based off of levels because of patient's renal impairment/insufficiency   · 4/27: Current sCr above baseline up to 1.5 from previous baseline ~1  · 4/28: WBC, Scr 1.4, MRSA nares swab pending  · 4/29: WBC 11.4, Scr 1.4, vancomycin level at 05:17 = 8 mcg/mL  · 4/30: WBC 9.6, Scr 1.1, vancomycin level at 06:47 = 13.9 mcg/mL  · 5/1: sCr stable, 12h post dose level 15 demonstrating steady vancomycin clerance    Plan:  · Schedule vancomycin 750 mg Q24h. Predicted AUC/MARITO of 454 and trough of 14.6 at steady state.   · Vancomycin level as needed  · Will continue to monitor renal function     Luci Velazco, PharmD, [facility] 5/1/2022 10:48 AM  Pharmacy Clinical Specialist, Emergency Medicine  [phone]

## 2022-05-01 NOTE — PATIENT CARE CONFERENCE
[facility] Quality Flow/Interdisciplinary Rounds Progress Note        Quality Flow Rounds held on May 1, 2022    Disciplines Attending:  Bedside Nurse, ,  and Nursing Unit Leadership    Iraj Wesley was admitted on 4/26/2022  2:32 PM    Anticipated Discharge Date:  Expected Discharge Date: 05/03/22    Disposition:    Avi Score:  Avi Scale Score: 17    Readmission Risk              Risk of Unplanned Readmission:  15           Discussed patient goal for the day, patient clinical progression, and barriers to discharge.   The following Goal(s) of the Day/Commitment(s) have been identified:  Diagnostics - Report Results      Alicia Figueroa RN  May 1, 2022

## 2022-05-01 NOTE — PROGRESS NOTES
[place]  Department of Pulmonary, Critical Care and Sleep Medicine  [address] [facility]  Department of Internal Medicine  Progress Note    SUBJECTIVE:    Patient seen and examined, currently on 9L NC.     OBJECTIVE:  Vitals:    05/01/22 0911 05/01/22 1220 05/01/22 1449 05/01/22 1610   BP:   (!) 132/59    Pulse:   78    Resp: 24 23 22    Temp:   97.6 °F (36.4 °C)    TempSrc:   Temporal    SpO2: 99% 99% 99% 98%   Weight:       Height:         Constitutional: Alert,  EENT: EOMI YAEL. Mucous membranes dry  Neck: No thyromegaly. Trachea was midline. Respiratory: Scattered rales bilaterally mid to base  Cardiovascular: Regular, No murmur. No rubs. Pulses:  Equal bilaterally. Abdomen: Soft without organomegaly. No rebound, rigidity. No guarding. Lymphatic: No lymphadenopathy. Musculoskeletal: Without weakness or gross deficits  Extremities:  No lower extremity edema. Reflexes appear adequate. Skin:  Warm and dry. No skin rashes. Neurological/Psychiatric: No acute psychosis. Cranial nerves are intact. DATA:    Monitor Strips:  Reviewed & discusses with technical team. No changes noted. RADIOLOGY:  Chest x-ray reviewed. Patient significantly rotated on film.   Overall appears grossly stable    CBC with Differential:    Lab Results   Component Value Date    WBC 9.6 05/01/2022    RBC 3.75 05/01/2022    HGB 9.8 05/01/2022    HCT 32.7 05/01/2022     05/01/2022    MCV 87.2 05/01/2022    MCH 26.1 05/01/2022    MCHC 30.0 05/01/2022    RDW 17.5 05/01/2022    SEGSPCT 62 02/18/2014    LYMPHOPCT 6.2 04/27/2022    MONOPCT 14.1 04/27/2022    EOSPCT 4 10/11/2010    BASOPCT 0.2 04/27/2022    MONOSABS 2.07 04/27/2022    LYMPHSABS 0.91 04/27/2022    EOSABS 0.04 04/27/2022    BASOSABS 0.03 04/27/2022     CMP:    Lab Results   Component Value Date     05/01/2022    K 4.2 05/01/2022    CL 91 05/01/2022    CO2 39 05/01/2022    BUN 17 05/01/2022 CREATININE 1.0 05/01/2022    GFRAA >60 05/01/2022    LABGLOM 52 05/01/2022    GLUCOSE 118 05/01/2022    GLUCOSE 102 04/06/2012    PROT 6.6 04/27/2022    LABALBU 3.3 04/27/2022    LABALBU 4.7 04/06/2012    CALCIUM 9.1 05/01/2022    BILITOT 0.4 04/27/2022    ALKPHOS 154 04/27/2022    AST 38 04/27/2022    ALT 21 04/27/2022     ABG:    Lab Results   Component Value Date    PH 7.349 04/28/2022    PCO2 61.9 04/28/2022    PO2 80.6 04/28/2022    HCO3 33.3 04/28/2022    BE 6.4 04/28/2022    O2SAT 95.3 04/28/2022       Assessment:   1. Pneumonia  2. Acute hypoxemic respiratory failure  3. Hypercarbic respiratory failure  4. CKD  5. Protein calorie malnutrition  6. Leukocytosis-improving  7. Continue BiPAP at at bedtime and with naps.        Plan:   1. Sputum culture ordered gram stain with gram-positive cocci. 2. CT of chest reviewed. While official read is pending it does appear the patient has developed a significant pneumonia on the right side with air bronchograms and consolidation. There is an adjacent pleural effusion. Repeat CXR in am. ABG in am.   3. MRSA nasal screen negative however we will continue vancomycin and meropenem at this time  4. Continue diuretic as per primary and cardiology. 5. Flutter valve ordered. 6. Wean FiO2 as tolerated  7. DuoNebs every 4 hours while awake  8.  Continue BiPAP at at bedtime and with naps.     Thank you for the consult pulmonary will continue to follow     Mando Tinajero DO   Pulmonary, Critical Care and Sleep Medicine

## 2022-05-02 ENCOUNTER — APPOINTMENT (OUTPATIENT)
Dept: GENERAL RADIOLOGY | Age: 87
DRG: 193 | End: 2022-05-02
Payer: MEDICARE

## 2022-05-02 LAB
AADO2: 175.9 MMHG
ANION GAP SERPL CALCULATED.3IONS-SCNC: 4 MMOL/L (ref 7–16)
B.E.: 17.1 MMOL/L (ref -3–3)
B.E.: 20.2 MMOL/L (ref -3–3)
BUN BLDV-MCNC: 15 MG/DL (ref 6–23)
CALCIUM SERPL-MCNC: 9.4 MG/DL (ref 8.6–10.2)
CHLORIDE BLD-SCNC: 90 MMOL/L (ref 98–107)
CO2: 44 MMOL/L (ref 22–29)
COHB: 0.3 % (ref 0–1.5)
COHB: 0.4 % (ref 0–1.5)
CREAT SERPL-MCNC: 0.9 MG/DL (ref 0.5–1)
CRITICAL: ABNORMAL
CRITICAL: ABNORMAL
DATE ANALYZED: ABNORMAL
DATE ANALYZED: ABNORMAL
DATE OF COLLECTION: ABNORMAL
DATE OF COLLECTION: ABNORMAL
FIO2: 50 %
GFR AFRICAN AMERICAN: >60
GFR NON-AFRICAN AMERICAN: 59 ML/MIN/1.73
GLUCOSE BLD-MCNC: 109 MG/DL (ref 74–99)
HCO3: 44.9 MMOL/L (ref 22–26)
HCO3: 48.6 MMOL/L (ref 22–26)
HCT VFR BLD CALC: 37 % (ref 34–48)
HEMOGLOBIN: 10.9 G/DL (ref 11.5–15.5)
HHB: 3.8 % (ref 0–5)
HHB: 4.4 % (ref 0–5)
LAB: ABNORMAL
MCH RBC QN AUTO: 25.8 PG (ref 26–35)
MCHC RBC AUTO-ENTMCNC: 29.5 % (ref 32–34.5)
MCV RBC AUTO: 87.5 FL (ref 80–99.9)
METHB: 0 % (ref 0–1.5)
METHB: 0.7 % (ref 0–1.5)
MODE: ABNORMAL
MODE: ABNORMAL
O2 CONTENT: 15.2 ML/DL
O2 CONTENT: 15.5 ML/DL
O2 SATURATION: 95.6 % (ref 92–98.5)
O2 SATURATION: 96.2 % (ref 92–98.5)
O2HB: 94.6 % (ref 94–97)
O2HB: 95.8 % (ref 94–97)
OPERATOR ID: 459
OPERATOR ID: 882
PATIENT TEMP: 37 C
PATIENT TEMP: 37 C
PCO2: 73 MMHG (ref 35–45)
PCO2: 77.3 MMHG (ref 35–45)
PDW BLD-RTO: 17.2 FL (ref 11.5–15)
PEEP/CPAP: 8 CMH2O
PFO2: 1.62 MMHG/%
PH BLOOD GAS: 7.41 (ref 7.35–7.45)
PH BLOOD GAS: 7.42 (ref 7.35–7.45)
PLATELET # BLD: 213 E9/L (ref 130–450)
PMV BLD AUTO: 11.1 FL (ref 7–12)
PO2: 81.1 MMHG (ref 75–100)
PO2: 82.7 MMHG (ref 75–100)
POTASSIUM REFLEX MAGNESIUM: 4.2 MMOL/L (ref 3.5–5)
PRO-BNP: 4698 PG/ML (ref 0–450)
PS: 14 CMH20
RBC # BLD: 4.23 E12/L (ref 3.5–5.5)
RI(T): 2.17
SODIUM BLD-SCNC: 138 MMOL/L (ref 132–146)
SOURCE, BLOOD GAS: ABNORMAL
SOURCE, BLOOD GAS: ABNORMAL
THB: 11.2 G/DL (ref 11.5–16.5)
THB: 11.6 G/DL (ref 11.5–16.5)
TIME ANALYZED: 1030
TIME ANALYZED: 702
WBC # BLD: 11.1 E9/L (ref 4.5–11.5)

## 2022-05-02 PROCEDURE — 2140000000 HC CCU INTERMEDIATE R&B

## 2022-05-02 PROCEDURE — 6370000000 HC RX 637 (ALT 250 FOR IP)

## 2022-05-02 PROCEDURE — 6360000002 HC RX W HCPCS

## 2022-05-02 PROCEDURE — 6370000000 HC RX 637 (ALT 250 FOR IP): Performed by: FAMILY MEDICINE

## 2022-05-02 PROCEDURE — 2700000000 HC OXYGEN THERAPY PER DAY

## 2022-05-02 PROCEDURE — 94640 AIRWAY INHALATION TREATMENT: CPT

## 2022-05-02 PROCEDURE — 2580000003 HC RX 258

## 2022-05-02 PROCEDURE — 99233 SBSQ HOSP IP/OBS HIGH 50: CPT | Performed by: INTERNAL MEDICINE

## 2022-05-02 PROCEDURE — 2580000003 HC RX 258: Performed by: INTERNAL MEDICINE

## 2022-05-02 PROCEDURE — 71045 X-RAY EXAM CHEST 1 VIEW: CPT

## 2022-05-02 PROCEDURE — 80048 BASIC METABOLIC PNL TOTAL CA: CPT

## 2022-05-02 PROCEDURE — 6360000002 HC RX W HCPCS: Performed by: INTERNAL MEDICINE

## 2022-05-02 PROCEDURE — 6370000000 HC RX 637 (ALT 250 FOR IP): Performed by: INTERNAL MEDICINE

## 2022-05-02 PROCEDURE — 94660 CPAP INITIATION&MGMT: CPT

## 2022-05-02 PROCEDURE — 85027 COMPLETE CBC AUTOMATED: CPT

## 2022-05-02 PROCEDURE — 82805 BLOOD GASES W/O2 SATURATION: CPT

## 2022-05-02 PROCEDURE — 83880 ASSAY OF NATRIURETIC PEPTIDE: CPT

## 2022-05-02 PROCEDURE — 36415 COLL VENOUS BLD VENIPUNCTURE: CPT

## 2022-05-02 RX ORDER — ACETAZOLAMIDE 500 MG/5ML
500 INJECTION, POWDER, LYOPHILIZED, FOR SOLUTION INTRAVENOUS ONCE
Status: COMPLETED | OUTPATIENT
Start: 2022-05-02 | End: 2022-05-02

## 2022-05-02 RX ADMIN — VANCOMYCIN HYDROCHLORIDE 750 MG: 10 INJECTION, POWDER, LYOPHILIZED, FOR SOLUTION INTRAVENOUS at 15:22

## 2022-05-02 RX ADMIN — PANTOPRAZOLE SODIUM 40 MG: 40 TABLET, DELAYED RELEASE ORAL at 06:39

## 2022-05-02 RX ADMIN — CALCIUM POLYCARBOPHIL 625 MG: 625 TABLET, FILM COATED ORAL at 17:24

## 2022-05-02 RX ADMIN — CALCIUM POLYCARBOPHIL 625 MG: 625 TABLET, FILM COATED ORAL at 12:39

## 2022-05-02 RX ADMIN — HEPARIN SODIUM 5000 UNITS: 10000 INJECTION INTRAVENOUS; SUBCUTANEOUS at 06:39

## 2022-05-02 RX ADMIN — Medication 10 ML: at 21:43

## 2022-05-02 RX ADMIN — AMLODIPINE BESYLATE 10 MG: 10 TABLET ORAL at 12:41

## 2022-05-02 RX ADMIN — BENZONATATE 100 MG: 100 CAPSULE ORAL at 00:12

## 2022-05-02 RX ADMIN — ROPINIROLE HYDROCHLORIDE 4 MG: 2 TABLET, FILM COATED ORAL at 21:42

## 2022-05-02 RX ADMIN — HEPARIN SODIUM 5000 UNITS: 10000 INJECTION INTRAVENOUS; SUBCUTANEOUS at 15:24

## 2022-05-02 RX ADMIN — MEROPENEM 1000 MG: 1 INJECTION, POWDER, FOR SOLUTION INTRAVENOUS at 23:54

## 2022-05-02 RX ADMIN — ACETAZOLAMIDE 500 MG: 500 INJECTION, POWDER, LYOPHILIZED, FOR SOLUTION INTRAVENOUS at 10:15

## 2022-05-02 RX ADMIN — ASPIRIN 81 MG: 81 TABLET, COATED ORAL at 12:40

## 2022-05-02 RX ADMIN — IPRATROPIUM BROMIDE AND ALBUTEROL SULFATE 1 AMPULE: .5; 2.5 SOLUTION RESPIRATORY (INHALATION) at 16:09

## 2022-05-02 RX ADMIN — POTASSIUM BICARBONATE 20 MEQ: 782 TABLET, EFFERVESCENT ORAL at 12:41

## 2022-05-02 RX ADMIN — IPRATROPIUM BROMIDE AND ALBUTEROL SULFATE 1 AMPULE: .5; 2.5 SOLUTION RESPIRATORY (INHALATION) at 09:04

## 2022-05-02 RX ADMIN — CHOLECALCIFEROL TAB 10 MCG (400 UNIT) 400 UNITS: 10 TAB at 16:49

## 2022-05-02 RX ADMIN — LEVOTHYROXINE SODIUM 25 MCG: 0.03 TABLET ORAL at 12:40

## 2022-05-02 RX ADMIN — IPRATROPIUM BROMIDE AND ALBUTEROL SULFATE 1 AMPULE: .5; 2.5 SOLUTION RESPIRATORY (INHALATION) at 11:59

## 2022-05-02 RX ADMIN — MEROPENEM 1000 MG: 1 INJECTION, POWDER, FOR SOLUTION INTRAVENOUS at 11:29

## 2022-05-02 RX ADMIN — LOSARTAN POTASSIUM 25 MG: 25 TABLET, FILM COATED ORAL at 12:39

## 2022-05-02 RX ADMIN — MEROPENEM 1000 MG: 1 INJECTION, POWDER, FOR SOLUTION INTRAVENOUS at 00:04

## 2022-05-02 RX ADMIN — METOPROLOL SUCCINATE 25 MG: 25 TABLET, EXTENDED RELEASE ORAL at 12:40

## 2022-05-02 RX ADMIN — HEPARIN SODIUM 5000 UNITS: 10000 INJECTION INTRAVENOUS; SUBCUTANEOUS at 21:44

## 2022-05-02 RX ADMIN — IPRATROPIUM BROMIDE AND ALBUTEROL SULFATE 1 AMPULE: .5; 2.5 SOLUTION RESPIRATORY (INHALATION) at 19:26

## 2022-05-02 RX ADMIN — Medication 10 ML: at 10:24

## 2022-05-02 RX ADMIN — ATORVASTATIN CALCIUM 20 MG: 20 TABLET, FILM COATED ORAL at 12:40

## 2022-05-02 RX ADMIN — Medication 3 MG: at 00:13

## 2022-05-02 ASSESSMENT — PAIN SCALES - GENERAL: PAINLEVEL_OUTOF10: 0

## 2022-05-02 NOTE — PROGRESS NOTES
Pt continues to refuse the bipap. O2 at 10 L.  Alert x 4.compliant with care,repositioning at this time

## 2022-05-02 NOTE — PATIENT CARE CONFERENCE
[facility] Quality Flow/Interdisciplinary Rounds Progress Note        Quality Flow Rounds held on May 2, 2022    Disciplines Attending:  Bedside Nurse, ,  and Nursing Unit Leadership    Juan Francisco De Anda was admitted on 4/26/2022  2:32 PM    Anticipated Discharge Date:  Expected Discharge Date: 05/03/22    Disposition:    Avi Score:  Avi Scale Score: 16    Readmission Risk              Risk of Unplanned Readmission:  14           Discussed patient goal for the day, patient clinical progression, and barriers to discharge.   The following Goal(s) of the Day/Commitment(s) have been identified:  Diagnostics - Report Results and Labs - Report Results      Cheryle Mayer, RN  May 2, 2022

## 2022-05-02 NOTE — PROGRESS NOTES
Pharmacy Consultation Note  (Antibiotic Dosing and Monitoring)    Initial consult date: 4/27/22  Consulting physician/provider: Dr. Brandie Zazueta  Drug: Vancomycin  Indication: PNA    Age/  Gender Height Weight IBW  Allergy Information   89 y.o./female 4' 11\" (149.9 cm) 145 lb 4.5 oz (65.9 kg)     Ideal body weight: 48.8 kg (107 lb 8.4 oz)  Adjusted ideal body weight: 53.1 kg (116 lb 15.7 oz)   Elemental sulfur, Ibuprofen, and Penicillins      Renal Function:  Recent Labs     04/30/22  0647 05/01/22  0714 05/02/22  1047   BUN 20 17 15   CREATININE 1.1* 1.0 0.9       Intake/Output Summary (Last 24 hours) at 5/2/2022 1429  Last data filed at 5/2/2022 0554  Gross per 24 hour   Intake 0 ml   Output 1250 ml   Net -1250 ml       Vancomycin Monitoring:  Trough:  No results for input(s): VANCOTROUGH in the last 72 hours. Random:    Recent Labs     04/30/22  0647 05/01/22  0714   VANCORANDOM 13.9 15.0     Recent vancomycin administrations                   vancomycin (VANCOCIN) 750 mg in dextrose 5 % 250 mL IVPB (mg) 750 mg New Bag 05/01/22 1520    vancomycin (VANCOCIN) 750 mg in dextrose 5 % 250 mL IVPB (mg) 750 mg New Bag 04/30/22 1819    vancomycin 1000 mg IVPB in 250 mL D5W addavial (mg) 1,000 mg New Bag 04/29/22 1520                  Assessment:  · Patient is a 80 y.o. female who has been initiated on vancomycin  Estimated Creatinine Clearance: 35 mL/min (based on SCr of 0.9 mg/dL). · To dose vancomycin, pharmacy will be utilizing dosing based off of levels because of patient's renal impairment/insufficiency   · 4/27: Current sCr above baseline up to 1.5 from previous baseline ~1  · 4/28: WBC, Scr 1.4, MRSA nares swab pending  · 4/29: WBC 11.4, Scr 1.4, vancomycin level at 05:17 = 8 mcg/mL  · 4/30: WBC 9.6, Scr 1.1, vancomycin level at 06:47 = 13.9 mcg/mL  · 5/1: sCr stable, 12h post dose level 15 demonstrating steady vancomycin clerance  · 5/2: WBC WNL;  Scr 0.9; day #6 vancomycin; day #5 meropenem    Plan:  · Continue vancomycin 750 mg Q24h  · Vancomycin level as needed  · Will continue to monitor renal function     Aj Garcia, PharmD  Pharmacy Resident  P: [id number]  5/2/2022 2:29 PM

## 2022-05-02 NOTE — PROGRESS NOTES
INPATIENT CARDIOLOGY FOLLOW-UP    Name: Chin Bruno    Age: 80 y.o. Date of Admission: 4/26/2022  2:32 PM    Date of Service: 5/2/2022    Primary Cardiologist: Known to Alla Vincent / marta Henley    Chief Complaint: Follow-up for HFpEF    Interim History:  More lethargic per nursing staff overnight. ABG showing PCO2 in the 70s. Now on BiPAP. Drowsy but arousable, denies complaints.     Review of Systems:   Unable to obtain in detail    Problem List:  Patient Active Problem List   Diagnosis    Hip pain    Aseptic loosening of prosthetic hip ([facility]    DJD (degenerative joint disease) of knee    Knee pain    HTN (hypertension)    HLD (hyperlipidemia)    Closed fracture of left femur ([facility]    Closed subtrochanteric fracture of left femur with delayed healing    Near syncope    Chronic congestive heart failure ([facility]    Acute hypoxemic respiratory failure ([facility]    History of transcatheter aortic valve replacement (TAVR)       Current Medications:    Current Facility-Administered Medications:     vancomycin (VANCOCIN) 750 mg in dextrose 5 % 250 mL IVPB, 750 mg, IntraVENous, Q24H, Shazia Hernández DO, Stopped at 05/01/22 1900    furosemide (LASIX) injection 20 mg, 20 mg, IntraVENous, BID, Amelia Mota MD, 20 mg at 05/01/22 2043    meropenem (MERREM) 1,000 mg in sodium chloride 0.9 % 100 mL IVPB (mini-bag), 1,000 mg, IntraVENous, Q12H, Janeth Garrett DO, Stopped at 05/02/22 0300    guaiFENesin-dextromethorphan (ROBITUSSIN DM) 100-10 MG/5ML syrup 5 mL, 5 mL, Oral, Q4H PRN, Desire Morris DO    benzonatate (TESSALON) capsule 100 mg, 100 mg, Oral, TID PRN, Desire Morris DO, 100 mg at 05/02/22 0012    losartan (COZAAR) tablet 25 mg, 25 mg, Oral, Daily, Desire Morris DO, 25 mg at 05/01/22 4216    metoprolol succinate (TOPROL XL) extended release tablet 25 mg, 25 mg, Oral, Daily, Desire Morris DO, 25 mg at 05/01/22 0814    pantoprazole (PROTONIX) tablet 40 mg, 40 mg, Oral, AUDREY MALDONADO, Scheryl Pap Last Polka, DO, 40 mg at 05/02/22 4295    potassium bicarb-citric acid (EFFER-K) effervescent tablet 20 mEq, 20 mEq, Oral, Daily, Ailyn Sabal, DO, 20 mEq at 05/01/22 0813    perflutren lipid microspheres (DEFINITY) injection 1.65 mg, 1.5 mL, IntraVENous, ONCE PRN, Alfonso Wilkins PA-C    hydrALAZINE (APRESOLINE) injection 10 mg, 10 mg, IntraVENous, Q6H PRN, [facility] SHAHBAZ Montalvo    ipratropium-albuterol (DUONEB) nebulizer solution 1 ampule, 1 ampule, Inhalation, Q4H WA, Shazia Hernández, DO, 1 ampule at 05/01/22 2050    amLODIPine (NORVASC) tablet 10 mg, 10 mg, Oral, Daily, Annamae Lente, APRN - CNP, 10 mg at 05/01/22 7968    aspirin EC tablet 81 mg, 81 mg, Oral, Daily, Annamae Lente, APRN - CNP, 81 mg at 05/01/22 0813    atorvastatin (LIPITOR) tablet 20 mg, 20 mg, Oral, Daily, Annamae Lente, APRN - CNP, 20 mg at 05/01/22 6270    [Held by provider] cloNIDine (CATAPRES) tablet 0.1 mg, 0.1 mg, Oral, BID, Annamae Lente, APRN - CNP, 0.1 mg at 04/26/22 2316    clopidogrel (PLAVIX) tablet 75 mg, 75 mg, Oral, Daily, Annamae Lente, APRN - CNP, 75 mg at 05/01/22 0813    polycarbophil (FIBERCON) tablet 625 mg, 625 mg, Oral, BID, Annamae Lente, APRN - CNP, 625 mg at 05/01/22 2043    levothyroxine (SYNTHROID) tablet 25 mcg, 25 mcg, Oral, Daily, Annamae Lente, APRN - CNP, 25 mcg at 05/01/22 0503    rOPINIRole (REQUIP) tablet 4 mg, 4 mg, Oral, Nightly, Annamae Lente, APRN - CNP, 4 mg at 05/01/22 2043    vitamin D3 (CHOLECALCIFEROL) tablet 400 Units, 400 Units, Oral, Daily, Kamron Clarke APRN - CNP, 400 Units at 05/01/22 1713    sodium chloride flush 0.9 % injection 5-40 mL, 5-40 mL, IntraVENous, 2 times per day, Kamron Clarke APRN - CNP, 10 mL at 05/01/22 2044    sodium chloride flush 0.9 % injection 5-40 mL, 5-40 mL, IntraVENous, PRN, Kamron Clarke, APRN - CNP    0.9 % sodium chloride infusion, , IntraVENous, PRN, Kamron Robertste, APRN - CNP    ondansetron (ZOFRAN-ODT) disintegrating tablet 4 mg, 4 mg, Oral, Q8H PRN **OR** ondansetron (ZOFRAN) injection 4 mg, 4 mg, IntraVENous, Q6H PRN, Shirin Snide, APRN - CNP    polyethylene glycol (GLYCOLAX) packet 17 g, 17 g, Oral, Daily PRN, Carpio Snide, APRN - CNP    acetaminophen (TYLENOL) tablet 650 mg, 650 mg, Oral, Q6H PRN **OR** acetaminophen (TYLENOL) suppository 650 mg, 650 mg, Rectal, Q6H PRN, Carpio Snide, APRN - CNP    heparin (porcine) injection 5,000 Units, 5,000 Units, SubCUTAneous, 3 times per day, Carpio Snide, APRN - CNP, 5,000 Units at 05/02/22 9682    melatonin tablet 3 mg, 3 mg, Oral, Nightly PRN, Alicia Gosselin, DO, 3 mg at 05/02/22 0013    Physical Exam:  BP (!) 147/63   Pulse 84   Temp 97.3 °F (36.3 °C) (Oral)   Resp 21   Ht 4' 11\" (1.499 m)   Wt 141 lb 1.5 oz (64 kg)   SpO2 94%   BMI 28.50 kg/m²   Wt Readings from Last 3 Encounters:   05/01/22 141 lb 1.5 oz (64 kg)   09/12/20 133 lb 0.1 oz (60.3 kg)   05/25/16 134 lb (60.8 kg)     Appearance: Frail-appearing elderly female, on BiPAP and sleepy  Skin: Intact, no rash  Head: Normocephalic, atraumatic  Eyes: EOMI, no conjunctival erythema  ENMT: No pharyngeal erythema, MMM, no rhinorrhea  Neck: Supple, no elevated JVP, no carotid bruits  Lungs: Diminished  Cardiac: PMI nondisplaced, Regular rhythm with a normal rate, S1 & S2 normal, no murmurs  Abdomen: Soft, nontender, +bowel sounds  Extremities: Moves all extremities x 4, no significant lower extremity edema  Neurologic: No focal motor deficits apparent, normal mood and affect  Peripheral Pulses: Intact posterior tibial pulses bilaterally    Intake/Output:    Intake/Output Summary (Last 24 hours) at 5/2/2022 0732  Last data filed at 5/2/2022 0554  Gross per 24 hour   Intake 480 ml   Output 2150 ml   Net -1670 ml     No intake/output data recorded.     Laboratory Tests:  Recent Labs     04/30/22  0647 05/01/22  0714    140   K 4.6 4.2   CL 94* 91*   CO2 37* 39*   BUN 20 17   CREATININE 1. 1* 1.0   GLUCOSE 111* 118*   CALCIUM 9.2 9.1     Lab Results   Component Value Date    MG 2.0 2022     No results for input(s): ALKPHOS, ALT, AST, PROT, BILITOT, BILIDIR, LABALBU in the last 72 hours. Recent Labs     22  0647 22  0714 22  0549   WBC 9.6 9.6 11.1   RBC 3.82 3.75 4.23   HGB 9.9* 9.8* 10.9*   HCT 32.9* 32.7* 37.0   MCV 86.1 87.2 87.5   MCH 25.9* 26.1 25.8*   MCHC 30.1* 30.0* 29.5*   RDW 17.6* 17.5* 17.2*    205 213   MPV 11.1 11.0 11.1     Lab Results   Component Value Date    CKTOTAL 115 2014    CKMB 3.1 2014    TROPONINI <0.01 2020    TROPONINI <0.01 2014     Lab Results   Component Value Date    INR 0.9 2021    INR 1.0 2015    INR 1.1 2014    PROTIME 11.0 2021    PROTIME 11.3 2015    PROTIME 12.1 2014     Lab Results   Component Value Date    TSH 0.822 2022     Lab Results   Component Value Date    LABA1C 5.9 02/15/2014     No results found for: EAG  Lab Results   Component Value Date    CHOL 131 2022    CHOL 187 2021    CHOL 206 (H) 2020     Lab Results   Component Value Date    TRIG 66 2022    TRIG 85 2021    TRIG 112 2020     Lab Results   Component Value Date    HDL 64 2022    HDL 95 2022    HDL 88 2021     Lab Results   Component Value Date    LDLCALC 54 2022    LDLCALC 71 2022    LDLCALC 82 2021     Lab Results   Component Value Date    LABVLDL 13 2022    LABVLDL 17 2022    LABVLDL 17 2021     No results found for: CHOLHDLRATIO  Recent Labs     22  0714 22  0549   PROBNP 4,489* 4,698*       Cardiac Tests:    EK2022: Sinus rhythm 70 bpm.  Left axis. Normal intervals. Possible prior inferior infarct. Possible prior anterior infarct. Telemetry:   Sinus rhythm 50s to 60s    Chest X-ray:   22    Impression   1.  No interval change in extensive multifocal pneumonia within the right lung   more prominent within the right upper lobe   2. Very minimal partial interval clearing of the left perihilar pneumonia. 3. Cardiomegaly       CT chest WO 4/27/22  Impression   1. Large consolidation suggestive of pneumonia in the right upper lobe   posteriorly.  Multiple scattered bilateral ill-defined opacities are   otherwise present suggesting additional infiltrates.  Septal thickening   asymmetric rightward consistent with interstitial edema. 2. Right larger than left pleural effusions. 3. Cardiomegaly. 4. Focal lower esophageal density suggesting large pill. 5. Mild ascites. Echocardiogram:   TTE 4/28/22 Mariel     Summary   Normal left ventricular chamber size. Normal left ventricular systolic function. Visually estimated LVEF is 55-60 %. Mild concentric LVH. No wall motion abnormalities. Grade II diastolic dysfunction with elevated LA pressure. Mildly dilated right ventricle with preserved function. The left atrium is moderately dilated. Normal right atrial size. Mild pulmonary hypertension with a PASP of 42 mm Hg. There is a bioprosthetic aortic valve in place that appears well seated   with acceptable function and gradients. Trace regurgitation present. No comparison study available. Stress test:  remote    Cardiac catheterization:     ----------------------------------------------------------------------------------------------------------------------------------------------------------------  IMPRESSION:  1. Acute on chronic HFpEF. Net -6.2 L  2. Acute on chronic hypoxic/hypercapneic respiratory failure/pneumonia. On BiPAP  3. Severe AS s/p TAVR [facility] 2021  4. CARLOS/CKD Cr 1.4 -> 1.0 resolved  5. Hypertension  6. Hypothyroid  7.  Anemia Hgb 10.9    RECOMMENDATIONS:     Continue IV diuresis for another 24 hours or so   Continue current cardiac medication otherwise   On DAPT, unclear indication for long-term   Continue current cardiac medication otherwise   Further care per primary, pulmonary    Jonelle Watson MD, [address] [facility]    NOTE: This report was transcribed using voice recognition software. Every effort was made to ensure accuracy; however, inadvertent computerized transcription errors may be present.

## 2022-05-02 NOTE — PROGRESS NOTES
Pt continues to refuse bipap, calling staff names, refusing any care at this time trying to get out of bed without assist. CHRIS/bed alarm and frequent checks continue. Alert to person and place at this time. Oxygen at 10 L via nasal canula.

## 2022-05-02 NOTE — PROGRESS NOTES
Date: 5/1/2022    Time: 10:35 PM    Patient Placed On BIPAP/CPAP/ Non-Invasive Ventilation? Yes    If no must comment. Facial area red/color change? No           If YES are Blister/Lesion present? No   If yes must notify nursing staff  BIPAP/CPAP skin barrier?   Yes    Skin barrier type:mepilexlite       Comments:        Madina Rowan RCP

## 2022-05-02 NOTE — PLAN OF CARE
ABG reviewed. Consistent with contraction alkalosis. Lasix discontinued. Diamox ordered.      V[place] Veronica,

## 2022-05-02 NOTE — PROGRESS NOTES
[place]  Department of Pulmonary, Critical Care and Sleep Medicine  [address] [facility]  Department of Internal Medicine  Progress Note    SUBJECTIVE:    Patient seen and examined this morning. Taken off of BiPAP. Now on nasal cannula. Overall reports that her breathing feels better. ABG results noted. OBJECTIVE:  Vitals:    05/02/22 1158 05/02/22 1159 05/02/22 1230 05/02/22 1611   BP:   (!) 141/64    Pulse:   74    Resp: 21 24     Temp:       TempSrc:       SpO2: 97% 97%  98%   Weight:       Height:         Constitutional: Alert,  EENT: EOMI YAEL. Mucous membranes dry  Neck: No thyromegaly. Trachea was midline. Respiratory: Scattered rales bilaterally mid to base  Cardiovascular: Regular, No murmur. No rubs. Pulses:  Equal bilaterally. Abdomen: Soft without organomegaly. No rebound, rigidity. No guarding. Lymphatic: No lymphadenopathy. Musculoskeletal: Without weakness or gross deficits  Extremities:  No lower extremity edema. Reflexes appear adequate. Skin:  Warm and dry. No skin rashes. Neurological/Psychiatric: No acute psychosis. Cranial nerves are intact. DATA:    Monitor Strips:  Reviewed & discusses with technical team. No changes noted. RADIOLOGY:  Chest x-ray reviewed and appears grossly stable.     CBC with Differential:    Lab Results   Component Value Date    WBC 11.1 05/02/2022    RBC 4.23 05/02/2022    HGB 10.9 05/02/2022    HCT 37.0 05/02/2022     05/02/2022    MCV 87.5 05/02/2022    MCH 25.8 05/02/2022    MCHC 29.5 05/02/2022    RDW 17.2 05/02/2022    SEGSPCT 62 02/18/2014    LYMPHOPCT 6.2 04/27/2022    MONOPCT 14.1 04/27/2022    EOSPCT 4 10/11/2010    BASOPCT 0.2 04/27/2022    MONOSABS 2.07 04/27/2022    LYMPHSABS 0.91 04/27/2022    EOSABS 0.04 04/27/2022    BASOSABS 0.03 04/27/2022     CMP:    Lab Results   Component Value Date     05/02/2022    K 4.2 05/02/2022    CL 90 05/02/2022    CO2 44 05/02/2022    BUN 15 05/02/2022    CREATININE 0.9 05/02/2022    GFRAA >60 05/02/2022    LABGLOM 59 05/02/2022    GLUCOSE 109 05/02/2022    GLUCOSE 102 04/06/2012    PROT 6.6 04/27/2022    LABALBU 3.3 04/27/2022    LABALBU 4.7 04/06/2012    CALCIUM 9.4 05/02/2022    BILITOT 0.4 04/27/2022    ALKPHOS 154 04/27/2022    AST 38 04/27/2022    ALT 21 04/27/2022     ABG:    Lab Results   Component Value Date    PH 7.416 05/02/2022    PCO2 77.3 05/02/2022    PO2 81.1 05/02/2022    HCO3 48.6 05/02/2022    BE 20.2 05/02/2022    O2SAT 95.6 05/02/2022       Assessment:   1. Pneumonia  2. Acute hypoxemic respiratory failure  3. Hypercarbic respiratory failure  4. CKD  5. Protein calorie malnutrition  6. Leukocytosis-improving  7. Continue BiPAP at at bedtime and with naps. 8.  Contraction alkalosis        Plan:   1. Sputum culture ordered gram stain with gram-positive cocci. 2. CT of chest reviewed. While official read is pending it does appear the patient has developed a significant pneumonia on the right side with air bronchograms and consolidation. There is an adjacent pleural effusion. Chest x-ray and ABG reviewed. ABG consistent with contraction alkalosis. Furosemide and given 1 dose of Diamox. Chest x-ray overall stable. 3. MRSA nasal screen negative however we will continue vancomycin and meropenem at this time  4. Flutter valve ordered. 5. Wean FiO2 as tolerated  6. DuoNebs every 4 hours while awake  7.  Continue BiPAP at at bedtime and with naps.     Thank you for the consult pulmonary will continue to follow     Dylan Washington, DO   Pulmonary, Critical Care and Sleep Medicine

## 2022-05-02 NOTE — PROGRESS NOTES
Hospitalist Progress Note      Synopsis: Patient admitted for shortness of breath   patient is a 80y.o. year old female with a recent TAVR at [facility] in 2021, who was recently seen by her primary care provider with complaint of shortness of breath, and lower extremity edema. Her primary care provider recommended her go to the ER for further evaluation and diuresis. Patient lives with her daughter and up until about 3 weeks ago was very active and able to ambulate well around her home with a walker. Patient's daughter states over the last 3 weeks her shortness of breath has gotten progressively worse. Patient was admitted to telemetry for further monitoring. During the course of her stay and RRT was called due to change in mental status, patient's CODE STATUS was changed to DNR CCA. Pulmonology consulted for BiPAP dependence. A CT of the chest revealed large consolidation suggestive of pneumonia in the right upper lobe posteriorly. Vanco and meropenem was ordered IV. Chest x-ray from - shows persistent right upper and lower lobe alveolar opacity with new left perihilar asymmetric alveolar  compatible with worsening pneumonia. Chest x-ray from - shows no interval change and extensive multifocal pneumonia within the right lung, more prominent within the right upper lobe, very minimal partial interval clearing of the left perihilar pneumonia, cardiomegaly. Hospital day 6     Subjective:  Patient seen and examined at bedside, patient taken off BiPAP and high flow nasal cannula placed. Patient states she is feeling much better. BiPAP non compliance reported overnight. Patient seen and examined  Records reviewed. Temp (24hrs), Av.7 °F (36.5 °C), Min:97.3 °F (36.3 °C), Max:98.4 °F (36.9 °C)    DIET: ADULT DIET; Regular;  Low Sodium (2 gm)  CODE: Limited    Intake/Output Summary (Last 24 hours) at 2022 0995  Last data filed at 2022 0554  Gross per 24 hour   Intake 480 ml Output 2150 ml   Net -1670 ml       Review of Systems: All bolded are positive; please see HPI  General:  Fever, chills, diaphoresis, fatigue, malaise, night sweats, weight loss  Psychological:  Anxiety, disorientation, hallucinations. ENT:  Epistaxis, headaches, vertigo, visual changes. Cardiovascular:  Chest pain, irregular heartbeats, palpitations, paroxysmal nocturnal dyspnea. Respiratory:  Shortness of breath, coughing, sputum production, hemoptysis, wheezing, orthopnea. Gastrointestinal:  Nausea, vomiting, diarrhea, heartburn, constipation, abdominal pain, hematemesis, hematochezia, melena, acholic stools  Genito-Urinary:  Dysuria, urgency, frequency, hematuria  Musculoskeletal:  Joint pain, joint stiffness, joint swelling, muscle pain  Neurology:  Headache, focal neurological deficits, weakness, numbness, paresthesia  Derm:  Rashes, ulcers, excoriations, bruising  Extremities:  Decreased ROM, peripheral edema, mottling    Objective:    BP (!) 135/44   Pulse 63   Temp 98.4 °F (36.9 °C) (Axillary)   Resp 28   Ht 4' 11\" (1.499 m)   Wt 131 lb 2.8 oz (59.5 kg)   SpO2 98%   BMI 26.49 kg/m²     General appearance: Elderly female, no apparent distress, appears stated age and cooperative. Respiratory: Rales auscultated throughout lung fields anteriorly and posteriorly, bilaterally. Tachypneic and labored respiratory effort. Strong cough. Cardiovascular:  RRR. S1, S2 without MRG. PV: Pulses palpable. 1 + pitting edema in bilateral lower extremities  Abdomen: Soft, non-tender, non-distended. +BS  Musculoskeletal: No obvious deformities. Skin: Normal skin color pale. Neurologic:  Grossly non-focal. Awake, alert to voice, not following commands. Psychiatric: Alert and oriented x4, thought content appropriate.      Medications:  REVIEWED DAILY    Infusion Medications    sodium chloride       Scheduled Medications    vancomycin  750 mg IntraVENous Q24H    furosemide  20 mg IntraVENous BID meropenem  1,000 mg IntraVENous Q12H    losartan  25 mg Oral Daily    metoprolol succinate  25 mg Oral Daily    pantoprazole  40 mg Oral QAM AC    potassium bicarb-citric acid  20 mEq Oral Daily    ipratropium-albuterol  1 ampule Inhalation Q4H WA    amLODIPine  10 mg Oral Daily    aspirin  81 mg Oral Daily    atorvastatin  20 mg Oral Daily    [Held by provider] cloNIDine  0.1 mg Oral BID    clopidogrel  75 mg Oral Daily    polycarbophil  625 mg Oral BID    levothyroxine  25 mcg Oral Daily    rOPINIRole  4 mg Oral Nightly    vitamin D3  400 Units Oral Daily    sodium chloride flush  5-40 mL IntraVENous 2 times per day    heparin (porcine)  5,000 Units SubCUTAneous 3 times per day     PRN Meds: guaiFENesin-dextromethorphan, benzonatate, perflutren lipid microspheres, hydrALAZINE, sodium chloride flush, sodium chloride, ondansetron **OR** ondansetron, polyethylene glycol, acetaminophen **OR** acetaminophen, melatonin    Labs:     Recent Labs     04/30/22  0647 05/01/22  0714 05/02/22  0549   WBC 9.6 9.6 11.1   HGB 9.9* 9.8* 10.9*   HCT 32.9* 32.7* 37.0    205 213       Recent Labs     04/30/22  0647 05/01/22  0714    140   K 4.6 4.2   CL 94* 91*   CO2 37* 39*   BUN 20 17   CREATININE 1.1* 1.0   CALCIUM 9.2 9.1       No results for input(s): PROT, ALB, ALKPHOS, ALT, AST, BILITOT, AMYLASE, LIPASE in the last 72 hours. No results for input(s): INR in the last 72 hours. No results for input(s): Juan Nawaf in the last 72 hours.     Chronic labs:    Lab Results   Component Value Date    CHOL 131 04/27/2022    TRIG 66 04/27/2022    HDL 64 04/27/2022    LDLCALC 54 04/27/2022    TSH 0.822 04/26/2022    INR 0.9 08/06/2021    LABA1C 5.9 02/15/2014       Radiology: REVIEWED DAILY    Assessment:  Acute on chronic congestive heart failure  Acute respiratory failure with hypoxia  Pneumonia  Elevated troponin at 44  Leukocytosis (resolving) 11.1  CARLOS creatinine, (resolved) creatinine 0.9

## 2022-05-02 NOTE — CARE COORDINATION
[facility] to check on referral, they do not have any beds. Called daughter Micah Narvaez, who states that pt wants to go home and that she is agreeable. Pt lives with daughter, will need hhc and currently on 10LO2 at 97% and will probably need home O2 at discharge, will need an ambulatory pulse ox. Daughter would like [facility], and [employer] for home O2. Referral to [facility], they accepted and will need hhc orders. Referral to [facility], faxed demo page, anticipate need for home O2 and possible bipap/NIV. Per daughter pt has a walker, cane, bedside commode, shower chair with hand rails, and a spirometer. Per daughter she will transport home. Russell Magallanes, MSW, LSW

## 2022-05-03 PROBLEM — E44.0 MODERATE PROTEIN-CALORIE MALNUTRITION (HCC): Chronic | Status: ACTIVE | Noted: 2022-05-03

## 2022-05-03 LAB
ANION GAP SERPL CALCULATED.3IONS-SCNC: 5 MMOL/L (ref 7–16)
BUN BLDV-MCNC: 14 MG/DL (ref 6–23)
CALCIUM SERPL-MCNC: 9.2 MG/DL (ref 8.6–10.2)
CHLORIDE BLD-SCNC: 98 MMOL/L (ref 98–107)
CO2: 36 MMOL/L (ref 22–29)
CREAT SERPL-MCNC: 0.9 MG/DL (ref 0.5–1)
GFR AFRICAN AMERICAN: >60
GFR NON-AFRICAN AMERICAN: 59 ML/MIN/1.73
GLUCOSE BLD-MCNC: 98 MG/DL (ref 74–99)
HCT VFR BLD CALC: 34.1 % (ref 34–48)
HEMOGLOBIN: 10.3 G/DL (ref 11.5–15.5)
MCH RBC QN AUTO: 25.8 PG (ref 26–35)
MCHC RBC AUTO-ENTMCNC: 30.2 % (ref 32–34.5)
MCV RBC AUTO: 85.3 FL (ref 80–99.9)
PDW BLD-RTO: 17.5 FL (ref 11.5–15)
PLATELET # BLD: 220 E9/L (ref 130–450)
PMV BLD AUTO: 11.5 FL (ref 7–12)
POTASSIUM REFLEX MAGNESIUM: 4.2 MMOL/L (ref 3.5–5)
RBC # BLD: 4 E12/L (ref 3.5–5.5)
SODIUM BLD-SCNC: 139 MMOL/L (ref 132–146)
WBC # BLD: 14.5 E9/L (ref 4.5–11.5)

## 2022-05-03 PROCEDURE — 6370000000 HC RX 637 (ALT 250 FOR IP): Performed by: INTERNAL MEDICINE

## 2022-05-03 PROCEDURE — 2700000000 HC OXYGEN THERAPY PER DAY

## 2022-05-03 PROCEDURE — 2580000003 HC RX 258: Performed by: INTERNAL MEDICINE

## 2022-05-03 PROCEDURE — 6360000002 HC RX W HCPCS: Performed by: EMERGENCY MEDICINE

## 2022-05-03 PROCEDURE — 6360000002 HC RX W HCPCS

## 2022-05-03 PROCEDURE — 99232 SBSQ HOSP IP/OBS MODERATE 35: CPT | Performed by: INTERNAL MEDICINE

## 2022-05-03 PROCEDURE — 2580000003 HC RX 258

## 2022-05-03 PROCEDURE — 85027 COMPLETE CBC AUTOMATED: CPT

## 2022-05-03 PROCEDURE — 6370000000 HC RX 637 (ALT 250 FOR IP): Performed by: FAMILY MEDICINE

## 2022-05-03 PROCEDURE — 99233 SBSQ HOSP IP/OBS HIGH 50: CPT | Performed by: INTERNAL MEDICINE

## 2022-05-03 PROCEDURE — 94660 CPAP INITIATION&MGMT: CPT

## 2022-05-03 PROCEDURE — 80048 BASIC METABOLIC PNL TOTAL CA: CPT

## 2022-05-03 PROCEDURE — 94640 AIRWAY INHALATION TREATMENT: CPT

## 2022-05-03 PROCEDURE — 36415 COLL VENOUS BLD VENIPUNCTURE: CPT

## 2022-05-03 PROCEDURE — 2140000000 HC CCU INTERMEDIATE R&B

## 2022-05-03 PROCEDURE — 6370000000 HC RX 637 (ALT 250 FOR IP)

## 2022-05-03 PROCEDURE — 6360000002 HC RX W HCPCS: Performed by: INTERNAL MEDICINE

## 2022-05-03 PROCEDURE — 2580000003 HC RX 258: Performed by: EMERGENCY MEDICINE

## 2022-05-03 PROCEDURE — 94669 MECHANICAL CHEST WALL OSCILL: CPT

## 2022-05-03 RX ORDER — BUMETANIDE 1 MG/1
1 TABLET ORAL DAILY
Status: DISCONTINUED | OUTPATIENT
Start: 2022-05-04 | End: 2022-05-05 | Stop reason: HOSPADM

## 2022-05-03 RX ORDER — SODIUM CHLORIDE 0.9 % (FLUSH) 0.9 %
5-40 SYRINGE (ML) INJECTION EVERY 12 HOURS SCHEDULED
Status: DISCONTINUED | OUTPATIENT
Start: 2022-05-03 | End: 2022-05-05 | Stop reason: HOSPADM

## 2022-05-03 RX ORDER — HEPARIN SODIUM (PORCINE) LOCK FLUSH IV SOLN 100 UNIT/ML 100 UNIT/ML
1 SOLUTION INTRAVENOUS PRN
Status: DISCONTINUED | OUTPATIENT
Start: 2022-05-03 | End: 2022-05-05 | Stop reason: HOSPADM

## 2022-05-03 RX ORDER — SODIUM CHLORIDE 9 MG/ML
INJECTION, SOLUTION INTRAVENOUS PRN
Status: DISCONTINUED | OUTPATIENT
Start: 2022-05-03 | End: 2022-05-05 | Stop reason: HOSPADM

## 2022-05-03 RX ORDER — HEPARIN SODIUM (PORCINE) LOCK FLUSH IV SOLN 100 UNIT/ML 100 UNIT/ML
1 SOLUTION INTRAVENOUS EVERY 12 HOURS SCHEDULED
Status: DISCONTINUED | OUTPATIENT
Start: 2022-05-03 | End: 2022-05-05 | Stop reason: HOSPADM

## 2022-05-03 RX ORDER — SODIUM CHLORIDE 0.9 % (FLUSH) 0.9 %
5-40 SYRINGE (ML) INJECTION PRN
Status: DISCONTINUED | OUTPATIENT
Start: 2022-05-03 | End: 2022-05-05 | Stop reason: HOSPADM

## 2022-05-03 RX ADMIN — CALCIUM POLYCARBOPHIL 625 MG: 625 TABLET, FILM COATED ORAL at 16:51

## 2022-05-03 RX ADMIN — IPRATROPIUM BROMIDE AND ALBUTEROL SULFATE 1 AMPULE: .5; 2.5 SOLUTION RESPIRATORY (INHALATION) at 08:12

## 2022-05-03 RX ADMIN — IPRATROPIUM BROMIDE AND ALBUTEROL SULFATE 1 AMPULE: .5; 2.5 SOLUTION RESPIRATORY (INHALATION) at 16:37

## 2022-05-03 RX ADMIN — HEPARIN SODIUM 5000 UNITS: 10000 INJECTION INTRAVENOUS; SUBCUTANEOUS at 06:30

## 2022-05-03 RX ADMIN — LEVOTHYROXINE SODIUM 25 MCG: 0.03 TABLET ORAL at 06:30

## 2022-05-03 RX ADMIN — ATORVASTATIN CALCIUM 20 MG: 20 TABLET, FILM COATED ORAL at 08:32

## 2022-05-03 RX ADMIN — HEPARIN 100 UNITS: 100 SYRINGE at 20:44

## 2022-05-03 RX ADMIN — LOSARTAN POTASSIUM 25 MG: 25 TABLET, FILM COATED ORAL at 08:32

## 2022-05-03 RX ADMIN — IPRATROPIUM BROMIDE AND ALBUTEROL SULFATE 1 AMPULE: .5; 2.5 SOLUTION RESPIRATORY (INHALATION) at 11:57

## 2022-05-03 RX ADMIN — PANTOPRAZOLE SODIUM 40 MG: 40 TABLET, DELAYED RELEASE ORAL at 06:30

## 2022-05-03 RX ADMIN — HEPARIN SODIUM 5000 UNITS: 10000 INJECTION INTRAVENOUS; SUBCUTANEOUS at 14:09

## 2022-05-03 RX ADMIN — METOPROLOL SUCCINATE 25 MG: 25 TABLET, EXTENDED RELEASE ORAL at 08:32

## 2022-05-03 RX ADMIN — IPRATROPIUM BROMIDE AND ALBUTEROL SULFATE 1 AMPULE: .5; 2.5 SOLUTION RESPIRATORY (INHALATION) at 20:42

## 2022-05-03 RX ADMIN — SODIUM CHLORIDE, PRESERVATIVE FREE 10 ML: 5 INJECTION INTRAVENOUS at 20:44

## 2022-05-03 RX ADMIN — Medication 10 ML: at 08:33

## 2022-05-03 RX ADMIN — MEROPENEM 1000 MG: 1 INJECTION, POWDER, FOR SOLUTION INTRAVENOUS at 23:01

## 2022-05-03 RX ADMIN — POTASSIUM BICARBONATE 20 MEQ: 782 TABLET, EFFERVESCENT ORAL at 08:32

## 2022-05-03 RX ADMIN — MEROPENEM 1000 MG: 1 INJECTION, POWDER, FOR SOLUTION INTRAVENOUS at 10:59

## 2022-05-03 RX ADMIN — ROPINIROLE HYDROCHLORIDE 4 MG: 2 TABLET, FILM COATED ORAL at 20:44

## 2022-05-03 RX ADMIN — HEPARIN SODIUM 5000 UNITS: 10000 INJECTION INTRAVENOUS; SUBCUTANEOUS at 23:02

## 2022-05-03 RX ADMIN — CHOLECALCIFEROL TAB 10 MCG (400 UNIT) 400 UNITS: 10 TAB at 16:51

## 2022-05-03 RX ADMIN — ASPIRIN 81 MG: 81 TABLET, COATED ORAL at 08:32

## 2022-05-03 RX ADMIN — CALCIUM POLYCARBOPHIL 625 MG: 625 TABLET, FILM COATED ORAL at 08:32

## 2022-05-03 RX ADMIN — AMLODIPINE BESYLATE 10 MG: 10 TABLET ORAL at 08:32

## 2022-05-03 RX ADMIN — Medication 3 MG: at 20:45

## 2022-05-03 ASSESSMENT — PAIN SCALES - GENERAL
PAINLEVEL_OUTOF10: 0

## 2022-05-03 NOTE — CARE COORDINATION
Reviewed chart, pt currently on 5LO2HF at 94-98%. Started on PO bumex daily. IV vanco q24 and IV merrem q12. Perfect served requesting therapy when he felt appropriate. Current plan is home with family and [facility], received message from [employer] and [employer] that they accepted, will need hhc orders. [employer] following for home O2, will need an ambulatory pulse ox. Patric Blandon, MSW, LSW

## 2022-05-03 NOTE — CONSULTS
[address] [place] [address] [facility]    Consultation Note     Admit Date: 4/26/2022  2:32 PM    Reason for Consult: Complicated pneumonia    Attending Physician:  Abner Shrestha MD     Chief Complaint: Shortness of breath/leg swelling and cough    HISTORY OF PRESENT ILLNESS:   The patient is a 80 y.o.  female not known to the Infectious Diseases service. The patient i has history of COPD from chronic smoking admitted 7 days ago with shortness of breath and cough. On admission she was hypoxic requiring BiPAP chest x-ray did show right upper lobe pneumonia with air bronchograms. She was treated with IV vancomycin and meropenem for the past 7 days. She clinically did improve to 4 L of oxygen saturating 98 to 99%. Her white count has been normal today is 14.5. Platelets of 960 BNP is 5000 creatinine was 0.9/14. Chest x-ray today shows slight increase worsening of the right upper lobe infiltrates along with effusion. Patient did have bilateral effusions even on admission.     Past Medical History:        Diagnosis Date    Arthritis     Cataract     Closed subtrochanteric fracture of left femur with delayed healing 05/25/2016    Heart murmur     History of cardiovascular stress test 02/17/2014    lexiscan    History of transcatheter aortic valve replacement (TAVR)     Hyperlipidemia     Hypertension      Past Surgical History:        Procedure Laterality Date    APPENDECTOMY      HIP SURGERY      pins in L hip    HIP SURGERY Left 8/20/15    im nail pinning and hardware removal    HYSTERECTOMY      JOINT REPLACEMENT  5/10/2012    total r hip    PELVIC FRACTURE SURGERY  2008    REVISION TOTAL HIP ARTHROPLASTY      R hip 1999    SKIN CANCER EXCISION      facial and      Current Medications:   Scheduled Meds:   [START ON 5/4/2022] bumetanide  1 mg Oral Daily    lidocaine  5 mL IntraDERmal Once    sodium chloride flush  5-40 mL IntraVENous 2 times per day    heparin flush  1 mL IntraVENous 2 times per day    meropenem  1,000 mg IntraVENous Q12H    losartan  25 mg Oral Daily    metoprolol succinate  25 mg Oral Daily    pantoprazole  40 mg Oral QAM AC    potassium bicarb-citric acid  20 mEq Oral Daily    ipratropium-albuterol  1 ampule Inhalation Q4H WA    amLODIPine  10 mg Oral Daily    aspirin  81 mg Oral Daily    atorvastatin  20 mg Oral Daily    [Held by provider] cloNIDine  0.1 mg Oral BID    [Held by provider] clopidogrel  75 mg Oral Daily    polycarbophil  625 mg Oral BID    levothyroxine  25 mcg Oral Daily    rOPINIRole  4 mg Oral Nightly    vitamin D3  400 Units Oral Daily    heparin (porcine)  5,000 Units SubCUTAneous 3 times per day     Continuous Infusions:   sodium chloride       PRN Meds:sodium chloride flush, sodium chloride, heparin flush, guaiFENesin-dextromethorphan, benzonatate, perflutren lipid microspheres, hydrALAZINE, ondansetron **OR** ondansetron, polyethylene glycol, acetaminophen **OR** acetaminophen, melatonin    Allergies:  Elemental sulfur, Ibuprofen, and Penicillins    Social History:   Social History     Socioeconomic History    Marital status:      Spouse name: Not on file    Number of children: Not on file    Years of education: Not on file    Highest education level: Not on file   Occupational History    Not on file   Tobacco Use    Smoking status: Former Smoker     Years: 20.00     Quit date: 1987     Years since quittin.7    Smokeless tobacco: Never Used   Substance and Sexual Activity    Alcohol use: No    Drug use: No    Sexual activity: Not on file   Other Topics Concern    Not on file   Social History Narrative    Not on file     Social Determinants of Health     Financial Resource Strain:     Difficulty of Paying Living Expenses: Not on file   Food Insecurity:     Worried About Running Out of Food in the Last Year: Not on file    Kenan of Food in the Last Year: Not on file   Transportation Needs:  Lack of Transportation (Medical): Not on file    Lack of Transportation (Non-Medical): Not on file   Physical Activity:     Days of Exercise per Week: Not on file    Minutes of Exercise per Session: Not on file   Stress:     Feeling of Stress : Not on file   Social Connections:     Frequency of Communication with Friends and Family: Not on file    Frequency of Social Gatherings with Friends and Family: Not on file    Attends Restoration Services: Not on file    Active Member of [address] [place] [address] or Organizations: Not on file    Attends Club or Organization Meetings: Not on file    Marital Status: Not on file   Intimate Partner Violence:     Fear of Current or Ex-Partner: Not on file    Emotionally Abused: Not on file    Physically Abused: Not on file    Sexually Abused: Not on file   Housing Stability:     Unable to Pay for Housing in the Last Year: Not on file    Number of [place] in the Last Year: Not on file    Unstable Housing in the Last Year: Not on file       Family History:   No family history on file. . Otherwise non-pertinent to the chief complaint. REVIEW OF SYSTEMS:    As mentioned in HPI, all other systems negative. PHYSICAL EXAM:    Vitals:    BP (!) 126/44   Pulse 73   Temp 97.7 °F (36.5 °C) (Temporal)   Resp 17   Ht 4' 11\" (1.499 m)   Wt 130 lb 8 oz (59.2 kg)   SpO2 98%   BMI 26.36 kg/m²   Constitutional: The patient is awake, alert, and oriented. Skin: Warm and dry. No jaundice. HEENT: Eyes show round, and reactive pupils. Moist mucous membranes, no ulcerations, no thrush. On 4 L of oxygen  Neck: Supple to movements. Chest: There to auscultation anteriorly  Cardiovascular: S1 and S2 are rhythmic and regular. No murmurs appreciated. Abdomen: Soft nontender  Extremities: No clubbing, no cyanosis, no edema.   Musculoskeletal: no Gross abnormalities  Neurological alert and oriented x3  Lines: peripheral      CBC+dif:  Recent Labs     05/01/22  0714 05/01/22  1579 05/02/22  0549 05/02/22  0549 05/03/22 0527   WBC 9.6  --  11.1  --  14.5*   HGB 9.8*   < > 10.9*   < > 10.3*   HCT 32.7*   < > 37.0   < > 34.1   MCV 87.2   < > 87.5   < > 85.3      < > 213   < > 220    < > = values in this interval not displayed. No results found for: CRP  No results found for: CRPHS  No results found for: SEDRATE  Lab Results   Component Value Date    ALT 21 04/27/2022    AST 38 (H) 04/27/2022    ALKPHOS 154 (H) 04/27/2022    BILITOT 0.4 04/27/2022     Lab Results   Component Value Date     05/03/2022    K 4.2 05/03/2022    CL 98 05/03/2022    CO2 36 05/03/2022    BUN 14 05/03/2022    CREATININE 0.9 05/03/2022    GFRAA >60 05/03/2022    LABGLOM 59 05/03/2022    GLUCOSE 98 05/03/2022    GLUCOSE 102 04/06/2012    PROT 6.6 04/27/2022    LABALBU 3.3 04/27/2022    LABALBU 4.7 04/06/2012    CALCIUM 9.2 05/03/2022    BILITOT 0.4 04/27/2022    ALKPHOS 154 04/27/2022    AST 38 04/27/2022    ALT 21 04/27/2022       Lab Results   Component Value Date    PROTIME 11.0 08/06/2021    INR 0.9 08/06/2021       Lab Results   Component Value Date    TSH 0.822 04/26/2022       Lab Results   Component Value Date    COLORU Yellow 04/27/2022    PHUR 5.5 04/27/2022    WBCUA 0-1 04/27/2022    RBCUA >20 04/27/2022    RBCUA 0-1 02/14/2014    BACTERIA RARE 04/27/2022    CLARITYU Clear 04/27/2022    SPECGRAV 1.020 04/27/2022    LEUKOCYTESUR TRACE 04/27/2022    UROBILINOGEN 0.2 04/27/2022    BILIRUBINUR Negative 04/27/2022    BLOODU LARGE 04/27/2022    GLUCOSEU Negative 04/27/2022       No results found for: GJN6JOJ, BEART, P2GGHFXB, PHART, THGBART, CJZ4NAF, PO2ART, PEG9QIH  Radiology:  XR CHEST PORTABLE   Final Result   Increase in opacifications right lower lung with probable effusion component   small to moderate along with persistent right upper lobe involvement of   airspace disease         XR CHEST PORTABLE   Final Result   1.  No interval change in extensive multifocal pneumonia within the right lung more prominent within the right upper lobe   2. Very minimal partial interval clearing of the left perihilar pneumonia. 3. Cardiomegaly         XR CHEST PORTABLE   Final Result      Persistent right upper and lower lobe alveolar opacity with new left   perihilar asymmetric alveolar compatible with worsening pneumonia. CT CHEST WO CONTRAST   Final Result   1. Large consolidation suggestive of pneumonia in the right upper lobe   posteriorly. Multiple scattered bilateral ill-defined opacities are   otherwise present suggesting additional infiltrates. Septal thickening   asymmetric rightward consistent with interstitial edema. 2. Right larger than left pleural effusions. 3. Cardiomegaly. 4. Focal lower esophageal density suggesting large pill. 5. Mild ascites. XR CHEST PORTABLE   Final Result   Worsening right upper lobe consolidation. Continued follow-up recommended. US DUP LOWER EXTREMITIES BILATERAL VENOUS   Final Result   Within the visualized vessels there is no evidence for deep venous   thrombosis               XR CHEST PORTABLE   Final Result   Multifocal bilateral pneumonia, right greater than left. Cardiomegaly. XR CHEST PORTABLE    (Results Pending)       Microbiology:  Pending  No results for input(s): BC in the last 72 hours. No results for input(s): ORG in the last 72 hours. No results for input(s): Yanick Monica in the last 72 hours. No results for input(s): STREPNEUMAGU in the last 72 hours. No results for input(s): LP1UAG in the last 72 hours. No results for input(s): ASO in the last 72 hours. No results for input(s): CULTRESP in the last 72 hours. Assessment:  · Acute hypoxic respiratory failure/multifocal pneumonia: She is clinically improving. oxygenation has significantly improved since admission. She is on day 6 of IV meropenem. Completed 6 days of IV vancomycin. Discontinue today. Plan:    · Continue IV meropenem for 1 more day.   · Will follow with you    Thank you for having us see this patient in consultation. I will be discussing this case with the treating physicians.       Electronically signed by Arabella Alva MD on 5/3/2022 at 4:53 PM

## 2022-05-03 NOTE — PROGRESS NOTES
Pt has removed the biPap several times since RT placed her on.  Pt refused to put back on, pt placed on 5L nasal cannula, pt O2 98%

## 2022-05-03 NOTE — PROGRESS NOTES
Midline Placement 5/3/2022    Product number: [id number]   Lot Number: [id number]      Ultrasound: yes   Left Brachial vein:                Upper Arm Circumference: 24cm    Size: 4.5fr    Exposed Length: 3cm    Internal Length: 12cm   Cut: 0cm   Vein Measurement: 0.45cm    Honey Mcadams RN  5/3/2022  5:43 PM

## 2022-05-03 NOTE — PROGRESS NOTES
[facility]  Department of Pulmonary, Critical Care and Sleep Medicine  [address] [facility]  Department of Internal Medicine  Progress Note    SUBJECTIVE:    Patient seen and examined this morning. Overall no acute issues overnight. OBJECTIVE:  Vitals:    05/03/22 0735 05/03/22 0739 05/03/22 0812 05/03/22 1010   BP: (!) 123/48 (!) 139/44     Pulse: 78      Resp: 28      Temp: 97.8 °F (36.6 °C)      TempSrc: Temporal      SpO2: 95%  98%    Weight:       Height:    4' 11\" (1.499 m)     Constitutional: Alert,  EENT: EOMI YAEL. Mucous membranes dry  Neck: No thyromegaly. Trachea was midline. Respiratory: Scattered rales bilaterally mid to base  Cardiovascular: Regular, No murmur. No rubs. Pulses:  Equal bilaterally. Abdomen: Soft without organomegaly. No rebound, rigidity. No guarding. Lymphatic: No lymphadenopathy. Musculoskeletal: Without weakness or gross deficits  Extremities:  No lower extremity edema. Reflexes appear adequate. Skin:  Warm and dry. No skin rashes. Neurological/Psychiatric: No acute psychosis. Cranial nerves are intact. DATA:    Monitor Strips:  Reviewed & discusses with technical team. No changes noted.     RADIOLOGY:  No new chest x-ray today    CBC with Differential:    Lab Results   Component Value Date    WBC 14.5 05/03/2022    RBC 4.00 05/03/2022    HGB 10.3 05/03/2022    HCT 34.1 05/03/2022     05/03/2022    MCV 85.3 05/03/2022    MCH 25.8 05/03/2022    MCHC 30.2 05/03/2022    RDW 17.5 05/03/2022    SEGSPCT 62 02/18/2014    LYMPHOPCT 6.2 04/27/2022    MONOPCT 14.1 04/27/2022    EOSPCT 4 10/11/2010    BASOPCT 0.2 04/27/2022    MONOSABS 2.07 04/27/2022    LYMPHSABS 0.91 04/27/2022    EOSABS 0.04 04/27/2022    BASOSABS 0.03 04/27/2022     CMP:    Lab Results   Component Value Date     05/03/2022    K 4.2 05/03/2022    CL 98 05/03/2022    CO2 36 05/03/2022    BUN 14 05/03/2022    CREATININE 0.9

## 2022-05-03 NOTE — CARE COORDINATION
Met with pt and family at bedside, discussed LTAC and gave options [facility] and [facility]. Pt is agreeable and would like to stay here. Referral made to [facility], await assessment. 3:30pm spoke with [facility], pt has criteria, no beds in [facility], family does not want her to go to otf(too far to drive), [facility] to follow, and re-evaluate tomorrow to see if pt continues to meet criteria. New ID consult today. Cal Fuller, MSW, LSW

## 2022-05-03 NOTE — PROGRESS NOTES
 Vancomycin has been discontinued   [address] to [facility] [address] Physician to re-consult pharmacy if future dosing is needed    Thank you for the consult,    Chiki Marte, PharmD  Pharmacy Resident  P: [phone]      Pharmacy Consultation Note  (Antibiotic Dosing and Monitoring)    Initial consult date: 4/27/22  Consulting physician/provider: Dr. Chiki Watkins  Drug: Vancomycin  Indication: PNA    Age/  Gender Height Weight IBW  Allergy Information   89 y.o./female 4' 11\" (149.9 cm) 145 lb 4.5 oz (65.9 kg)     Ideal body weight: 48.8 kg (107 lb 8.4 oz)  Adjusted ideal body weight: 52.9 kg (116 lb 11.4 oz)   Elemental sulfur, Ibuprofen, and Penicillins      Renal Function:  Recent Labs     05/01/22  0714 05/02/22  1047 05/03/22  0527   BUN 17 15 14   CREATININE 1.0 0.9 0.9       Intake/Output Summary (Last 24 hours) at 5/3/2022 1251  Last data filed at 5/3/2022 0953  Gross per 24 hour   Intake 890 ml   Output 2375 ml   Net -1485 ml       Vancomycin Monitoring:  Trough:  No results for input(s): VANCOTROUGH in the last 72 hours. Random:    Recent Labs     05/01/22  0714   VANCORANDOM 15.0     Recent vancomycin administrations                   vancomycin (VANCOCIN) 750 mg in dextrose 5 % 250 mL IVPB (mg) 750 mg New Bag 05/01/22 1520    vancomycin (VANCOCIN) 750 mg in dextrose 5 % 250 mL IVPB (mg) 750 mg New Bag 04/30/22 1819    vancomycin 1000 mg IVPB in 250 mL D5W addavial (mg) 1,000 mg New Bag 04/29/22 1520                  Assessment:  · Patient is a 80 y.o. female who has been initiated on vancomycin  Estimated Creatinine Clearance: 35 mL/min (based on SCr of 0.9 mg/dL). · To dose vancomycin, pharmacy will be utilizing dosing based off of levels because of patient's renal impairment/insufficiency   · 4/27: Current sCr above baseline up to 1.5 from previous baseline ~1  · 4/28: WBC, Scr 1.4, MRSA nares swab pending  · 4/29:  WBC 11.4, Scr 1.4, vancomycin level at 05:17 = 8 mcg/mL  · 4/30: WBC 9.6, Scr 1.1, vancomycin level at 06:47 = 13.9 mcg/mL  · 5/1: sCr stable, 12h post dose level 15 demonstrating steady vancomycin clerance  · 5/2: WBC WNL;  Scr 0.9; day #6 vancomycin; day #5 meropenem  · 5/3: WBC elevated again at 14.5; current regimen predicts eAUC <400; day #7 vancomycin and #6 meropenem    Plan:  · Increase to vancomycin 1000 mg Q24h  · Vancomycin level as needed  · Will continue to monitor renal function     Zee Mccullough, PharmD  Pharmacy Resident  P: [phone]  5/3/2022 12:51 PM

## 2022-05-03 NOTE — PLAN OF CARE
Problem: Skin/Tissue Integrity  Goal: Absence of new skin breakdown  Description: 1. Monitor for areas of redness and/or skin breakdown  2. Assess vascular access sites hourly  3. Every 4-6 hours minimum:  Change oxygen saturation probe site  4. Every 4-6 hours:  If on nasal continuous positive airway pressure, respiratory therapy assess nares and determine need for appliance change or resting period.   Outcome: Completed

## 2022-05-03 NOTE — PROGRESS NOTES
tablet 20 mEq, 20 mEq, Oral, Daily, Jeffery Hashimoto, DO, 20 mEq at 05/03/22 7595    perflutren lipid microspheres (DEFINITY) injection 1.65 mg, 1.5 mL, IntraVENous, ONCE PRN, Alfonso Wilkins PA-C    hydrALAZINE (APRESOLINE) injection 10 mg, 10 mg, IntraVENous, Q6H PRN, [facility] SHAHBAZ Montalvo    ipratropium-albuterol (DUONEB) nebulizer solution 1 ampule, 1 ampule, Inhalation, Q4H WA, Shazia Hernández, DO, 1 ampule at 05/03/22 6885    amLODIPine (NORVASC) tablet 10 mg, 10 mg, Oral, Daily, Delaney Billing, APRN - CNP, 10 mg at 05/03/22 9781    aspirin EC tablet 81 mg, 81 mg, Oral, Daily, Delaney Billing, APRN - CNP, 81 mg at 05/03/22 6838    atorvastatin (LIPITOR) tablet 20 mg, 20 mg, Oral, Daily, Delaney Billing, APRN - CNP, 20 mg at 05/03/22 8506    [Held by provider] cloNIDine (CATAPRES) tablet 0.1 mg, 0.1 mg, Oral, BID, Delaney Billing, APRN - CNP, 0.1 mg at 04/26/22 2316    [Held by provider] clopidogrel (PLAVIX) tablet 75 mg, 75 mg, Oral, Daily, Delaney Billing, APRN - CNP, 75 mg at 05/01/22 0813    polycarbophil (FIBERCON) tablet 625 mg, 625 mg, Oral, BID, Edlaney Billing, APRN - CNP, 625 mg at 05/03/22 2702    levothyroxine (SYNTHROID) tablet 25 mcg, 25 mcg, Oral, Daily, Delaney Billing, APRN - CNP, 25 mcg at 05/03/22 0630    rOPINIRole (REQUIP) tablet 4 mg, 4 mg, Oral, Nightly, Delaney Billing, APRN - CNP, 4 mg at 05/02/22 2142    vitamin D3 (CHOLECALCIFEROL) tablet 400 Units, 400 Units, Oral, Daily, Delaney Billing, APRN - CNP, 400 Units at 05/02/22 1649    sodium chloride flush 0.9 % injection 5-40 mL, 5-40 mL, IntraVENous, 2 times per day, Delaney Billing, APRN - CNP, 10 mL at 05/03/22 9482    sodium chloride flush 0.9 % injection 5-40 mL, 5-40 mL, IntraVENous, PRN, Delaney Billing, APRN - CNP    0.9 % sodium chloride infusion, , IntraVENous, PRN, Delaney Billing, APRN - CNP    ondansetron (ZOFRAN-ODT) disintegrating tablet 4 mg, 4 mg, Oral, Q8H PRN **OR** ondansetron (ZOFRAN) injection 4 mg, 4 mg, IntraVENous, Q6H PRN, Lum Page, APRN - CNP    polyethylene glycol (GLYCOLAX) packet 17 g, 17 g, Oral, Daily PRN, Lum Page, APRN - CNP    acetaminophen (TYLENOL) tablet 650 mg, 650 mg, Oral, Q6H PRN **OR** acetaminophen (TYLENOL) suppository 650 mg, 650 mg, Rectal, Q6H PRN, Lum Page, APRN - CNP    heparin (porcine) injection 5,000 Units, 5,000 Units, SubCUTAneous, 3 times per day, Lum Page, APRN - CNP, 5,000 Units at 05/03/22 0630    melatonin tablet 3 mg, 3 mg, Oral, Nightly PRN, Bean Law, DO, 3 mg at 05/02/22 0013    Physical Exam:  BP (!) 139/44   Pulse 78   Temp 97.8 °F (36.6 °C) (Temporal)   Resp 28   Ht 4' 11\" (1.499 m)   Wt 130 lb 8 oz (59.2 kg)   SpO2 98%   BMI 26.36 kg/m²   Wt Readings from Last 3 Encounters:   05/03/22 130 lb 8 oz (59.2 kg)   09/12/20 133 lb 0.1 oz (60.3 kg)   05/25/16 134 lb (60.8 kg)     Appearance: Frail-appearing elderly female, on high flow nasal cannula  Skin: Intact, no rash  Head: Normocephalic, atraumatic  Eyes: EOMI, no conjunctival erythema  ENMT: No pharyngeal erythema, MMM, no rhinorrhea  Neck: Supple, no elevated JVP, no carotid bruits  Lungs: Diminished, scattered rales  Cardiac: PMI nondisplaced, Regular rhythm with a normal rate, S1 & S2 normal, no murmurs  Abdomen: Soft, nontender, +bowel sounds  Extremities: Moves all extremities x 4, no lower extremity edema  Neurologic: No focal motor deficits apparent, normal mood and affect  Peripheral Pulses: Intact posterior tibial pulses bilaterally    Intake/Output:    Intake/Output Summary (Last 24 hours) at 5/3/2022 0838  Last data filed at 5/3/2022 0516  Gross per 24 hour   Intake 650 ml   Output 2375 ml   Net -1725 ml     No intake/output data recorded.     Laboratory Tests:  Recent Labs     05/01/22  0714 05/02/22  1047 05/03/22  0527    138 139   K 4.2 4.2 4.2   CL 91* 90* 98   CO2 39* 44* 36*   BUN 17 15 14   CREATININE 1.0 0.9 0.9   GLUCOSE 118* 109* 98   CALCIUM 9.1 9.4 9.2     Lab Results   Component Value Date    MG 2.0 2022     No results for input(s): ALKPHOS, ALT, AST, PROT, BILITOT, BILIDIR, LABALBU in the last 72 hours. Recent Labs     22  0714 22  0549 22  0527   WBC 9.6 11.1 14.5*   RBC 3.75 4.23 4.00   HGB 9.8* 10.9* 10.3*   HCT 32.7* 37.0 34.1   MCV 87.2 87.5 85.3   MCH 26.1 25.8* 25.8*   MCHC 30.0* 29.5* 30.2*   RDW 17.5* 17.2* 17.5*    213 220   MPV 11.0 11.1 11.5     Lab Results   Component Value Date    CKTOTAL 115 2014    CKMB 3.1 2014    TROPONINI <0.01 2020    TROPONINI <0.01 2014     Lab Results   Component Value Date    INR 0.9 2021    INR 1.0 2015    INR 1.1 2014    PROTIME 11.0 2021    PROTIME 11.3 2015    PROTIME 12.1 2014     Lab Results   Component Value Date    TSH 0.822 2022     Lab Results   Component Value Date    LABA1C 5.9 02/15/2014     No results found for: EAG  Lab Results   Component Value Date    CHOL 131 2022    CHOL 187 2021    CHOL 206 (H) 2020     Lab Results   Component Value Date    TRIG 66 2022    TRIG 85 2021    TRIG 112 2020     Lab Results   Component Value Date    HDL 64 2022    HDL 95 2022    HDL 88 2021     Lab Results   Component Value Date    LDLCALC 54 2022    LDLCALC 71 2022    LDLCALC 82 2021     Lab Results   Component Value Date    LABVLDL 13 2022    LABVLDL 17 2022    LABVLDL 17 2021     No results found for: CHOLHDLRATIO  Recent Labs     22  0714 22  0549   PROBNP 4,489* 4,698*       Cardiac Tests:    EK2022: Sinus rhythm 70 bpm.  Left axis. Normal intervals. Possible prior inferior infarct. Possible prior anterior infarct. Telemetry:   Sinus rhythm 60s    Chest X-ray:   22    Impression   1.  No interval change in extensive multifocal pneumonia within the right lung   more prominent usage   Continue current cardiac medication otherwise   Further care per primary, pulmonary   Will be available as needed, please call with questions    Nitesh Vallejo MD, [address] [facility]    NOTE: This report was transcribed using voice recognition software. Every effort was made to ensure accuracy; however, inadvertent computerized transcription errors may be present.

## 2022-05-03 NOTE — PROGRESS NOTES
Comprehensive Nutrition Assessment    Type and Reason for Visit:  Initial (LOS)    Nutrition Recommendations/Plan:   1. Recommend and start Ensure Enlive supplement daily and Magic Cup supplement daily to help meet nutritional needs. Malnutrition Assessment:  Malnutrition Status: Moderate malnutrition (05/03/22 1016)    Context:  Chronic Illness     Findings of the 6 clinical characteristics of malnutrition:  Energy Intake:  75% or less estimated energy requirements for 1 month or longer  Weight Loss:  Unable to assess (d/t lack of weight history)     Body Fat Loss:  Mild body fat loss Orbital,Triceps   Muscle Mass Loss:  Mild muscle mass loss Temples (temporalis),Clavicles (pectoralis & deltoids)  Fluid Accumulation:  No significant fluid accumulation     Strength:  Not Performed    Nutrition Assessment:    Patient adm w/ SOB and leg swelling ; s/p RRT 4/27 ; noted acute on chronic hypoxic/hypercapneic respiratory failure and PNA ; CARLOS on CKD ; hx of CHF and TAVR ; ammonia levels WNL ; pt also meets criteria for moderate malnutrition ; will start ONS    Nutrition Related Findings:    -I&Os (-7.6 L), 1+ edema, active BS, [place], A&O x 4, redness to sacrum, muscle/fat wasting Wound Type: None       Current Nutrition Intake & Therapies:    Average Meal Intake: 51-75%     ADULT DIET; Regular; Low Sodium (2 gm)    Anthropometric Measures:  Height: 4' 11\" (149.9 cm)  Ideal Body Weight (IBW): 95 lbs (43 kg)    Admission Body Weight: 145 lb (65.8 kg) (4/26, bedscale)  Current Body Weight: 130 lb (59 kg) (5/3, bedscale), 136.8 % IBW. Weight Source: Bed Scale  Current BMI (kg/m2): 26.2  Usual Body Weight:  (UTO ; no weights available in EMR hx from previous encounters)                       BMI Categories: Overweight (BMI 25.0-29. 9)    Estimated Daily Nutrient Needs:  Energy Requirements Based On: Formula  Weight Used for Energy Requirements: Current  Energy (kcal/day): 4131-7307 ( x 1.2 SF)  Weight Used for Protein Requirements: Ideal  Protein (g/day): 50-60 (1.2-1.4g/kg IBW)  Method Used for Fluid Requirements: 1 ml/kcal  Fluid (ml/day): 9328-3047    Nutrition Diagnosis:   · Moderate malnutrition,In context of chronic illness related to inadequate protein-energy intake (2/2 advanced age) as evidenced by poor intake prior to admission,mild muscle loss,mild loss of subcutaneous fat      Nutrition Interventions:   Food and/or Nutrient Delivery: Continue Current Diet,Start Oral Nutrition Supplement  Nutrition Education/Counseling: Education completed  Coordination of Nutrition Care: Continue to monitor while inpatient       Goals:  Previous Goal Met: Progressing toward Goal(s)  Goals: PO intake 75% or greater,by next RD assessment       Nutrition Monitoring and Evaluation:   Behavioral-Environmental Outcomes: None Identified  Food/Nutrient Intake Outcomes: Food and Nutrient Intake,Supplement Intake  Physical Signs/Symptoms Outcomes: Biochemical Data,Chewing or Swallowing,GI Status,Fluid Status or Edema,Hemodynamic Status,Meal Time Behavior,Nutrition Focused Physical Findings,Skin,Weight    Discharge Planning:     Too soon to determine     Edgardo Dhaliwal RD, LD  Contact: [phone]

## 2022-05-03 NOTE — PATIENT CARE CONFERENCE
P Quality Flow/Interdisciplinary Rounds Progress Note        Quality Flow Rounds held on May 3, 2022    Disciplines Attending:  Bedside Nurse, ,  and Nursing Unit Leadership    Amanda Schneider was admitted on 4/26/2022  2:32 PM    Anticipated Discharge Date:  Expected Discharge Date: 05/03/22    Disposition:    Avi Score:  Avi Scale Score: 16    Readmission Risk              Risk of Unplanned Readmission:  14           Discussed patient goal for the day, patient clinical progression, and barriers to discharge.   The following Goal(s) of the Day/Commitment(s) have been identified:  Diagnostics - Report Results and Labs - Report Results      Eugene Terrazas RN  May 3, 2022

## 2022-05-03 NOTE — PROGRESS NOTES
Hospitalist Progress Note    Chief complaint:  Chief Complaint   Patient presents with    Shortness of Breath     Hx of chf sent in by PCP for SOB. patient 85% on RA . Placed on 4 L NC. also C/O bilateral leg swelling    Leg Swelling        Admit date:  4/26/2022    Days in hospital:    7    Synopsis :    80y.o. year old female with a recent TAVR at [facility] October 2021, who was recently seen by her primary care provider with complaint of shortness of breath, and lower extremity edema.  Her primary care provider recommended her go to the ER for further evaluation and diuresis.      Cardiology evaluated the patient     During the course of her stay and RRT was called due to change in mental status, patient's CODE STATUS was changed to DNR CCA.       Pulmonology consulted for BiPAP dependence. A CT of the chest revealed large consolidation suggestive of pneumonia in the right upper lobe posteriorly. Vanco and meropenem was ordered IV. Chest x-ray from 4-29 shows persistent right upper and lower lobe alveolar opacity with new left perihilar asymmetric alveolar  compatible with worsening pneumonia.  Chest x-ray from 4-30 shows no interval change and extensive multifocal pneumonia within the right lung    Assessment and plan:  Principal Problem:    Acute hypoxemic respiratory failure (HCC)  Active Problems:    Chronic congestive heart failure (HCC)    History of transcatheter aortic valve replacement (TAVR)    Moderate protein-calorie malnutrition ([address]  Resolved Problems:    * No resolved hospital problems. *    Assessment:  Acute on chronic congestive heart failure, Hx of TAVR in oct of 2021, Echocardiogram,  LVEF is 55-60 %. Mild concentric LVH. Grade II diastolic dysfunction with elevated LA pressure. Mildly dilated right ventricle with preserved function. The left atrium is moderately dilated.  Mild pulmonary hypertension with a PASP of 42 mm Hg  Pneumonia, Respiratory culture shows abundant polymorphonuclear leukocytes and moderate Gram positive cocci in pairs  Elevated troponin at 4  Acute respiratory failure with hypoxia  CARLOS creatinine, (resolved) creatinine 0.9 today  Hypertension  Hyperlipidemia    Plan:  S/P IV lasix . Started on Diamox   Cont Toprol XL 25 mg, Norvasc 2.5 mg, aspirin 81 mg, Lipitor 20 mg, Catapres 0.1 mg, Plavix 75 mg, Synthroid 25 mg,  Daily weights, Strict intake and output  On Vancomycin IV  On Meropenem IV  We will request infection disease evaluate  BiPAP for nocturnal hours and with naps  Pulmonary and Cardiology consult  Follow-up in congestive heart failure clinic after discharge    DVT prophylaxis: Subcutaneous heparin  CODE STATUS: Patient is a full code  Discharge plan: Patient can be discharged next 3 to 4 days to home with and without home health.,  Pending clinical improvement, pending work-up and clearance by consulting services. Subjective:   No acute events overnight, this morning patient seen at bedside no acute distress. Patient is not reporting any chest pain. No obvious difficulty in breathing at this point and no worsening of any dyspnea reported. No abdominal pain or discomfort. No dizziness. No new complaint. All questions answered in detail at bedside. Plan discussed with nursing staff in detail.     Objective:    Physical examination:  VS: BP (!) 139/44   Pulse 78   Temp 97.8 °F (36.6 °C) (Temporal)   Resp 28   Ht 4' 11\" (1.499 m)   Wt 130 lb 8 oz (59.2 kg)   SpO2 98%   BMI 26.36 kg/m²   I/O:     Intake/Output Summary (Last 24 hours) at 5/3/2022 1325  Last data filed at 5/3/2022 3079  Gross per 24 hour   Intake 890 ml   Output 2375 ml   Net -1485 ml     General Appearance: alert and oriented to person, place and time, in no acute distress  HEENT: normocephalic and atraumatic, pupils equal, round, and reactive to light, Ssupple and non-tender without mass, no thyromegaly   Cardiovascular: normal rate, regular rhythm, normal S1 and S2, no murmurs, rubs, clicks, or gallops, distal pulses intact, no carotid bruits, no JVD  Pulmonary/Chest: clear to auscultation bilaterally- no wheezes, rales or rhonchi, normal air movement, no respiratory distress  Abdomen: soft, non-tender, non-distended, normal bowel sounds, no masses   Extremities: no cyanosis, clubbing or edema, pulse   Musculoskeletal: normal range of motion, no joint swelling, deformity or tenderness  Neurological: alert, oriented, normal speech, no focal findings or movement disorder noted  Skin: warm and dry, no rash or erythema    Medications:  Scheduled Meds:   [START ON 5/4/2022] bumetanide  1 mg Oral Daily    vancomycin  1,000 mg IntraVENous Q24H    meropenem  1,000 mg IntraVENous Q12H    losartan  25 mg Oral Daily    metoprolol succinate  25 mg Oral Daily    pantoprazole  40 mg Oral QAM AC    potassium bicarb-citric acid  20 mEq Oral Daily    ipratropium-albuterol  1 ampule Inhalation Q4H WA    amLODIPine  10 mg Oral Daily    aspirin  81 mg Oral Daily    atorvastatin  20 mg Oral Daily    [Held by provider] cloNIDine  0.1 mg Oral BID    [Held by provider] clopidogrel  75 mg Oral Daily    polycarbophil  625 mg Oral BID    levothyroxine  25 mcg Oral Daily    rOPINIRole  4 mg Oral Nightly    vitamin D3  400 Units Oral Daily    sodium chloride flush  5-40 mL IntraVENous 2 times per day    heparin (porcine)  5,000 Units SubCUTAneous 3 times per day       PRN Meds:  guaiFENesin-dextromethorphan, benzonatate, perflutren lipid microspheres, hydrALAZINE, sodium chloride flush, sodium chloride, ondansetron **OR** ondansetron, polyethylene glycol, acetaminophen **OR** acetaminophen, melatonin    IV:   sodium chloride         LABS:  Recent Results (from the past 24 hour(s))   CBC    Collection Time: 05/03/22  5:27 AM   Result Value Ref Range    WBC 14.5 (H) 4.5 - 11.5 E9/L    RBC 4.00 3.50 - 5.50 E12/L    Hemoglobin 10.3 (L) 11.5 - 15.5 g/dL Hematocrit 34.1 34.0 - 48.0 %    MCV 85.3 80.0 - 99.9 fL    MCH 25.8 (L) 26.0 - 35.0 pg    MCHC 30.2 (L) 32.0 - 34.5 %    RDW 17.5 (H) 11.5 - 15.0 fL    Platelets 364 810 - 421 E9/L    MPV 11.5 7.0 - 12.0 fL   Basic Metabolic Panel w/ Reflex to MG    Collection Time: 05/03/22  5:27 AM   Result Value Ref Range    Sodium 139 132 - 146 mmol/L    Potassium reflex Magnesium 4.2 3.5 - 5.0 mmol/L    Chloride 98 98 - 107 mmol/L    CO2 36 (H) 22 - 29 mmol/L    Anion Gap 5 (L) 7 - 16 mmol/L    Glucose 98 74 - 99 mg/dL    BUN 14 6 - 23 mg/dL    CREATININE 0.9 0.5 - 1.0 mg/dL    GFR Non-African American 59 >=60 mL/min/1.73    GFR African American >60     Calcium 9.2 8.6 - 10.2 mg/dL       RADIOLOGY:  Echo Complete    Result Date: 4/28/2022  Transthoracic Echocardiography Report (TTE)  Demographics   Patient Name    Enid Fitzgerald  Gender            Female                  F   Medical Record  [id number]     Room Number       6502  Number   Account #       [de-identified]    Procedure Date    04/28/2022   Corporate ID                 Ordering                               Physician   Accession       [id number]   Referring  Number                       Physician   Date of Birth   12/09/1931   Sonographer       Alysa DE LOS SANTOS   Age             80 year(s)   Interpreting      [address] [place] [address]                               Physician         Physician Cardiology                                                 Amarjit Wynn MD                                Any Other  Procedure Type of Study   TTE procedure:Echo Complete W/Doppler & Color Flow. Procedure Date Date: 04/28/2022 Start: 10:18 AM Study Location: Portable Technical Quality: Adequate visualization Indications:LV function and S/P TAVR. Patient Status: Routine Height: 59 inches Weight: 145 pounds BSA: 1.61 m^2 BMI: 29.29 kg/m^2 Rhythm: Within normal limits HR: 76 bpm BP: 168/66 mmHg  Findings   Left Ventricle  Normal left ventricular chamber size.   Normal left ventricular systolic function. Visually estimated LVEF is 55-60 %. Mild concentric LVH. No wall motion abnormalities. Grade II diastolic dysfunction with elevated LA pressure. Right Ventricle  Mildly dilated right ventricle with preserved function. Left Atrium  The left atrium is moderately dilated. Right Atrium  Normal right atrial size   Mitral Valve  Physiologic and/or trace mitral regurgitation is present. Mitral annular calcification is present. Mild mitral stenosis. Mean gradient is 5.5 mm Hg. Tricuspid Valve  The tricuspid valve appears structurally normal.  Mild tricuspid regurgitation. Aortic Valve  There is a bioprosthetic aortic valve in place that appears well seated  with acceptable function and gradients. Trace regurgitation present. Pulmonic Valve  Normal pulmonic valve structure and function. Physiologic and/or trace pulmonic regurgitation present. Pericardial Effusion  No evidence for hemodynamically significant pericardial effusion. Pleural Effusion  No evidence of pleural effusion. Aorta  Normal aortic root and ascending aorta. Miscellaneous  Normal Inferior Vena Cava diameter. Inferior Vena Cava, <50% respiratory variation. Conclusions   Summary  Normal left ventricular chamber size. Normal left ventricular systolic function. Visually estimated LVEF is 55-60 %. Mild concentric LVH. No wall motion abnormalities. Grade II diastolic dysfunction with elevated LA pressure. Mildly dilated right ventricle with preserved function. The left atrium is moderately dilated. Normal right atrial size. Mild pulmonary hypertension with a PASP of 42 mm Hg. There is a bioprosthetic aortic valve in place that appears well seated  with acceptable function and gradients. Trace regurgitation present. No comparison study available.    Signature   ----------------------------------------------------------------  Electronically signed by Hayden Leon MD(Interpreting  physician) on 04/28/2022 04:05 PM ----------------------------------------------------------------  M-Mode/2D Measurements & Calculations   LV Diastolic     LV Systolic Dimension: 2.4 cm   AV Cusp Separation: 1.3  Dimension: 3.7   LV Volume Diastolic: 04.2 ml    cmAO Root Dimension: 2.3  cm               LV Volume Systolic: 56.4 ml     cm  LV FS:35.1 %     LV EDV/LV EDV Index: 58.7 NX/76  LV PW Diastolic: SM/M^1VM ESV/LV ESV Index: 19.3  1.3 cm           ml/12ml/ m^2  LV PW Systolic:  EF Calculated: 67.1 %           RV Diastolic Dimension:  1.5 cm           LV Mass Index: 103 l/min*m^2    3. 1 cm  Septum           LV Length: 7.2 cm  Diastolic: 1.3                                   Ascending Aorta: 1.8 cm  cm               LVOT: 2 cm                      LA volume/Index: 71.4 ml  Septum Systolic:                                 /44.36ml/m^2  1. 4 cm                                           RA Area: 17.3 cm^2  CO: 6.92 l/min  CI: 4.3 l/m*m^2  LV Mass: 166.5 g  Doppler Measurements & Calculations   MV Peak E-Wave:   AV Peak Velocity: 1.99 m/s     LVOT Peak Velocity: 1.3  1.73 m/s          AV Peak Gradient: 15.86 mmHg   m/s  MV Peak A-Wave:   AV Mean Velocity: 1.4 m/s      LVOT Mean Velocity: 0.96  1.5 m/s           AV Mean Gradient: 8.8 mmHg     m/s  MV E/A Ratio:     AV VTI: 40.1 cm                LVOT Peak Gradient: 6.8  1.15              AV Area (Continuity):2.27 cm^2 mmHgLVOT Mean Gradient: 4  MV Peak Gradient:                                mmHg  16.5 mmHg         LVOT VTI: 29 cm                Estimated RAP:8 mmHg  MV Mean Gradient:  5.6 mmHg          Estimated PASP: 42.32 mmHg  MV Mean Velocity: Pulm. Vein A Reversal          TR Velocity:2.93 m/s  1.08 m/s          Duration:133.8 msec            TR Gradient:34.32 mmHg  MV Deceleration   Pulm. Vein D Velocity:0.69  Time: 183 msec    m/sPulm. Vein A Reversal  MV P1/2t: 85.8    Velocity:0.3 m/s  msec              Pulm.  Vein S Velocity: 0.78    [place] ED Velocity: 0.43 m/s  MVA by PHT:2.56   m/s  cm^2 MV Area  (continuity): 1.7  cm^2  [id number]    CT CHEST WO CONTRAST    Result Date: 4/28/2022  EXAMINATION: CT OF THE CHEST WITHOUT CONTRAST 4/27/2022 8:40 pm TECHNIQUE: CT of the chest was performed without the administration of intravenous contrast. Multiplanar reformatted images are provided for review. Dose modulation, iterative reconstruction, and/or weight based adjustment of the mA/kV was utilized to reduce the radiation dose to as low as reasonably achievable. COMPARISON: None. HISTORY: ORDERING SYSTEM PROVIDED HISTORY: hypoxia TECHNOLOGIST PROVIDED HISTORY: Reason for exam:->hypoxia What reading provider will be dictating this exam?->CRC FINDINGS: Mediastinum: Mild adenopathy such as pretracheal and subcarinal areas, nonspecific but may relate to pulmonary findings. Cardiomegaly. Aortic stent graft. Atherosclerotic calcifications including coronary arteries. Lower esophagus has focal density, may represent retained capsule on the order of 2 cm length, or other focal ingested hyperdense substance. Lungs/pleura: Moderate right pleural effusion and small on the left. Adjacent atelectasis. There are mild ill-defined focal opacity scattered in the lungs suggesting inflammatory etiology. Most evident; however, is consolidation with air bronchograms and adjacent ill-defined opacification present in the right upper lobe. Overall findings are suggestive of multifocal infiltrates. Neoplasm is less likely but not able to be excluded. Attention on follow-up imaging is recommended as clinically warranted. Upper Abdomen: Limited evaluation. Suggestion of mild ascites. Soft Tissues/Bones: Low bone density suggested and mild spinal degenerative changes. 1. Large consolidation suggestive of pneumonia in the right upper lobe posteriorly.   Multiple scattered bilateral ill-defined opacities are otherwise present suggesting additional infiltrates. Septal thickening asymmetric rightward consistent with interstitial edema. 2. Right larger than left pleural effusions. 3. Cardiomegaly. 4. Focal lower esophageal density suggesting large pill. 5. Mild ascites. XR CHEST PORTABLE    Result Date: 5/2/2022  EXAMINATION: ONE XRAY VIEW OF THE CHEST 5/2/2022 8:28 am COMPARISON: Chest x-ray 04/30/2022 HISTORY: ORDERING SYSTEM PROVIDED HISTORY: follow up pna TECHNOLOGIST PROVIDED HISTORY: Reason for exam:->follow up pna What reading provider will be dictating this exam?->CRC FINDINGS: Opacification in the bilateral lungs right greater than left confluent right upper lobe involvement with increased right lower lung opacifications or effusion. Cardiac size remains enlarged. Increase in opacifications right lower lung with probable effusion component small to moderate along with persistent right upper lobe involvement of airspace disease     XR CHEST PORTABLE    Result Date: 4/30/2022  EXAMINATION: ONE XRAY VIEW OF THE CHEST 4/30/2022 10:53 am COMPARISON: Previous chest x-ray of 04/29/2022 and previous CT scan of the chest of 04/27/2022 HISTORY: ORDERING SYSTEM PROVIDED HISTORY: follow up pna TECHNOLOGIST PROVIDED HISTORY: Reason for exam:->follow up pna What reading provider will be dictating this exam?->CRC FINDINGS: The heart is enlarged. Multifocal bilateral pneumonia is again noted more prominent within the right upper lobe. The pneumonic infiltrate seen throughout the right lung are unchanged. There has been minimal interval clearing of the left perihilar airspace disease. There is a small right pleural effusion. 1. No interval change in extensive multifocal pneumonia within the right lung more prominent within the right upper lobe 2. Very minimal partial interval clearing of the left perihilar pneumonia.  3. Cardiomegaly     XR CHEST PORTABLE    Result Date: 4/29/2022  EXAMINATION: ONE XRAY VIEW OF THE CHEST 4/29/2022 9:07 am COMPARISON: 04/27/2022 HISTORY: ORDERING SYSTEM PROVIDED HISTORY: compare to previous, sob TECHNOLOGIST PROVIDED HISTORY: Reason for exam:->compare to previous, sob What reading provider will be dictating this exam?->CRC FINDINGS: Cardiomegaly is noted. There are findings compatible with a TAVR. There is some improvement in the overall density of the peripheral right upper lobe consolidation however there is new left perihilar alveolar opacity and persistent right lower lobe alveolar opacity with possible effusion. No pneumothorax. Bone density is diminished. Persistent right upper and lower lobe alveolar opacity with new left perihilar asymmetric alveolar compatible with worsening pneumonia. XR CHEST PORTABLE    Result Date: 4/27/2022  EXAMINATION: ONE XRAY VIEW OF THE CHEST 4/27/2022 7:48 am COMPARISON: 04/26/2022 HISTORY: ORDERING SYSTEM PROVIDED HISTORY: AMS,hypoxia TECHNOLOGIST PROVIDED HISTORY: Reason for exam:->AMS,hypoxia What reading provider will be dictating this exam?->CRC FINDINGS: EKG leads overlie the chest.  Valve replacement hardware again identified. Cardiac silhouette is mildly prominent but unchanged. Worsening consolidation at the base of the right upper lobe. Other patchy parenchymal opacities most notable towards the right base appear unchanged. No pneumothorax or other change. Worsening right upper lobe consolidation. Continued follow-up recommended. XR CHEST PORTABLE    Result Date: 4/26/2022  EXAMINATION: ONE XRAY VIEW OF THE CHEST 4/26/2022 1:41 pm COMPARISON: 11 September 2020 HISTORY: ORDERING SYSTEM PROVIDED HISTORY: SOB, CHF TECHNOLOGIST PROVIDED HISTORY: Reason for exam:->SOB, CHF What reading provider will be dictating this exam?->CRC FINDINGS: Multifocal bilateral pneumonia, right greater than left. TAVR present. Heart is enlarged. Normal pulmonary vascularity.      Multifocal bilateral pneumonia, right greater than left.  Cardiomegaly. US DUP LOWER EXTREMITIES BILATERAL VENOUS    Result Date: 2022  Patient MRN:  [id number] : 1931 Age: 80 years Gender: Female Order Date:  2022 9:09 PM EXAM: US DUP LOWER EXTREMITIES BILATERAL VENOUS NUMBER OF IMAGES:  40 INDICATION:  r/o dvt r/o dvt What reading provider will be dictating this exam?->[facility] COMPARISON: None Within the visualized vessels, there is no evidence for deep venous thrombosis There is good compressibility, there is good augmentation, there is good color flow. Within the visualized vessels there is no evidence for deep venous thrombosis           Electronically signed by Hasmukh Monge MD on 5/3/2022 at 1:25 PM  NOTE: This report was transcribed using voice recognition software. Every effort was made to ensure accuracy; however, inadvertent computerized transcription errors may be present.

## 2022-05-04 LAB
ANION GAP SERPL CALCULATED.3IONS-SCNC: 6 MMOL/L (ref 7–16)
BUN BLDV-MCNC: 18 MG/DL (ref 6–23)
CALCIUM SERPL-MCNC: 9 MG/DL (ref 8.6–10.2)
CHLORIDE BLD-SCNC: 101 MMOL/L (ref 98–107)
CO2: 34 MMOL/L (ref 22–29)
CREAT SERPL-MCNC: 1.1 MG/DL (ref 0.5–1)
GFR AFRICAN AMERICAN: 56
GFR NON-AFRICAN AMERICAN: 47 ML/MIN/1.73
GLUCOSE BLD-MCNC: 107 MG/DL (ref 74–99)
HCT VFR BLD CALC: 32.8 % (ref 34–48)
HEMOGLOBIN: 10 G/DL (ref 11.5–15.5)
MCH RBC QN AUTO: 26.3 PG (ref 26–35)
MCHC RBC AUTO-ENTMCNC: 30.5 % (ref 32–34.5)
MCV RBC AUTO: 86.3 FL (ref 80–99.9)
PDW BLD-RTO: 17.4 FL (ref 11.5–15)
PLATELET # BLD: 225 E9/L (ref 130–450)
PMV BLD AUTO: 11.4 FL (ref 7–12)
POTASSIUM REFLEX MAGNESIUM: 4.8 MMOL/L (ref 3.5–5)
RBC # BLD: 3.8 E12/L (ref 3.5–5.5)
SODIUM BLD-SCNC: 141 MMOL/L (ref 132–146)
WBC # BLD: 17.1 E9/L (ref 4.5–11.5)

## 2022-05-04 PROCEDURE — 97530 THERAPEUTIC ACTIVITIES: CPT

## 2022-05-04 PROCEDURE — 2580000003 HC RX 258: Performed by: EMERGENCY MEDICINE

## 2022-05-04 PROCEDURE — 97535 SELF CARE MNGMENT TRAINING: CPT

## 2022-05-04 PROCEDURE — 85027 COMPLETE CBC AUTOMATED: CPT

## 2022-05-04 PROCEDURE — 2700000000 HC OXYGEN THERAPY PER DAY

## 2022-05-04 PROCEDURE — 97165 OT EVAL LOW COMPLEX 30 MIN: CPT

## 2022-05-04 PROCEDURE — 6370000000 HC RX 637 (ALT 250 FOR IP): Performed by: INTERNAL MEDICINE

## 2022-05-04 PROCEDURE — 36415 COLL VENOUS BLD VENIPUNCTURE: CPT

## 2022-05-04 PROCEDURE — 94640 AIRWAY INHALATION TREATMENT: CPT

## 2022-05-04 PROCEDURE — 94669 MECHANICAL CHEST WALL OSCILL: CPT

## 2022-05-04 PROCEDURE — 80048 BASIC METABOLIC PNL TOTAL CA: CPT

## 2022-05-04 PROCEDURE — 6370000000 HC RX 637 (ALT 250 FOR IP): Performed by: FAMILY MEDICINE

## 2022-05-04 PROCEDURE — 2580000003 HC RX 258: Performed by: INTERNAL MEDICINE

## 2022-05-04 PROCEDURE — 94660 CPAP INITIATION&MGMT: CPT

## 2022-05-04 PROCEDURE — 6360000002 HC RX W HCPCS: Performed by: EMERGENCY MEDICINE

## 2022-05-04 PROCEDURE — 6370000000 HC RX 637 (ALT 250 FOR IP)

## 2022-05-04 PROCEDURE — 99233 SBSQ HOSP IP/OBS HIGH 50: CPT | Performed by: INTERNAL MEDICINE

## 2022-05-04 PROCEDURE — 6360000002 HC RX W HCPCS: Performed by: INTERNAL MEDICINE

## 2022-05-04 PROCEDURE — 97161 PT EVAL LOW COMPLEX 20 MIN: CPT

## 2022-05-04 PROCEDURE — 2140000000 HC CCU INTERMEDIATE R&B

## 2022-05-04 PROCEDURE — 6360000002 HC RX W HCPCS

## 2022-05-04 RX ADMIN — POTASSIUM BICARBONATE 20 MEQ: 782 TABLET, EFFERVESCENT ORAL at 09:49

## 2022-05-04 RX ADMIN — CALCIUM POLYCARBOPHIL 625 MG: 625 TABLET, FILM COATED ORAL at 17:37

## 2022-05-04 RX ADMIN — HEPARIN 100 UNITS: 100 SYRINGE at 21:33

## 2022-05-04 RX ADMIN — LEVOTHYROXINE SODIUM 25 MCG: 0.03 TABLET ORAL at 05:36

## 2022-05-04 RX ADMIN — BUMETANIDE 1 MG: 1 TABLET ORAL at 09:50

## 2022-05-04 RX ADMIN — IPRATROPIUM BROMIDE AND ALBUTEROL SULFATE 1 AMPULE: .5; 2.5 SOLUTION RESPIRATORY (INHALATION) at 09:04

## 2022-05-04 RX ADMIN — HEPARIN SODIUM 5000 UNITS: 10000 INJECTION INTRAVENOUS; SUBCUTANEOUS at 15:41

## 2022-05-04 RX ADMIN — SODIUM CHLORIDE, PRESERVATIVE FREE 10 ML: 5 INJECTION INTRAVENOUS at 21:33

## 2022-05-04 RX ADMIN — Medication 3 MG: at 21:32

## 2022-05-04 RX ADMIN — IPRATROPIUM BROMIDE AND ALBUTEROL SULFATE 1 AMPULE: .5; 2.5 SOLUTION RESPIRATORY (INHALATION) at 20:08

## 2022-05-04 RX ADMIN — MEROPENEM 1000 MG: 1 INJECTION, POWDER, FOR SOLUTION INTRAVENOUS at 11:48

## 2022-05-04 RX ADMIN — METOPROLOL SUCCINATE 25 MG: 25 TABLET, EXTENDED RELEASE ORAL at 09:50

## 2022-05-04 RX ADMIN — CALCIUM POLYCARBOPHIL 625 MG: 625 TABLET, FILM COATED ORAL at 09:50

## 2022-05-04 RX ADMIN — MEROPENEM 1000 MG: 1 INJECTION, POWDER, FOR SOLUTION INTRAVENOUS at 23:48

## 2022-05-04 RX ADMIN — ASPIRIN 81 MG: 81 TABLET, COATED ORAL at 09:50

## 2022-05-04 RX ADMIN — AMLODIPINE BESYLATE 10 MG: 10 TABLET ORAL at 09:50

## 2022-05-04 RX ADMIN — ROPINIROLE HYDROCHLORIDE 4 MG: 2 TABLET, FILM COATED ORAL at 21:32

## 2022-05-04 RX ADMIN — IPRATROPIUM BROMIDE AND ALBUTEROL SULFATE 1 AMPULE: .5; 2.5 SOLUTION RESPIRATORY (INHALATION) at 12:28

## 2022-05-04 RX ADMIN — IPRATROPIUM BROMIDE AND ALBUTEROL SULFATE 1 AMPULE: .5; 2.5 SOLUTION RESPIRATORY (INHALATION) at 16:14

## 2022-05-04 RX ADMIN — SODIUM CHLORIDE, PRESERVATIVE FREE 10 ML: 5 INJECTION INTRAVENOUS at 11:49

## 2022-05-04 RX ADMIN — ATORVASTATIN CALCIUM 20 MG: 20 TABLET, FILM COATED ORAL at 09:49

## 2022-05-04 RX ADMIN — CHOLECALCIFEROL TAB 10 MCG (400 UNIT) 400 UNITS: 10 TAB at 17:37

## 2022-05-04 RX ADMIN — HEPARIN SODIUM 5000 UNITS: 10000 INJECTION INTRAVENOUS; SUBCUTANEOUS at 05:36

## 2022-05-04 RX ADMIN — LOSARTAN POTASSIUM 25 MG: 25 TABLET, FILM COATED ORAL at 09:50

## 2022-05-04 RX ADMIN — PANTOPRAZOLE SODIUM 40 MG: 40 TABLET, DELAYED RELEASE ORAL at 05:36

## 2022-05-04 RX ADMIN — HEPARIN SODIUM 5000 UNITS: 10000 INJECTION INTRAVENOUS; SUBCUTANEOUS at 21:32

## 2022-05-04 NOTE — PROGRESS NOTES
[place]  Department of Pulmonary, Critical Care and Sleep Medicine  [address] [facility]  Department of Internal Medicine  Progress Note    SUBJECTIVE:    Patient seen and examined this morning. Overall no acute issues overnight. She feels ok  PICC line  Vancomycin stopped    OBJECTIVE:  Vitals:    05/03/22 2258 05/04/22 0523 05/04/22 0737 05/04/22 1126   BP: (!) 127/50  (!) 128/40 (!) 121/46   Pulse: 72  74 74   Resp: 18  18 24   Temp: 97.7 °F (36.5 °C)  97.5 °F (36.4 °C) 97.2 °F (36.2 °C)   TempSrc: Temporal  Infrared Temporal   SpO2: 94%  95% 99%   Weight:  132 lb 15 oz (60.3 kg)     Height:         Constitutional: Alert,  EENT: EOMI YAEL. Mucous membranes dry  Neck: No thyromegaly. Trachea was midline. Respiratory: Scattered rales bilaterally mid to base  Cardiovascular: Regular, No murmur. No rubs. Pulses:  Equal bilaterally. Abdomen: Soft without organomegaly. No rebound, rigidity. No guarding. Lymphatic: No lymphadenopathy. Musculoskeletal: Weakness with minor deficits  Extremities:  + lower extremity edema. Reflexes fair   Skin:  Warm and dry. No skin rashes. Neurological/Psychiatric: No acute psychosis. Cranial nerves are intact. DATA:    Monitor Strips:  Reviewed & discusses with technical team. No changes noted.     RADIOLOGY:  No new chest x-ray today    CT SCAN REVIEWED :      CBC with Differential:    Lab Results   Component Value Date    WBC 17.1 05/04/2022    RBC 3.80 05/04/2022    HGB 10.0 05/04/2022    HCT 32.8 05/04/2022     05/04/2022    MCV 86.3 05/04/2022    MCH 26.3 05/04/2022    MCHC 30.5 05/04/2022    RDW 17.4 05/04/2022    SEGSPCT 62 02/18/2014    LYMPHOPCT 6.2 04/27/2022    MONOPCT 14.1 04/27/2022    EOSPCT 4 10/11/2010    BASOPCT 0.2 04/27/2022    MONOSABS 2.07 04/27/2022    LYMPHSABS 0.91 04/27/2022    EOSABS 0.04 04/27/2022    BASOSABS 0.03 04/27/2022     CMP:    Lab Results   Component Value Date     05/04/2022    K 4.8 05/04/2022     05/04/2022    CO2 34 05/04/2022    BUN 18 05/04/2022    CREATININE 1.1 05/04/2022    GFRAA 56 05/04/2022    LABGLOM 47 05/04/2022    GLUCOSE 107 05/04/2022    GLUCOSE 102 04/06/2012    PROT 6.6 04/27/2022    LABALBU 3.3 04/27/2022    LABALBU 4.7 04/06/2012    CALCIUM 9.0 05/04/2022    BILITOT 0.4 04/27/2022    ALKPHOS 154 04/27/2022    AST 38 04/27/2022    ALT 21 04/27/2022     ABG:    Lab Results   Component Value Date    PH 7.416 05/02/2022    PCO2 77.3 05/02/2022    PO2 81.1 05/02/2022    HCO3 48.6 05/02/2022    BE 20.2 05/02/2022    O2SAT 95.6 05/02/2022       Assessment:   1. Pneumonia  2. Acute hypoxemic respiratory failure  3. Hypercarbic respiratory failure  4. CKD  5. Protein calorie malnutrition  6. Leukocytosis  7. Continue BiPAP at at bedtime and with naps. 8.  Contraction alkalosis        Plan:   1. Sputum's culture with gram-positive cocci on gram stain. However did not speciate  2. CO2 improved on BMP after initiation of Diamox yesterday. 3. ID following  4. Flutter valve ordered. 5. Wean FiO2 as tolerated  6. DuoNebs every 4 hours while awake  7. Continue BiPAP at at bedtime and with naps. 8. Mucolytics  9.  Suspect aspiration   10.      TJB, DO   Pulmonary, Critical Care and Sleep Medicine

## 2022-05-04 NOTE — PROGRESS NOTES
Hospitalist Progress Note    Chief complaint:  Chief Complaint   Patient presents with    Shortness of Breath     Hx of chf sent in by PCP for SOB. patient 85% on RA . Placed on 4 L NC. also C/O bilateral leg swelling    Leg Swelling        Admit date:  4/26/2022    Days in hospital:    8    Synopsis :    80y.o. year old female with a recent TAVR at [facility] October 2021, who was recently seen by her primary care provider with complaint of shortness of breath, and lower extremity edema.  Her primary care provider recommended her go to the ER for further evaluation and diuresis.      Cardiology evaluated the patient     During the course of her stay and RRT was called due to change in mental status, patient's CODE STATUS was changed to DNR CCA.       Pulmonology consulted for BiPAP dependence. A CT of the chest revealed large consolidation suggestive of pneumonia in the right upper lobe posteriorly. Vanco and meropenem was ordered IV. Chest x-ray from 4-29 shows persistent right upper and lower lobe alveolar opacity with new left perihilar asymmetric alveolar  compatible with worsening pneumonia.  Chest x-ray from 4-30 shows no interval change and extensive multifocal pneumonia within the right lung    Assessment and plan:  Principal Problem:    Acute hypoxemic respiratory failure (HCC)  Active Problems:    Chronic congestive heart failure (HCC)    History of transcatheter aortic valve replacement (TAVR)    Moderate protein-calorie malnutrition ([address]  Resolved Problems:    * No resolved hospital problems. *    Assessment:  1. Acute on chronic congestive heart failure, Hx of TAVR in oct of 2021, Echocardiogram,  LVEF is 55-60 %. Mild concentric LVH. Grade II diastolic dysfunction with elevated LA pressure. Mildly dilated right ventricle with preserved function. The left atrium is moderately dilated. Mild pulmonary hypertension with a PASP of 42 mm Hg  2.  Pneumonia, Respiratory culture shows abundant polymorphonuclear leukocytes and moderate Gram positive cocci in pairs  3. Elevated troponin at 4  4. Acute respiratory failure with hypoxia  5. CARLOS creatinine, (resolved) creatinine 0.9 today  6. Hypertension  7. Hyperlipidemia    Plan:  · S/P IV lasix . Started on Diamox   · Cont Toprol XL 25 mg, Norvasc 2.5 mg, aspirin 81 mg, Lipitor 20 mg, Catapres 0.1 mg, Plavix 75 mg, Synthroid 25 mg,  · Daily weights, Strict intake and output  · Completed course with abx   · BiPAP while sleeping   · ID and pulm following     DVT prophylaxis: Subcutaneous heparin  CODE STATUS: Patient is a full code  Discharge plan: Patient can be discharged next 3 to 4 days to home with and without home health.,  Pending clinical improvement, pending work-up and clearance by consulting services. Subjective:   No acute events overnight, this morning patient seen at bedside no acute distress. Patient is not reporting any chest pain. No obvious difficulty in breathing at this point and no worsening of any dyspnea reported. No abdominal pain or discomfort. No dizziness. No new complaint. All questions answered in detail at bedside. Plan discussed with nursing staff in detail.     Objective:    Physical examination:  VS: BP (!) 139/44   Pulse 78   Temp 97.8 °F (36.6 °C) (Temporal)   Resp 28   Ht 4' 11\" (1.499 m)   Wt 130 lb 8 oz (59.2 kg)   SpO2 98%   BMI 26.36 kg/m²   I/O:     Intake/Output Summary (Last 24 hours) at 5/4/2022 0955  Last data filed at [address]  Gross per 24 hour   Intake 240 ml   Output 850 ml   Net -610 ml     General Appearance: alert and oriented to person, place and time, in no acute distress  HEENT: normocephalic and atraumatic, pupils equal, round, and reactive to light, Ssupple and non-tender without mass, no thyromegaly   Cardiovascular: normal rate, regular rhythm, normal S1 and S2, no murmurs, rubs, clicks, or gallops, distal pulses intact, no carotid bruits, no JVD  Pulmonary/Chest: clear to auscultation bilaterally- no wheezes, rales or rhonchi, normal air movement, no respiratory distress  Abdomen: soft, non-tender, non-distended, normal bowel sounds, no masses   Extremities: no cyanosis, clubbing or edema, pulse   Musculoskeletal: normal range of motion, no joint swelling, deformity or tenderness  Neurological: alert, oriented, normal speech, no focal findings or movement disorder noted  Skin: warm and dry, no rash or erythema    Medications:  Scheduled Meds:   bumetanide  1 mg Oral Daily    lidocaine  5 mL IntraDERmal Once    sodium chloride flush  5-40 mL IntraVENous 2 times per day    heparin flush  1 mL IntraVENous 2 times per day    meropenem  1,000 mg IntraVENous Q12H    losartan  25 mg Oral Daily    metoprolol succinate  25 mg Oral Daily    pantoprazole  40 mg Oral QAM AC    potassium bicarb-citric acid  20 mEq Oral Daily    ipratropium-albuterol  1 ampule Inhalation Q4H WA    amLODIPine  10 mg Oral Daily    aspirin  81 mg Oral Daily    atorvastatin  20 mg Oral Daily    [Held by provider] cloNIDine  0.1 mg Oral BID    [Held by provider] clopidogrel  75 mg Oral Daily    polycarbophil  625 mg Oral BID    levothyroxine  25 mcg Oral Daily    rOPINIRole  4 mg Oral Nightly    vitamin D3  400 Units Oral Daily    heparin (porcine)  5,000 Units SubCUTAneous 3 times per day       PRN Meds:  sodium chloride flush, sodium chloride, heparin flush, guaiFENesin-dextromethorphan, benzonatate, perflutren lipid microspheres, hydrALAZINE, ondansetron **OR** ondansetron, polyethylene glycol, acetaminophen **OR** acetaminophen, melatonin    IV:   sodium chloride         LABS:  Recent Results (from the past 24 hour(s))   Basic Metabolic Panel w/ Reflex to MG    Collection Time: 05/04/22  5:10 AM   Result Value Ref Range    Sodium 141 132 - 146 mmol/L    Potassium reflex Magnesium 4.8 3.5 - 5.0 mmol/L    Chloride 101 98 - 107 mmol/L    CO2 34 (H) 22 - 29 mmol/L Anion Gap 6 (L) 7 - 16 mmol/L    Glucose 107 (H) 74 - 99 mg/dL    BUN 18 6 - 23 mg/dL    CREATININE 1.1 (H) 0.5 - 1.0 mg/dL    GFR Non-African American 47 >=60 mL/min/1.73    GFR African American 56     Calcium 9.0 8.6 - 10.2 mg/dL   CBC    Collection Time: 05/04/22  5:10 AM   Result Value Ref Range    WBC 17.1 (H) 4.5 - 11.5 E9/L    RBC 3.80 3.50 - 5.50 E12/L    Hemoglobin 10.0 (L) 11.5 - 15.5 g/dL    Hematocrit 32.8 (L) 34.0 - 48.0 %    MCV 86.3 80.0 - 99.9 fL    MCH 26.3 26.0 - 35.0 pg    MCHC 30.5 (L) 32.0 - 34.5 %    RDW 17.4 (H) 11.5 - 15.0 fL    Platelets 949 621 - 014 E9/L    MPV 11.4 7.0 - 12.0 fL       RADIOLOGY:  Echo Complete    Result Date: 4/28/2022  Transthoracic Echocardiography Report (TTE)  Demographics   Patient Name    Billy Torres  Gender            Female                  F   Medical Record  [id number]     Room Number       6502  Number   Account #       [de-identified]    Procedure Date    04/28/2022   Corporate ID                 Ordering                               Physician   Accession       [id number]   Referring  Number                       Physician   Date of Birth   12/09/1931   Sonographer       Efrem DE LOS SANTOS   Age             80 year(s)   Interpreting      [address]                               Physician         Physician Cardiology                                                 Rohini Ernst MD                                Any Other  Procedure Type of Study   TTE procedure:Echo Complete W/Doppler & Color Flow. Procedure Date Date: 04/28/2022 Start: 10:18 AM Study Location: Portable Technical Quality: Adequate visualization Indications:LV function and S/P TAVR. Patient Status: Routine Height: 59 inches Weight: 145 pounds BSA: 1.61 m^2 BMI: 29.29 kg/m^2 Rhythm: Within normal limits HR: 76 bpm BP: 168/66 mmHg  Findings   Left Ventricle  Normal left ventricular chamber size. Normal left ventricular systolic function. Visually estimated LVEF is 55-60 %. Mild concentric LVH.   No wall motion abnormalities. Grade II diastolic dysfunction with elevated LA pressure. Right Ventricle  Mildly dilated right ventricle with preserved function. Left Atrium  The left atrium is moderately dilated. Right Atrium  Normal right atrial size   Mitral Valve  Physiologic and/or trace mitral regurgitation is present. Mitral annular calcification is present. Mild mitral stenosis. Mean gradient is 5.5 mm Hg. Tricuspid Valve  The tricuspid valve appears structurally normal.  Mild tricuspid regurgitation. Aortic Valve  There is a bioprosthetic aortic valve in place that appears well seated  with acceptable function and gradients. Trace regurgitation present. Pulmonic Valve  Normal pulmonic valve structure and function. Physiologic and/or trace pulmonic regurgitation present. Pericardial Effusion  No evidence for hemodynamically significant pericardial effusion. Pleural Effusion  No evidence of pleural effusion. Aorta  Normal aortic root and ascending aorta. Miscellaneous  Normal Inferior Vena Cava diameter. Inferior Vena Cava, <50% respiratory variation. Conclusions   Summary  Normal left ventricular chamber size. Normal left ventricular systolic function. Visually estimated LVEF is 55-60 %. Mild concentric LVH. No wall motion abnormalities. Grade II diastolic dysfunction with elevated LA pressure. Mildly dilated right ventricle with preserved function. The left atrium is moderately dilated. Normal right atrial size. Mild pulmonary hypertension with a PASP of 42 mm Hg. There is a bioprosthetic aortic valve in place that appears well seated  with acceptable function and gradients. Trace regurgitation present. No comparison study available.    Signature   ----------------------------------------------------------------  Electronically signed by Hayden RAMIREZInterpreting  physician) on 04/28/2022 04:05 PM  ----------------------------------------------------------------  M-Mode/2D Measurements & Calculations   LV Diastolic     LV Systolic Dimension: 2.4 cm   AV Cusp Separation: 1.3  Dimension: 3.7   LV Volume Diastolic: 95.4 ml    cmAO Root Dimension: 2.3  cm               LV Volume Systolic: 61.6 ml     cm  LV FS:35.1 %     LV EDV/LV EDV Index: 58.7 SP/41  LV PW Diastolic: DK/O^2VB ESV/LV ESV Index: 19.3  1.3 cm           ml/12ml/ m^2  LV PW Systolic:  EF Calculated: 67.1 %           RV Diastolic Dimension:  1.5 cm           LV Mass Index: 103 l/min*m^2    3. 1 cm  Septum           LV Length: 7.2 cm  Diastolic: 1.3                                   Ascending Aorta: 1.8 cm  cm               LVOT: 2 cm                      LA volume/Index: 71.4 ml  Septum Systolic:                                 /44.36ml/m^2  1. 4 cm                                           RA Area: 17.3 cm^2  CO: 6.92 l/min  CI: 4.3 l/m*m^2  LV Mass: 166.5 g  Doppler Measurements & Calculations   MV Peak E-Wave:   AV Peak Velocity: 1.99 m/s     LVOT Peak Velocity: 1.3  1.73 m/s          AV Peak Gradient: 15.86 mmHg   m/s  MV Peak A-Wave:   AV Mean Velocity: 1.4 m/s      LVOT Mean Velocity: 0.96  1.5 m/s           AV Mean Gradient: 8.8 mmHg     m/s  MV E/A Ratio:     AV VTI: 40.1 cm                LVOT Peak Gradient: 6.8  1.15              AV Area (Continuity):2.27 cm^2 mmHgLVOT Mean Gradient: 4  MV Peak Gradient:                                mmHg  16.5 mmHg         LVOT VTI: 29 cm                Estimated RAP:8 mmHg  MV Mean Gradient:  5.6 mmHg          Estimated PASP: 42.32 mmHg  MV Mean Velocity: Pulm. Vein A Reversal          TR Velocity:2.93 m/s  1.08 m/s          Duration:133.8 msec            TR Gradient:34.32 mmHg  MV Deceleration   Pulm. Vein D Velocity:0.69  Time: 183 msec    m/sPulm. Vein A Reversal  MV P1/2t: 85.8    Velocity:0.3 m/s  msec              Pulm.  Vein S Velocity: 0.78    TX ED Velocity: 0.43 m/s  MVA by PHT:2.56   m/s  cm^2  MV Area  (continuity): 1.7  cm^2 [email or web address] [id number]    CT CHEST WO CONTRAST    Result Date: 4/28/2022  EXAMINATION: CT OF THE CHEST WITHOUT CONTRAST 4/27/2022 8:40 pm TECHNIQUE: CT of the chest was performed without the administration of intravenous contrast. Multiplanar reformatted images are provided for review. Dose modulation, iterative reconstruction, and/or weight based adjustment of the mA/kV was utilized to reduce the radiation dose to as low as reasonably achievable. COMPARISON: None. HISTORY: ORDERING SYSTEM PROVIDED HISTORY: hypoxia TECHNOLOGIST PROVIDED HISTORY: Reason for exam:->hypoxia What reading provider will be dictating this exam?->CRC FINDINGS: Mediastinum: Mild adenopathy such as pretracheal and subcarinal areas, nonspecific but may relate to pulmonary findings. Cardiomegaly. Aortic stent graft. Atherosclerotic calcifications including coronary arteries. Lower esophagus has focal density, may represent retained capsule on the order of 2 cm length, or other focal ingested hyperdense substance. Lungs/pleura: Moderate right pleural effusion and small on the left. Adjacent atelectasis. There are mild ill-defined focal opacity scattered in the lungs suggesting inflammatory etiology. Most evident; however, is consolidation with air bronchograms and adjacent ill-defined opacification present in the right upper lobe. Overall findings are suggestive of multifocal infiltrates. Neoplasm is less likely but not able to be excluded. Attention on follow-up imaging is recommended as clinically warranted. Upper Abdomen: Limited evaluation. Suggestion of mild ascites. Soft Tissues/Bones: Low bone density suggested and mild spinal degenerative changes. 1. Large consolidation suggestive of pneumonia in the right upper lobe posteriorly.   Multiple scattered bilateral ill-defined opacities are otherwise present suggesting additional infiltrates. Septal thickening asymmetric rightward consistent with interstitial edema. 2. Right larger than left pleural effusions. 3. Cardiomegaly. 4. Focal lower esophageal density suggesting large pill. 5. Mild ascites. XR CHEST PORTABLE    Result Date: 5/2/2022  EXAMINATION: ONE XRAY VIEW OF THE CHEST 5/2/2022 8:28 am COMPARISON: Chest x-ray 04/30/2022 HISTORY: ORDERING SYSTEM PROVIDED HISTORY: follow up pna TECHNOLOGIST PROVIDED HISTORY: Reason for exam:->follow up pna What reading provider will be dictating this exam?->CRC FINDINGS: Opacification in the bilateral lungs right greater than left confluent right upper lobe involvement with increased right lower lung opacifications or effusion. Cardiac size remains enlarged. Increase in opacifications right lower lung with probable effusion component small to moderate along with persistent right upper lobe involvement of airspace disease     XR CHEST PORTABLE    Result Date: 4/30/2022  EXAMINATION: ONE XRAY VIEW OF THE CHEST 4/30/2022 10:53 am COMPARISON: Previous chest x-ray of 04/29/2022 and previous CT scan of the chest of 04/27/2022 HISTORY: ORDERING SYSTEM PROVIDED HISTORY: follow up pna TECHNOLOGIST PROVIDED HISTORY: Reason for exam:->follow up pna What reading provider will be dictating this exam?->CRC FINDINGS: The heart is enlarged. Multifocal bilateral pneumonia is again noted more prominent within the right upper lobe. The pneumonic infiltrate seen throughout the right lung are unchanged. There has been minimal interval clearing of the left perihilar airspace disease. There is a small right pleural effusion. 1. No interval change in extensive multifocal pneumonia within the right lung more prominent within the right upper lobe 2. Very minimal partial interval clearing of the left perihilar pneumonia.  3. Cardiomegaly     XR CHEST PORTABLE    Result Date: 4/29/2022  EXAMINATION: ONE XRAY VIEW OF THE CHEST 4/29/2022 9:07 am COMPARISON: 04/27/2022 HISTORY: ORDERING SYSTEM PROVIDED HISTORY: compare to previous, sob TECHNOLOGIST PROVIDED HISTORY: Reason for exam:->compare to previous, sob What reading provider will be dictating this exam?->CRC FINDINGS: Cardiomegaly is noted. There are findings compatible with a TAVR. There is some improvement in the overall density of the peripheral right upper lobe consolidation however there is new left perihilar alveolar opacity and persistent right lower lobe alveolar opacity with possible effusion. No pneumothorax. Bone density is diminished. Persistent right upper and lower lobe alveolar opacity with new left perihilar asymmetric alveolar compatible with worsening pneumonia. XR CHEST PORTABLE    Result Date: 4/27/2022  EXAMINATION: ONE XRAY VIEW OF THE CHEST 4/27/2022 7:48 am COMPARISON: 04/26/2022 HISTORY: ORDERING SYSTEM PROVIDED HISTORY: AMS,hypoxia TECHNOLOGIST PROVIDED HISTORY: Reason for exam:->AMS,hypoxia What reading provider will be dictating this exam?->CRC FINDINGS: EKG leads overlie the chest.  Valve replacement hardware again identified. Cardiac silhouette is mildly prominent but unchanged. Worsening consolidation at the base of the right upper lobe. Other patchy parenchymal opacities most notable towards the right base appear unchanged. No pneumothorax or other change. Worsening right upper lobe consolidation. Continued follow-up recommended. XR CHEST PORTABLE    Result Date: 4/26/2022  EXAMINATION: ONE XRAY VIEW OF THE CHEST 4/26/2022 1:41 pm COMPARISON: 11 September 2020 HISTORY: ORDERING SYSTEM PROVIDED HISTORY: SOB, CHF TECHNOLOGIST PROVIDED HISTORY: Reason for exam:->SOB, CHF What reading provider will be dictating this exam?->CRC FINDINGS: Multifocal bilateral pneumonia, right greater than left. TAVR present. Heart is enlarged. Normal pulmonary vascularity. Multifocal bilateral pneumonia, right greater than left. Cardiomegaly.      US DUP LOWER EXTREMITIES BILATERAL VENOUS    Result Date: 2022  Patient MRN:  [id number] : 1931 Age: 80 years Gender: Female Order Date:  2022 9:09 PM EXAM: US DUP LOWER EXTREMITIES BILATERAL VENOUS NUMBER OF IMAGES:  40 INDICATION:  r/o dvt r/o dvt What reading provider will be dictating this exam?->[facility] COMPARISON: None Within the visualized vessels, there is no evidence for deep venous thrombosis There is good compressibility, there is good augmentation, there is good color flow. Within the visualized vessels there is no evidence for deep venous thrombosis           Electronically signed by Rohith Ashley MD on 2022 at 9:55 AM  NOTE: This report was transcribed using voice recognition software. Every effort was made to ensure accuracy; however, inadvertent computerized transcription errors may be present.

## 2022-05-04 NOTE — PLAN OF CARE
Problem: Discharge Planning  Goal: Discharge to home or other facility with appropriate resources  Outcome: Progressing     Problem: Pain  Goal: Verbalizes/displays adequate comfort level or baseline comfort level  Outcome: Progressing     Problem: Chronic Conditions and Co-morbidities  Goal: Patient's chronic conditions and co-morbidity symptoms are monitored and maintained or improved  Outcome: Progressing  Flowsheets (Taken 5/4/2022 5651)  Care Plan - Patient's Chronic Conditions and Co-Morbidity Symptoms are Monitored and Maintained or Improved: Monitor and assess patient's chronic conditions and comorbid symptoms for stability, deterioration, or improvement     Problem: Nutrition Deficit:  Goal: Optimize nutritional status  Outcome: Progressing

## 2022-05-04 NOTE — PROGRESS NOTES
Physical Therapy  Physical Therapy Initial Assessment     Name: Kenny Villagomez  : 1931  MRN: [id number]      Date of Service: 2022    Evaluating PT:  Sharon Regalado PT, DPT [id number]    Room #:  8083/2950-O  Diagnosis:  Heart failure ([facility] [I50.9]  Acute respiratory failure with hypoxia ([facility] [J96.01]  Chronic congestive heart failure, unspecified heart failure type ([facility] [I50.9]  PMHx/PSHx:  Arthritis, cataract, L femur fx, heart murmur, TAVR, HLD, HTN  Procedure/Surgery:  None   Precautions:  Falls, O2  Equipment Needs:  None, patient has [address]    SUBJECTIVE:    Pt lives with daughter in a 2 story home with 2 stairs to enter and 1 rail. Bed is on 2nd floor and bath is on 2nd floor with full flight and 1 rail -- pt reports that she spends the majority of her time on the 2nd level with her daughter assisting her. Pt ambulated with [address] recently PTA. OBJECTIVE:   Initial Evaluation  Date: 22 Treatment Short Term/ Long Term   Goals   AM-PAC 6 Clicks      Was pt agreeable to Eval/treatment? yes     Does pt have pain? 7/10 LUE IV site     Bed Mobility  Rolling: Mohit  Supine to sit: ModA  Sit to supine: NT  Scooting: ModA  Rolling: Independent   Supine to sit: Independent   Sit to supine: Independent   Scooting: Independent    Transfers Sit to stand: Mohit  Stand to sit: Mohit  Stand pivot: ModA with [address]  Sit to stand: Independent   Stand to sit: Independent   Stand pivot: Mod I with [address]   Ambulation    few steps to chair with [address] ModA  50 feet with [address] Mod I   Stair negotiation: ascended and descended  NT  13 steps with 1 rail Mohit   ROM BUE:  Defer to OT note  BLE:  WFL     Strength BUE:  Defer to OT note  BLE:  Grossly 3+/5  WFL   Balance Sitting EOB:  SBA  Dynamic Standing:  ModA with [address]  Sitting EOB:  Independent   Dynamic Standing:   Mod I with [address]     Pt is A & O x 4  Sensation:  L 1st-3rd fingers tingling  Edema:  LUE swelling    Vitals:  HR WNL throughout  SPO2 on 4L: 90-94%    Patient education  Pt educated on role of PT, safety during mobility, PLB    Patient response to education:   Pt verbalized understanding Pt demonstrated skill Pt requires further education in this area   yes yes yes     ASSESSMENT:    Conditions Requiring Skilled Therapeutic Intervention:    [x]Decreased strength     [x]Decreased ROM  [x]Decreased functional mobility  [x]Decreased balance   [x]Decreased endurance   []Decreased posture  []Decreased sensation  []Decreased coordination   []Decreased vision  []Decreased safety awareness   [x]Increased pain       Comments:  RN cleared patient for mobility with decreased use of LUE d/t difficult IV placement. Patient semi-supine in bed upon entry and agreeable to PT evaluation. Patient able to complete bed mobility with assist to trunk and BLEs. No dizziness at EOB. Patient able to stand and ambulate few steps to chair -- near total assist to advance [employer] during standing mobility. Light balance assist needed. Patient left sitting in chair in room at end of session with all needs met. Education provided on use of call light for assist with mobility with verbalized understanding. Patient to benefit from continued skilled PT at DC to improve functional mobility and decrease fall risk. Treatment:  Patient practiced and was instructed in the following treatment:     Bed Mobility: VCs provided for sequencing and safety during mobility. Manual assist provided for completion of task.  Transfer Training: Verbal and tactile cueing provided for sequencing and safety during mobility. Manual assist provided for completion of task   Gait Training: Ambulation with [employer] and verbal cues for proper technique and safety. Manual assist provided for completion of task     Pt's/ family goals   1. Improve mobility    Prognosis is good for reaching above PT goals. Patient and or family understand(s) diagnosis, prognosis, and plan of care.   yes    PHYSICAL THERAPY PLAN OF CARE:    PT POC is established based on physician order and patient diagnosis     Referring provider/PT Order:    05/03/22 1945  PT eval and treat  Start:  05/03/22 1945,   End:  05/03/22 1945,   ONE TIME,   Standing Count:  1 Occurrences,   Nicho Ferrera MD        Diagnosis:  [facility] [I50.9]  Acute respiratory failure with hypoxia (HCC) [J96.01]  Chronic congestive heart failure, unspecified heart failure type ([facility] [I50.9]  Specific instructions for next treatment:  Progress mobility as appropriate     Current Treatment Recommendations:     [x] Strengthening to improve independence with functional mobility   [x] ROM to improve independence with functional mobility   [x] Balance Training to improve static/dynamic balance and to reduce fall risk  [x] Endurance Training to improve activity tolerance during functional mobility   [x] Transfer Training to improve safety and independence with all functional transfers   [x] Gait Training to improve gait mechanics, endurance and assess need for appropriate assistive device  [x] Stair Training in preparation for safe discharge home and/or into the community   [x] Positioning to prevent skin breakdown and contractures  [x] Safety and Education Training   [x] Patient/Caregiver Education   [x] HEP  [x] Other     PT long term treatment goals are located in above grid    Frequency of treatments: 2-5x/week x 1-2 weeks. Time in  1144  Time out  1210    Total Treatment Time  10 minutes     Evaluation Time includes thorough review of current medical information, gathering information on past medical history/social history and prior level of function, completion of standardized testing/informal observation of tasks, assessment of data and education on plan of care and goals.     CPT codes:  [x] Low Complexity PT evaluation 44101  [] Moderate Complexity PT evaluation 96058  [] High Complexity PT evaluation 51473  [] PT Re-evaluation 86548  [] Gait training 04210 - minutes  [] Manual therapy 36197 - minutes  [x] Therapeutic activities 67183 10 minutes  [] Therapeutic exercises 53061 - minutes  [] Neuromuscular reeducation 00610 - minutes     Pedro Marin PT, DPT  [id number]

## 2022-05-04 NOTE — PATIENT CARE CONFERENCE
P Quality Flow/Interdisciplinary Rounds Progress Note        Quality Flow Rounds held on May 4, 2022    Disciplines Attending:  Bedside Nurse, ,  and Nursing Unit Leadership    Jamila Howard was admitted on 4/26/2022  2:32 PM    Anticipated Discharge Date:  Expected Discharge Date: 05/03/22    Disposition:    Avi Score:  Avi Scale Score: 17    Readmission Risk              Risk of Unplanned Readmission:  16           Discussed patient goal for the day, patient clinical progression, and barriers to discharge.   The following Goal(s) of the Day/Commitment(s) have been identified:  Diagnostics - Report Results and Labs - Report Results      Ti Mendoza RN  May 4, 2022

## 2022-05-04 NOTE — CARE COORDINATION
Spoke with [employer], pt has lost criteria. [place] following, and will need [facility] orders for cpa, med compliance, and PT/OT, if plan remains home. Family transport. Awaiting therapy evals for recommendations, called #[phone] for STAT evals for d/c recommendations. Met with pt to discuss discharge planning, pt would like to go to [address] [facility], called [facility] they have no beds, pt would like a list given to daughter to choose a facility in [facility]. Called [place] daughter, reviewed list over phone, she would like to try [facility] or [facility], then [address] Referral to [facility] via message, await assessment. Referral to [facility]. Exemption form started will need completed at discharge. Envelope started, awaiting therapy recommendations for transport. 3:25pm spoke with [facility], they accepted pt at [address] Notified daughter of discharge plan. Karen Timmons, MSW, LSW

## 2022-05-04 NOTE — PROGRESS NOTES
Abdomen: Soft nontender  Extremities: No clubbing, no cyanosis, no edema. Musculoskeletal: no Gross abnormalities  Neurological :alert and oriented x3  Lines: left UE midline     Labs,Cultures reviewed  Radiology reviewed    Microbiology:  MRSA nasal screen: negative  Resp cx: oral yefri      Assessment:  · Acute hypoxic respiratory failure/multifocal pneumonia: She is clinically improving. oxygenation has significantly improved since admission. She is on day 6 of IV meropenem. Completed 6 days of IV vancomycin.     Plan:    · Completes 7 days of IV antibiotics today.   · Will follow with you      Electronically signed by Ilda Sauceda MD on 5/4/2022 at 3:09 PM

## 2022-05-05 ENCOUNTER — APPOINTMENT (OUTPATIENT)
Dept: GENERAL RADIOLOGY | Age: 87
DRG: 193 | End: 2022-05-05
Payer: MEDICARE

## 2022-05-05 VITALS
RESPIRATION RATE: 20 BRPM | TEMPERATURE: 97.4 F | DIASTOLIC BLOOD PRESSURE: 54 MMHG | HEART RATE: 124 BPM | HEIGHT: 59 IN | SYSTOLIC BLOOD PRESSURE: 107 MMHG | BODY MASS INDEX: 28 KG/M2 | WEIGHT: 138.89 LBS | OXYGEN SATURATION: 97 %

## 2022-05-05 LAB
ANION GAP SERPL CALCULATED.3IONS-SCNC: 6 MMOL/L (ref 7–16)
BUN BLDV-MCNC: 25 MG/DL (ref 6–23)
CALCIUM SERPL-MCNC: 8.9 MG/DL (ref 8.6–10.2)
CHLORIDE BLD-SCNC: 98 MMOL/L (ref 98–107)
CO2: 36 MMOL/L (ref 22–29)
CREAT SERPL-MCNC: 1 MG/DL (ref 0.5–1)
GFR AFRICAN AMERICAN: >60
GFR NON-AFRICAN AMERICAN: 52 ML/MIN/1.73
GLUCOSE BLD-MCNC: 102 MG/DL (ref 74–99)
HCT VFR BLD CALC: 31.3 % (ref 34–48)
HEMOGLOBIN: 9.5 G/DL (ref 11.5–15.5)
MCH RBC QN AUTO: 26 PG (ref 26–35)
MCHC RBC AUTO-ENTMCNC: 30.4 % (ref 32–34.5)
MCV RBC AUTO: 85.5 FL (ref 80–99.9)
PDW BLD-RTO: 17.6 FL (ref 11.5–15)
PLATELET # BLD: 241 E9/L (ref 130–450)
PMV BLD AUTO: 11.5 FL (ref 7–12)
POTASSIUM REFLEX MAGNESIUM: 5 MMOL/L (ref 3.5–5)
PRO-BNP: 2371 PG/ML (ref 0–450)
RBC # BLD: 3.66 E12/L (ref 3.5–5.5)
SARS-COV-2, NAAT: NOT DETECTED
SODIUM BLD-SCNC: 140 MMOL/L (ref 132–146)
WBC # BLD: 13.1 E9/L (ref 4.5–11.5)

## 2022-05-05 PROCEDURE — 71045 X-RAY EXAM CHEST 1 VIEW: CPT

## 2022-05-05 PROCEDURE — 87635 SARS-COV-2 COVID-19 AMP PRB: CPT

## 2022-05-05 PROCEDURE — 6370000000 HC RX 637 (ALT 250 FOR IP)

## 2022-05-05 PROCEDURE — 2580000003 HC RX 258: Performed by: EMERGENCY MEDICINE

## 2022-05-05 PROCEDURE — 36415 COLL VENOUS BLD VENIPUNCTURE: CPT

## 2022-05-05 PROCEDURE — 94660 CPAP INITIATION&MGMT: CPT

## 2022-05-05 PROCEDURE — 6360000002 HC RX W HCPCS: Performed by: EMERGENCY MEDICINE

## 2022-05-05 PROCEDURE — 99233 SBSQ HOSP IP/OBS HIGH 50: CPT | Performed by: INTERNAL MEDICINE

## 2022-05-05 PROCEDURE — 85027 COMPLETE CBC AUTOMATED: CPT

## 2022-05-05 PROCEDURE — 2700000000 HC OXYGEN THERAPY PER DAY

## 2022-05-05 PROCEDURE — 6370000000 HC RX 637 (ALT 250 FOR IP): Performed by: INTERNAL MEDICINE

## 2022-05-05 PROCEDURE — 94640 AIRWAY INHALATION TREATMENT: CPT

## 2022-05-05 PROCEDURE — 6360000002 HC RX W HCPCS

## 2022-05-05 PROCEDURE — 83880 ASSAY OF NATRIURETIC PEPTIDE: CPT

## 2022-05-05 PROCEDURE — 6370000000 HC RX 637 (ALT 250 FOR IP): Performed by: FAMILY MEDICINE

## 2022-05-05 PROCEDURE — 80048 BASIC METABOLIC PNL TOTAL CA: CPT

## 2022-05-05 RX ORDER — AMLODIPINE BESYLATE 10 MG/1
10 TABLET ORAL DAILY
Status: ON HOLD | COMMUNITY
Start: 2022-03-04 | End: 2022-05-05 | Stop reason: HOSPADM

## 2022-05-05 RX ORDER — AMLODIPINE BESYLATE 10 MG/1
5 TABLET ORAL DAILY
Qty: 30 TABLET | Refills: 3 | Status: SHIPPED | OUTPATIENT
Start: 2022-05-06

## 2022-05-05 RX ORDER — BUMETANIDE 1 MG/1
1 TABLET ORAL DAILY
Qty: 30 TABLET | Refills: 3 | Status: SHIPPED | OUTPATIENT
Start: 2022-05-06

## 2022-05-05 RX ORDER — OMEPRAZOLE 40 MG/1
40 CAPSULE, DELAYED RELEASE ORAL DAILY
COMMUNITY
Start: 2022-04-26

## 2022-05-05 RX ORDER — BUMETANIDE 2 MG/1
2 TABLET ORAL DAILY
Status: ON HOLD | COMMUNITY
End: 2022-05-05 | Stop reason: HOSPADM

## 2022-05-05 RX ORDER — POTASSIUM CHLORIDE 750 MG/1
20 TABLET, FILM COATED, EXTENDED RELEASE ORAL DAILY
COMMUNITY
Start: 2022-04-26

## 2022-05-05 RX ORDER — ROPINIROLE 4 MG/1
4 TABLET, FILM COATED ORAL 2 TIMES DAILY
COMMUNITY
Start: 2022-04-26

## 2022-05-05 RX ORDER — AMLODIPINE BESYLATE 10 MG/1
10 TABLET ORAL DAILY
Qty: 30 TABLET | Refills: 3 | Status: SHIPPED | OUTPATIENT
Start: 2022-05-06 | End: 2022-05-05

## 2022-05-05 RX ADMIN — CALCIUM POLYCARBOPHIL 625 MG: 625 TABLET, FILM COATED ORAL at 09:56

## 2022-05-05 RX ADMIN — PANTOPRAZOLE SODIUM 40 MG: 40 TABLET, DELAYED RELEASE ORAL at 05:52

## 2022-05-05 RX ADMIN — BUMETANIDE 1 MG: 1 TABLET ORAL at 09:56

## 2022-05-05 RX ADMIN — HEPARIN 100 UNITS: 100 SYRINGE at 09:56

## 2022-05-05 RX ADMIN — LEVOTHYROXINE SODIUM 25 MCG: 0.03 TABLET ORAL at 05:52

## 2022-05-05 RX ADMIN — ASPIRIN 81 MG: 81 TABLET, COATED ORAL at 09:56

## 2022-05-05 RX ADMIN — SODIUM CHLORIDE, PRESERVATIVE FREE 10 ML: 5 INJECTION INTRAVENOUS at 09:56

## 2022-05-05 RX ADMIN — ATORVASTATIN CALCIUM 20 MG: 20 TABLET, FILM COATED ORAL at 09:56

## 2022-05-05 RX ADMIN — LOSARTAN POTASSIUM 25 MG: 25 TABLET, FILM COATED ORAL at 09:56

## 2022-05-05 RX ADMIN — AMLODIPINE BESYLATE 10 MG: 10 TABLET ORAL at 09:56

## 2022-05-05 RX ADMIN — IPRATROPIUM BROMIDE AND ALBUTEROL SULFATE 1 AMPULE: .5; 2.5 SOLUTION RESPIRATORY (INHALATION) at 15:24

## 2022-05-05 RX ADMIN — METOPROLOL SUCCINATE 25 MG: 25 TABLET, EXTENDED RELEASE ORAL at 09:56

## 2022-05-05 RX ADMIN — POTASSIUM BICARBONATE 20 MEQ: 782 TABLET, EFFERVESCENT ORAL at 09:55

## 2022-05-05 RX ADMIN — HEPARIN SODIUM 5000 UNITS: 10000 INJECTION INTRAVENOUS; SUBCUTANEOUS at 14:40

## 2022-05-05 RX ADMIN — IPRATROPIUM BROMIDE AND ALBUTEROL SULFATE 1 AMPULE: .5; 2.5 SOLUTION RESPIRATORY (INHALATION) at 08:22

## 2022-05-05 RX ADMIN — HEPARIN SODIUM 5000 UNITS: 10000 INJECTION INTRAVENOUS; SUBCUTANEOUS at 05:52

## 2022-05-05 ASSESSMENT — PAIN SCALES - GENERAL: PAINLEVEL_OUTOF10: 0

## 2022-05-05 NOTE — PROGRESS NOTES
Infectious Disease  Progress Note  [facility]    Chief Complaint: pneumonia    Subjective:  No new complaints. Stable on 4L oxygen. Daughter at bed side. No fever. Pt sitting in chair today. Scheduled Meds:   bumetanide  1 mg Oral Daily    lidocaine  5 mL IntraDERmal Once    sodium chloride flush  5-40 mL IntraVENous 2 times per day    heparin flush  1 mL IntraVENous 2 times per day    losartan  25 mg Oral Daily    metoprolol succinate  25 mg Oral Daily    pantoprazole  40 mg Oral QAM AC    potassium bicarb-citric acid  20 mEq Oral Daily    ipratropium-albuterol  1 ampule Inhalation Q4H WA    amLODIPine  10 mg Oral Daily    aspirin  81 mg Oral Daily    atorvastatin  20 mg Oral Daily    [Held by provider] cloNIDine  0.1 mg Oral BID    [Held by provider] clopidogrel  75 mg Oral Daily    polycarbophil  625 mg Oral BID    levothyroxine  25 mcg Oral Daily    rOPINIRole  4 mg Oral Nightly    vitamin D3  400 Units Oral Daily    heparin (porcine)  5,000 Units SubCUTAneous 3 times per day     Continuous Infusions:   sodium chloride       PRN Meds:sodium chloride flush, sodium chloride, heparin flush, guaiFENesin-dextromethorphan, benzonatate, perflutren lipid microspheres, hydrALAZINE, ondansetron **OR** ondansetron, polyethylene glycol, acetaminophen **OR** acetaminophen, melatonin    ROS:  As mentioned in subjective, all other systems negative      BP (!) 122/42   Pulse 79   Temp 98.4 °F (36.9 °C) (Oral)   Resp 18   Ht 4' 11\" (1.499 m)   Wt 138 lb 14.2 oz (63 kg)   SpO2 95%   BMI 28.05 kg/m²     Physical Exam:  Constitutional: The patient is awake, alert, and oriented. Skin: Warm and dry. No jaundice. HEENT: Eyes show round, and reactive pupils. Moist mucous membranes, no ulcerations, no thrush. On 4 L of oxygen  Neck: Supple to movements. Chest: There to auscultation anteriorly  Cardiovascular: S1 and S2 are rhythmic and regular. No murmurs appreciated.    Abdomen: Soft nontender, rodriguez catheter with blood in urine  Extremities: No clubbing, no cyanosis, no edema. Musculoskeletal: no Gross abnormalities  Neurological :alert and oriented x3  Lines: left UE midline     Labs,Cultures reviewed  Radiology reviewed  CXR: looks better     Microbiology:  MRSA nasal screen: negative  Resp cx: oral yefri      Assessment:  · Acute hypoxic respiratory failure/multifocal pneumonia: She is clinically improving. oxygenation has significantly improved since admission. She is on day 6 of IV meropenem. Completed 6 days of IV vancomycin. · Leucocytosis: improving ? From hematuria ? Trauma.     Plan:    · Doing well off antibiotics. · Consider removing rodriguez catheter and make sure she urinates before discharge. · ID signs off. Please call me if needed. Discussed with nursing staff.       Electronically signed by Pia Hirsch MD on 5/5/2022 at 1:27 PM

## 2022-05-05 NOTE — DISCHARGE INSTR - COC
Continuity of Care Form    Patient Name: Josefina Cabrales   :  1931  MRN:  [id number]    Admit date:  2022  Discharge date:  22    Code Status Order: Limited   Advance Directives:      Admitting Physician:  Patrica Stoll DO  PCP: Sharon Marino DO    Discharging Nurse: [facility] Unit/Room#: 9106/2671-T  Discharging Unit Phone Number: [phone]    Emergency Contact:   Extended Emergency Contact Information  Primary Emergency Contact: Mely Wiggins  Address: [facility] [address], [address] [place] [address] Phone: [phone]  Mobile Phone: [phone]  Relation: Child   needed? No  Secondary Emergency Contact: Kathleen Khanna, [address] Phone: [phone]  Mobile Phone: [phone]  Relation: Other    Past Surgical History:  Past Surgical History:   Procedure Laterality Date    APPENDECTOMY      HIP SURGERY      pins in L hip    HIP SURGERY Left 8/20/15    im nail pinning and hardware removal    HYSTERECTOMY      JOINT REPLACEMENT  5/10/2012    total r hip    PELVIC FRACTURE SURGERY  2008    REVISION TOTAL HIP ARTHROPLASTY      R hip 1999    SKIN CANCER EXCISION      facial and        Immunization History:   Immunization History   Administered Date(s) Administered    COVID-19, Pfizer Purple top, DILUTE for use, 12+ yrs, 30mcg/0.3mL dose 2021, 2021, 10/17/2021       Active Problems:  Patient Active Problem List   Diagnosis Code    Hip pain M25.559    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 92XA    Closed subtrochanteric fracture of left femur with delayed healing S72. 22XG    Near syncope R55    Chronic congestive heart failure (HCC) I50.9    Acute hypoxemic respiratory failure (HCC) J96.01    History of transcatheter aortic valve replacement (TAVR) Z95.2    Moderate protein-calorie malnutrition (HCC) E44.0       Isolation/Infection:   Isolation            No Isolation          Patient Infection Status       Infection Onset Added Last Indicated Last Indicated By Review Planned Expiration Resolved Resolved By    None active    Resolved    COVID-19 (Rule Out) 04/26/22 04/26/22 04/26/22 COVID-19 & Influenza Combo (Ordered)   04/26/22 Rule-Out Test Resulted            Nurse Assessment:  Last Vital Signs: BP (!) 122/42   Pulse 79   Temp 98.4 °F (36.9 °C) (Oral)   Resp 16   Ht 4' 11\" (1.499 m)   Wt 138 lb 14.2 oz (63 kg)   SpO2 97%   BMI 28.05 kg/m²     Last documented pain score (0-10 scale): Pain Level: 0  Last Weight:   Wt Readings from Last 1 Encounters:   05/05/22 138 lb 14.2 oz (63 kg)     Mental Status:  oriented and alert    IV Access:  - None    Nursing Mobility/ADLs:  Walking   Assisted  Transfer  Assisted  Bathing  Assisted  Dressing  Assisted  Toileting  Assisted  Feeding  Assisted  Med Admin  Assisted  Med Delivery   whole    Wound Care Documentation and Therapy:        Elimination:  Continence: Bowel: No  Bladder: No  Urinary Catheter: Removal Date 5/5/22    Colostomy/Ileostomy/Ileal Conduit: No       Date of Last BM: 5/4/22    Intake/Output Summary (Last 24 hours) at 5/5/2022 1005  Last data filed at 5/5/2022 0610  Gross per 24 hour   Intake 621.8 ml   Output 1600 ml   Net -978.2 ml     I/O last 3 completed shifts: In: 621.8 [P.O.:240; IV Piggyback:381.8]  Out: 2450 [Urine:2450]    Safety Concerns:      At Risk for Falls    Impairments/Disabilities:      None    Nutrition Therapy:  Current Nutrition Therapy:   - Oral Diet:  General and Low Sodium (2gm)    Routes of Feeding: Oral  Liquids: No Restrictions  Daily Fluid Restriction: no  Last Modified Barium Swallow with Video (Video Swallowing Test): not done    Treatments at the Time of Hospital Discharge:   Respiratory Treatments: Duoneb  Oxygen Therapy:  is on oxygen at 4 L/min per nasal cannula. Ventilator:    - BiPAP   IPAP: 14 cmH20, CPAP/EPAP: 8 cmH2O only when sleeping    Rehab Therapies: Physical Therapy and Occupational Therapy  Weight Bearing Status/Restrictions: No weight bearing restrictions  Other Medical Equipment (for information only, NOT a DME order):  walker  Other Treatments: ***    Patient's personal belongings (please select all that are sent with patient):  None    RN SIGNATURE:  Electronically signed by Kassandra Paredes RN on 5/5/22 at 2:05 PM EDT    CASE MANAGEMENT/SOCIAL WORK SECTION    Inpatient Status Date: ***    Readmission Risk Assessment Score:  Readmission Risk              Risk of Unplanned Readmission:  16           Discharging to Facility/ Agency   Name: [facility] Blackman  Address:  [address] [place] [address]  Fax:    Dialysis Facility (if applicable)   Name:  Address:  Dialysis Schedule:  Phone:  Fax:    / signature: Electronically signed by CIRO Castillo, LIZBETHW on 5/5/22 at 10:05 AM EDT    PHYSICIAN SECTION    Prognosis: {Prognosis:8105881432}    Condition at Discharge: [address] [place] [address] Patient Condition:[id number]}    Rehab Potential (if transferring to Rehab): {Prognosis:9190940816}    Recommended Labs or Other Treatments After Discharge: ***    Physician Certification: I certify the above information and transfer of Julissa Cruz  is necessary for the continuing treatment of the diagnosis listed and that she requires {Admit to Appropriate Level of Care:38429} for {GREATER/LESS:413229481} 30 days.      Update Admission H&P: {CHP DME Changes in [facility]:862821225}    PHYSICIAN SIGNATURE:  {Esignature:831264914}

## 2022-05-05 NOTE — DISCHARGE SUMMARY
Hospitalist Discharge Summary    Patient ID:  Jamila Howard  [id number]  85 y.o.  12/9/1931    Admit date: 4/26/2022    Discharge date: 5/5/2022    Disposition: Back to home    Admission Diagnoses:   Patient Active Problem List   Diagnosis    Hip pain    Aseptic loosening of prosthetic hip ([address]    DJD (degenerative joint disease) of knee    Knee pain    HTN (hypertension)    HLD (hyperlipidemia)    Closed fracture of left femur ([address]    Closed subtrochanteric fracture of left femur with delayed healing    Near syncope    Chronic congestive heart failure (HCC)    Acute hypoxemic respiratory failure (HCC)    History of transcatheter aortic valve replacement (TAVR)    Moderate protein-calorie malnutrition ([address]       Discharge Diagnoses: Principal Problem:    Acute hypoxemic respiratory failure (HCC)  Active Problems:    Chronic congestive heart failure (HCC)    History of transcatheter aortic valve replacement (TAVR)    Moderate protein-calorie malnutrition ([address]  Resolved Problems:    * No resolved hospital problems. *      Code Status:  Limited    Condition:  Stable     Discharge Diet: Diet:  ADULT DIET; Regular; Low Sodium (2 gm)  ADULT ORAL NUTRITION SUPPLEMENT; Lunch; Standard High Calorie/High Protein Oral Supplement  ADULT ORAL NUTRITION SUPPLEMENT; Dinner; Frozen Oral Supplement      Hospital Course:     80y.o. year old female with a recent TAVR at [facility] October 2021, who was recently seen by her primary care provider with complaint of shortness of breath, and lower extremity edema.  Her primary care provider recommended her go to the ER for further evaluation and diuresis.       Cardiology evaluated the patient      During the course of her stay and RRT was called due to change in mental status, patient's CODE STATUS was changed to DNR CCA.        Pulmonology consulted for BiPAP dependence. A CT of the chest revealed large consolidation suggestive of pneumonia in the right upper lobe posteriorly. Vanco and meropenem was ordered IV. Chest x-ray from 4-29 shows persistent right upper and lower lobe alveolar opacity with new left perihilar asymmetric alveolar  compatible with worsening pneumonia.  Chest x-ray from 4-30 shows no interval change and extensive multifocal pneumonia within the right lung        Assessment:  1. Acute on chronic congestive heart failure, Hx of TAVR in oct of 2021, Echocardiogram,  LVEF is 55-60 %. Mild concentric LVH. Grade II diastolic dysfunction with elevated LA pressure. Mildly dilated right ventricle with preserved function. The left atrium is moderately dilated. Mild pulmonary hypertension with a PASP of 42 mm Hg  2. Bacterial pneumonia, Respiratory culture shows abundant polymorphonuclear leukocytes and moderate Gram positive cocci in pairs, patient finished treatment with broad-spectrum antibiotic  3. Elevated troponin, likely type II ischemia in the setting of above  4. Acute respiratory failure with hypoxia  5. CARLOS creatinine, (resolved) creatinine 0.9 today  6. Hypertension, blood pressure medication adjusted this admission  7. Hyperlipidemia     Plan:  · S/P IV lasix .  Started on Diamox   · Cont Toprol XL 25 mg, Norvasc 2.5 mg, aspirin 81 mg, Lipitor 20 mg, Catapres 0.1 mg, Plavix 75 mg, Synthroid 25 mg,  · Daily weights, Strict intake and output  · Completed course with abx   · BiPAP while sleeping   · ID and pulm evaluated the patient  · Patient medically stable to be discharged to be seen    Discharge Medications:   Current Discharge Medication List        Current Discharge Medication List      CONTINUE these medications which have CHANGED    Details   amLODIPine (NORVASC) 10 MG tablet Take 1 tablet by mouth daily  Qty: 30 tablet, Refills: 3      bumetanide (BUMEX) 1 MG tablet Take 1 tablet by mouth daily  Qty: 30 tablet, Refills: 3           Current Discharge Medication List      CONTINUE these medications which have NOT CHANGED    Details metoprolol succinate (TOPROL XL) 25 MG extended release tablet Take 25 mg by mouth daily       losartan (COZAAR) 25 MG tablet Take 25 mg by mouth daily      rOPINIRole (REQUIP) 4 MG tablet Take 4 mg by mouth 2 times daily      potassium chloride (KLOR-CON) 10 MEQ extended release tablet Take 10 mEq by mouth daily      omeprazole (PRILOSEC) 40 MG delayed release capsule Take 40 mg by mouth daily      atorvastatin (LIPITOR) 20 MG tablet Take 20 mg by mouth daily      FIBER SELECT GUMMIES PO Take by mouth 2 times daily      clopidogrel (PLAVIX) 75 MG tablet Take 1 tablet by mouth daily When cleared with orthopedics  Qty: 30 tablet, Refills: 0      levothyroxine (SYNTHROID) 25 MCG tablet Take 25 mcg by mouth Daily      aspirin 81 MG EC tablet Take 1 tablet by mouth daily. Qty: 30 tablet, Refills: 3      VITAMIN D, CHOLECALCIFEROL, PO Take 400 mg by mouth daily.  5 pm           Current Discharge Medication List      STOP taking these medications       potassium chloride (KLOR-CON) 20 MEQ packet Comments:   Reason for Stopping:         cloNIDine (CATAPRES) 0.2 MG tablet Comments:   Reason for Stopping:         furosemide (LASIX) 20 MG tablet Comments:   Reason for Stopping:                     Assessment on Discharge: Stable, improved       Discharge Exam:  BP (!) 107/54   Pulse 124   Temp 97.4 °F (36.3 °C) (Core)   Resp 20   Ht 4' 11\" (1.499 m)   Wt 138 lb 14.2 oz (63 kg)   SpO2 95%   BMI 28.05 kg/m²   Patient seen at bedside, no acute distress, no obvious difficulty breathing and no respiratory distress  Patient awake alert responding to my questions and commands  Neurological examination: Awake and alert into 3, no focal neurological deficit noticed  Extremity examination: No peripheral edema, no erythema or rashes  Abdominal examination: Not distended, no pain reported        Pertinent Studies During Hospital Stay:  Radiology:  Echo Complete    Result Date: 4/28/2022  Transthoracic Echocardiography Report (TTE)  Demographics   Patient Name    Lyric Isaacs  Gender            Female                  F   Medical Record  [id number]     Room Number       [address] [facility]  Number   Account #       [de-identified]    Procedure Date    04/28/2022   Corporate ID                 Ordering                               Physician   Accession       [id number]   Referring  Number                       Physician   Date of Birth   12/09/1931   Sonographer       Fadumo DE LOS SANTOS   Age             80 year(s)   Interpreting      [id number]                               Physician         Physician Cardiology                                                 Lucia Jacinto MD                                Any Other  Procedure Type of Study   TTE procedure:Echo Complete W/Doppler & Color Flow. Procedure Date Date: 04/28/2022 Start: 10:18 AM Study Location: Portable Technical Quality: Adequate visualization Indications:LV function and S/P TAVR. Patient Status: Routine Height: 59 inches Weight: 145 pounds BSA: 1.61 m^2 BMI: 29.29 kg/m^2 Rhythm: Within normal limits HR: 76 bpm BP: 168/66 mmHg  Findings   Left Ventricle  Normal left ventricular chamber size. Normal left ventricular systolic function. Visually estimated LVEF is 55-60 %. Mild concentric LVH. No wall motion abnormalities. Grade II diastolic dysfunction with elevated LA pressure. Right Ventricle  Mildly dilated right ventricle with preserved function. Left Atrium  The left atrium is moderately dilated. Right Atrium  Normal right atrial size   Mitral Valve  Physiologic and/or trace mitral regurgitation is present. Mitral annular calcification is present. Mild mitral stenosis. Mean gradient is 5.5 mm Hg. Tricuspid Valve  The tricuspid valve appears structurally normal.  Mild tricuspid regurgitation. Aortic Valve  There is a bioprosthetic aortic valve in place that appears well seated  with acceptable function and gradients. Trace regurgitation present.    Pulmonic Valve  Normal pulmonic valve structure and function. Physiologic and/or trace pulmonic regurgitation present. Pericardial Effusion  No evidence for hemodynamically significant pericardial effusion. Pleural Effusion  No evidence of pleural effusion. Aorta  Normal aortic root and ascending aorta. Miscellaneous  Normal Inferior Vena Cava diameter. Inferior Vena Cava, <50% respiratory variation. Conclusions   Summary  Normal left ventricular chamber size. Normal left ventricular systolic function. Visually estimated LVEF is 55-60 %. Mild concentric LVH. No wall motion abnormalities. Grade II diastolic dysfunction with elevated LA pressure. Mildly dilated right ventricle with preserved function. The left atrium is moderately dilated. Normal right atrial size. Mild pulmonary hypertension with a PASP of 42 mm Hg. There is a bioprosthetic aortic valve in place that appears well seated  with acceptable function and gradients. Trace regurgitation present. No comparison study available. Signature   ----------------------------------------------------------------  Electronically signed by Karin Brewer MD(Interpreting  physician) on 04/28/2022 04:05 PM  ----------------------------------------------------------------  M-Mode/2D Measurements & Calculations   LV Diastolic     LV Systolic Dimension: 2.4 cm   AV Cusp Separation: 1.3  Dimension: 3.7   LV Volume Diastolic: 39.1 ml    cmAO Root Dimension: 2.3  cm               LV Volume Systolic: 83.2 ml     cm  LV FS:35.1 %     LV EDV/LV EDV Index: 58.7 KL/65  LV PW Diastolic: WC/S^1DM ESV/LV ESV Index: 19.3  1.3 cm           ml/12ml/ m^2  LV PW Systolic:  EF Calculated: 67.1 %           RV Diastolic Dimension:  1.5 cm           LV Mass Index: 103 l/min*m^2    3. 1 cm  Septum           LV Length: 7.2 cm  Diastolic: 1.3                                   Ascending Aorta: 1.8 cm  cm               LVOT: 2 cm                      LA volume/Index: 71.4 ml  Septum Systolic: /44.36ml/m^2  1. 4 cm                                           RA Area: 17.3 cm^2  CO: 6.92 l/min  CI: 4.3 l/m*m^2  LV Mass: 166.5 g  Doppler Measurements & Calculations   MV Peak E-Wave:   AV Peak Velocity: 1.99 m/s     LVOT Peak Velocity: 1.3  1.73 m/s          AV Peak Gradient: 15.86 mmHg   m/s  MV Peak A-Wave:   AV Mean Velocity: 1.4 m/s      LVOT Mean Velocity: 0.96  1.5 m/s           AV Mean Gradient: 8.8 mmHg     m/s  MV E/A Ratio:     AV VTI: 40.1 cm                LVOT Peak Gradient: 6.8  1.15              AV Area (Continuity):2.27 cm^2 mmHgLVOT Mean Gradient: 4  MV Peak Gradient:                                mmHg  16.5 mmHg         LVOT VTI: 29 cm                Estimated RAP:8 mmHg  MV Mean Gradient:  5.6 mmHg          Estimated PASP: 42.32 mmHg  MV Mean Velocity: Pulm. Vein A Reversal          TR Velocity:2.93 m/s  1.08 m/s          Duration:133.8 msec            TR Gradient:34.32 mmHg  MV Deceleration   Pulm. Vein D Velocity:0.69  Time: 183 msec    m/sPulm. Vein A Reversal  MV P1/2t: 85.8    Velocity:0.3 m/s  msec              Pulm. Vein S Velocity: 0.78    VA ED Velocity: 0.43 m/s  MVA by PHT:2.56   m/s  cm^2  MV Area  (continuity): 1.7  cm^2  [email or web address] [id number]    CT CHEST WO CONTRAST    Result Date: 4/28/2022  EXAMINATION: CT OF THE CHEST WITHOUT CONTRAST 4/27/2022 8:40 pm TECHNIQUE: CT of the chest was performed without the administration of intravenous contrast. Multiplanar reformatted images are provided for review. Dose modulation, iterative reconstruction, and/or weight based adjustment of the mA/kV was utilized to reduce the radiation dose to as low as reasonably achievable. COMPARISON: None.  HISTORY: ORDERING SYSTEM PROVIDED HISTORY: hypoxia TECHNOLOGIST PROVIDED HISTORY: Reason for exam:->hypoxia What reading provider will be dictating this exam?->CRC FINDINGS: Mediastinum: Mild adenopathy such as pretracheal and subcarinal areas, nonspecific but may relate to pulmonary findings. Cardiomegaly. Aortic stent graft. Atherosclerotic calcifications including coronary arteries. Lower esophagus has focal density, may represent retained capsule on the order of 2 cm length, or other focal ingested hyperdense substance. Lungs/pleura: Moderate right pleural effusion and small on the left. Adjacent atelectasis. There are mild ill-defined focal opacity scattered in the lungs suggesting inflammatory etiology. Most evident; however, is consolidation with air bronchograms and adjacent ill-defined opacification present in the right upper lobe. Overall findings are suggestive of multifocal infiltrates. Neoplasm is less likely but not able to be excluded. Attention on follow-up imaging is recommended as clinically warranted. Upper Abdomen: Limited evaluation. Suggestion of mild ascites. Soft Tissues/Bones: Low bone density suggested and mild spinal degenerative changes. 1. Large consolidation suggestive of pneumonia in the right upper lobe posteriorly. Multiple scattered bilateral ill-defined opacities are otherwise present suggesting additional infiltrates. Septal thickening asymmetric rightward consistent with interstitial edema. 2. Right larger than left pleural effusions. 3. Cardiomegaly. 4. Focal lower esophageal density suggesting large pill. 5. Mild ascites. XR CHEST PORTABLE    Result Date: 5/5/2022  EXAMINATION: ONE XRAY VIEW OF THE CHEST 5/5/2022 11:33 am COMPARISON: 2 May 2022 HISTORY: ORDERING SYSTEM PROVIDED HISTORY: pneumonia TECHNOLOGIST PROVIDED HISTORY: Reason for exam:->pneumonia What reading provider will be dictating this exam?->CRC FINDINGS: Single AP upright portable chest demonstrates a stent in position with improvement in aeration in the right upper lobe infiltrate.   There is blunting of the costophrenic angles with decrease in the pleural effusions and also improvement in the left mid lung infiltrate. There are atherosclerotic changes of the aorta with moderate cardiomegaly. Improvement in bilateral alveolar infiltrates especially in the right upper lobe. XR CHEST PORTABLE    Result Date: 5/2/2022  EXAMINATION: ONE XRAY VIEW OF THE CHEST 5/2/2022 8:28 am COMPARISON: Chest x-ray 04/30/2022 HISTORY: ORDERING SYSTEM PROVIDED HISTORY: follow up pna TECHNOLOGIST PROVIDED HISTORY: Reason for exam:->follow up pna What reading provider will be dictating this exam?->CRC FINDINGS: Opacification in the bilateral lungs right greater than left confluent right upper lobe involvement with increased right lower lung opacifications or effusion. Cardiac size remains enlarged. Increase in opacifications right lower lung with probable effusion component small to moderate along with persistent right upper lobe involvement of airspace disease     XR CHEST PORTABLE    Result Date: 4/30/2022  EXAMINATION: ONE XRAY VIEW OF THE CHEST 4/30/2022 10:53 am COMPARISON: Previous chest x-ray of 04/29/2022 and previous CT scan of the chest of 04/27/2022 HISTORY: ORDERING SYSTEM PROVIDED HISTORY: follow up pna TECHNOLOGIST PROVIDED HISTORY: Reason for exam:->follow up pna What reading provider will be dictating this exam?->CRC FINDINGS: The heart is enlarged. Multifocal bilateral pneumonia is again noted more prominent within the right upper lobe. The pneumonic infiltrate seen throughout the right lung are unchanged. There has been minimal interval clearing of the left perihilar airspace disease. There is a small right pleural effusion. 1. No interval change in extensive multifocal pneumonia within the right lung more prominent within the right upper lobe 2. Very minimal partial interval clearing of the left perihilar pneumonia.  3. Cardiomegaly     XR CHEST PORTABLE    Result Date: 4/29/2022  EXAMINATION: ONE XRAY VIEW OF THE CHEST 4/29/2022 9:07 am COMPARISON: 04/27/2022 HISTORY: ORDERING SYSTEM PROVIDED HISTORY: compare to previous, sob TECHNOLOGIST PROVIDED HISTORY: Reason for exam:->compare to previous, sob What reading provider will be dictating this exam?->CRC FINDINGS: Cardiomegaly is noted. There are findings compatible with a TAVR. There is some improvement in the overall density of the peripheral right upper lobe consolidation however there is new left perihilar alveolar opacity and persistent right lower lobe alveolar opacity with possible effusion. No pneumothorax. Bone density is diminished. Persistent right upper and lower lobe alveolar opacity with new left perihilar asymmetric alveolar compatible with worsening pneumonia. XR CHEST PORTABLE    Result Date: 4/27/2022  EXAMINATION: ONE XRAY VIEW OF THE CHEST 4/27/2022 7:48 am COMPARISON: 04/26/2022 HISTORY: ORDERING SYSTEM PROVIDED HISTORY: AMS,hypoxia TECHNOLOGIST PROVIDED HISTORY: Reason for exam:->AMS,hypoxia What reading provider will be dictating this exam?->CRC FINDINGS: EKG leads overlie the chest.  Valve replacement hardware again identified. Cardiac silhouette is mildly prominent but unchanged. Worsening consolidation at the base of the right upper lobe. Other patchy parenchymal opacities most notable towards the right base appear unchanged. No pneumothorax or other change. Worsening right upper lobe consolidation. Continued follow-up recommended. XR CHEST PORTABLE    Result Date: 4/26/2022  EXAMINATION: ONE XRAY VIEW OF THE CHEST 4/26/2022 1:41 pm COMPARISON: 11 September 2020 HISTORY: ORDERING SYSTEM PROVIDED HISTORY: SOB, CHF TECHNOLOGIST PROVIDED HISTORY: Reason for exam:->SOB, CHF What reading provider will be dictating this exam?->CRC FINDINGS: Multifocal bilateral pneumonia, right greater than left. TAVR present. Heart is enlarged. Normal pulmonary vascularity. Multifocal bilateral pneumonia, right greater than left. Cardiomegaly.      US DUP LOWER EXTREMITIES BILATERAL VENOUS    Result Date: 2022  Patient MRN:  [id number] : 1931 Age: 80 years Gender: Female Order Date:  2022 9:09 PM EXAM: US DUP LOWER EXTREMITIES BILATERAL VENOUS NUMBER OF IMAGES:  40 INDICATION:  r/o dvt r/o dvt What reading provider will be dictating this exam?->[facility] COMPARISON: None Within the visualized vessels, there is no evidence for deep venous thrombosis There is good compressibility, there is good augmentation, there is good color flow.      Within the visualized vessels there is no evidence for deep venous thrombosis         Last Labs on Discharge:   Recent Results (from the past 24 hour(s))   Brain Natriuretic Peptide    Collection Time: 22  6:00 AM   Result Value Ref Range    Pro-BNP 2,371 (H) 0 - 450 pg/mL   Basic Metabolic Panel w/ Reflex to MG    Collection Time: 22  6:00 AM   Result Value Ref Range    Sodium 140 132 - 146 mmol/L    Potassium reflex Magnesium 5.0 3.5 - 5.0 mmol/L    Chloride 98 98 - 107 mmol/L    CO2 36 (H) 22 - 29 mmol/L    Anion Gap 6 (L) 7 - 16 mmol/L    Glucose 102 (H) 74 - 99 mg/dL    BUN 25 (H) 6 - 23 mg/dL    CREATININE 1.0 0.5 - 1.0 mg/dL    GFR Non-African American 52 >=60 mL/min/1.73    GFR African American >60     Calcium 8.9 8.6 - 10.2 mg/dL   CBC    Collection Time: 22  6:00 AM   Result Value Ref Range    WBC 13.1 (H) 4.5 - 11.5 E9/L    RBC 3.66 3.50 - 5.50 E12/L    Hemoglobin 9.5 (L) 11.5 - 15.5 g/dL    Hematocrit 31.3 (L) 34.0 - 48.0 %    MCV 85.5 80.0 - 99.9 fL    MCH 26.0 26.0 - 35.0 pg    MCHC 30.4 (L) 32.0 - 34.5 %    RDW 17.6 (H) 11.5 - 15.0 fL    Platelets 897 089 - 721 E9/L    MPV 11.5 7.0 - 12.0 fL   COVID-19, Rapid    Collection Time: 22 11:34 AM    Specimen: Nasopharyngeal Swab; Nasal swab   Result Value Ref Range    SARS-CoV-2, NAAT Not Detected Not Detected         Follow up:  Sujatha More, DO  Future Appointments   Date Time Provider Eduardo Howe   2022  8:30 AM Yoni Greene, APRN - CNP [facility]   5/31/2022  8:30 AM [facility] CHF ROOM 1 [facility]       Note that over 30 minutes was spent in preparing discharge papers, discussing discharge with patient, medication review, etc.    Thank you Solomon Barrios DO for the opportunity to be involved in this patient's care. If you have any questions or concerns please feel free to contact me at [phone]. Electronically signed by Joe Gibson MD on 5/5/2022 at 3:00 PM    NOTE: This report was transcribed using voice recognition software. Every effort was made to ensure accuracy; however, inadvertent computerized transcription errors may be present.

## 2022-05-05 NOTE — PROGRESS NOTES
Hospitalist Progress Note    Chief complaint:  Chief Complaint   Patient presents with    Shortness of Breath     Hx of chf sent in by PCP for SOB. patient 85% on RA . Placed on 4 L NC. also C/O bilateral leg swelling    Leg Swelling        Admit date:  4/26/2022    Days in hospital:    9    Synopsis :    80y.o. year old female with a recent TAVR at [facility] October 2021, who was recently seen by her primary care provider with complaint of shortness of breath, and lower extremity edema.  Her primary care provider recommended her go to the ER for further evaluation and diuresis.      Cardiology evaluated the patient     During the course of her stay and RRT was called due to change in mental status, patient's CODE STATUS was changed to DNR CCA.       Pulmonology consulted for BiPAP dependence. A CT of the chest revealed large consolidation suggestive of pneumonia in the right upper lobe posteriorly. Vanco and meropenem was ordered IV. Chest x-ray from 4-29 shows persistent right upper and lower lobe alveolar opacity with new left perihilar asymmetric alveolar  compatible with worsening pneumonia.  Chest x-ray from 4-30 shows no interval change and extensive multifocal pneumonia within the right lung    Assessment and plan:  Principal Problem:    Acute hypoxemic respiratory failure (HCC)  Active Problems:    Chronic congestive heart failure (HCC)    History of transcatheter aortic valve replacement (TAVR)    Moderate protein-calorie malnutrition ([address]  Resolved Problems:    * No resolved hospital problems. *    Assessment:  1. Acute on chronic congestive heart failure, Hx of TAVR in oct of 2021, Echocardiogram,  LVEF is 55-60 %. Mild concentric LVH. Grade II diastolic dysfunction with elevated LA pressure. Mildly dilated right ventricle with preserved function. The left atrium is moderately dilated. Mild pulmonary hypertension with a PASP of 42 mm Hg  2.  Pneumonia, Respiratory culture shows abundant polymorphonuclear leukocytes and moderate Gram positive cocci in pairs  3. Elevated troponin at 4  4. Acute respiratory failure with hypoxia  5. CARLOS creatinine, (resolved) creatinine 0.9 today  6. Hypertension  7. Hyperlipidemia    Plan:  · S/P IV lasix . Started on Diamox   · Cont Toprol XL 25 mg, Norvasc 2.5 mg, aspirin 81 mg, Lipitor 20 mg, Catapres 0.1 mg, Plavix 75 mg, Synthroid 25 mg,  · Daily weights, Strict intake and output  · Completed course with abx   · BiPAP while sleeping   · ID and pulm following     DVT prophylaxis: Subcutaneous heparin  CODE STATUS: Patient is a full code  Discharge plan: Patient can be discharged today  Subjective:   No acute events overnight, this morning patient seen at bedside no acute distress. Patient is not reporting any chest pain. No obvious difficulty in breathing at this point and no worsening of any dyspnea reported. No abdominal pain or discomfort. No dizziness. No new complaint. All questions answered in detail at bedside. Plan discussed with nursing staff in detail.     Objective:    Physical examination:  VS: BP (!) 139/44   Pulse 78   Temp 97.8 °F (36.6 °C) (Temporal)   Resp 28   Ht 4' 11\" (1.499 m)   Wt 130 lb 8 oz (59.2 kg)   SpO2 98%   BMI 26.36 kg/m²   I/O:     Intake/Output Summary (Last 24 hours) at 5/5/2022 1108  Last data filed at 5/5/2022 1007  Gross per 24 hour   Intake 621.8 ml   Output 1600 ml   Net -978.2 ml     General Appearance: alert and oriented to person, place and time, in no acute distress  HEENT: normocephalic and atraumatic, pupils equal, round, and reactive to light, Ssupple and non-tender without mass, no thyromegaly   Cardiovascular: normal rate, regular rhythm, normal S1 and S2, no murmurs, rubs, clicks, or gallops, distal pulses intact, no carotid bruits, no JVD  Pulmonary/Chest: clear to auscultation bilaterally- no wheezes, rales or rhonchi, normal air movement, no respiratory distress  Abdomen: soft, non-tender, non-distended, normal bowel sounds, no masses   Extremities: no cyanosis, clubbing or edema, pulse   Musculoskeletal: normal range of motion, no joint swelling, deformity or tenderness  Neurological: alert, oriented, normal speech, no focal findings or movement disorder noted  Skin: warm and dry, no rash or erythema    Medications:  Scheduled Meds:   bumetanide  1 mg Oral Daily    lidocaine  5 mL IntraDERmal Once    sodium chloride flush  5-40 mL IntraVENous 2 times per day    heparin flush  1 mL IntraVENous 2 times per day    losartan  25 mg Oral Daily    metoprolol succinate  25 mg Oral Daily    pantoprazole  40 mg Oral QAM AC    potassium bicarb-citric acid  20 mEq Oral Daily    ipratropium-albuterol  1 ampule Inhalation Q4H WA    amLODIPine  10 mg Oral Daily    aspirin  81 mg Oral Daily    atorvastatin  20 mg Oral Daily    [Held by provider] cloNIDine  0.1 mg Oral BID    [Held by provider] clopidogrel  75 mg Oral Daily    polycarbophil  625 mg Oral BID    levothyroxine  25 mcg Oral Daily    rOPINIRole  4 mg Oral Nightly    vitamin D3  400 Units Oral Daily    heparin (porcine)  5,000 Units SubCUTAneous 3 times per day       PRN Meds:  sodium chloride flush, sodium chloride, heparin flush, guaiFENesin-dextromethorphan, benzonatate, perflutren lipid microspheres, hydrALAZINE, ondansetron **OR** ondansetron, polyethylene glycol, acetaminophen **OR** acetaminophen, melatonin    IV:   sodium chloride         LABS:  Recent Results (from the past 24 hour(s))   Brain Natriuretic Peptide    Collection Time: 05/05/22  6:00 AM   Result Value Ref Range    Pro-BNP 2,371 (H) 0 - 450 pg/mL   Basic Metabolic Panel w/ Reflex to MG    Collection Time: 05/05/22  6:00 AM   Result Value Ref Range    Sodium 140 132 - 146 mmol/L    Potassium reflex Magnesium 5.0 3.5 - 5.0 mmol/L    Chloride 98 98 - 107 mmol/L    CO2 36 (H) 22 - 29 mmol/L    Anion Gap 6 (L) 7 - 16 mmol/L Glucose 102 (H) 74 - 99 mg/dL    BUN 25 (H) 6 - 23 mg/dL    CREATININE 1.0 0.5 - 1.0 mg/dL    GFR Non-African American 52 >=60 mL/min/1.73    GFR African American >60     Calcium 8.9 8.6 - 10.2 mg/dL   CBC    Collection Time: 05/05/22  6:00 AM   Result Value Ref Range    WBC 13.1 (H) 4.5 - 11.5 E9/L    RBC 3.66 3.50 - 5.50 E12/L    Hemoglobin 9.5 (L) 11.5 - 15.5 g/dL    Hematocrit 31.3 (L) 34.0 - 48.0 %    MCV 85.5 80.0 - 99.9 fL    MCH 26.0 26.0 - 35.0 pg    MCHC 30.4 (L) 32.0 - 34.5 %    RDW 17.6 (H) 11.5 - 15.0 fL    Platelets 071 449 - 800 E9/L    MPV 11.5 7.0 - 12.0 fL       RADIOLOGY:  Echo Complete    Result Date: 4/28/2022  Transthoracic Echocardiography Report (TTE)  Demographics   Patient Name    Mary Orozco  Gender            Female                  F   Medical Record  [id number]     Room Number       8652  Number   Account #       [de-identified]    Procedure Date    04/28/2022   Corporate ID                 Ordering                               Physician   Accession       [id number]   Referring  Number                       Physician   Date of Birth   12/09/1931   Sonographer       Leonel DE LOS SANTOS   Age             80 year(s)   Interpreting      [address]                               Physician         Physician Cardiology                                                 Carie Trejo MD                                Any Other  Procedure Type of Study   TTE procedure:Echo Complete W/Doppler & Color Flow. Procedure Date Date: 04/28/2022 Start: 10:18 AM Study Location: Portable Technical Quality: Adequate visualization Indications:LV function and S/P TAVR. Patient Status: Routine Height: 59 inches Weight: 145 pounds BSA: 1.61 m^2 BMI: 29.29 kg/m^2 Rhythm: Within normal limits HR: 76 bpm BP: 168/66 mmHg  Findings   Left Ventricle  Normal left ventricular chamber size. Normal left ventricular systolic function. Visually estimated LVEF is 55-60 %. Mild concentric LVH. No wall motion abnormalities. Grade II diastolic dysfunction with elevated LA pressure. Right Ventricle  Mildly dilated right ventricle with preserved function. Left Atrium  The left atrium is moderately dilated. Right Atrium  Normal right atrial size   Mitral Valve  Physiologic and/or trace mitral regurgitation is present. Mitral annular calcification is present. Mild mitral stenosis. Mean gradient is 5.5 mm Hg. Tricuspid Valve  The tricuspid valve appears structurally normal.  Mild tricuspid regurgitation. Aortic Valve  There is a bioprosthetic aortic valve in place that appears well seated  with acceptable function and gradients. Trace regurgitation present. Pulmonic Valve  Normal pulmonic valve structure and function. Physiologic and/or trace pulmonic regurgitation present. Pericardial Effusion  No evidence for hemodynamically significant pericardial effusion. Pleural Effusion  No evidence of pleural effusion. Aorta  Normal aortic root and ascending aorta. Miscellaneous  Normal Inferior Vena Cava diameter. Inferior Vena Cava, <50% respiratory variation. Conclusions   Summary  Normal left ventricular chamber size. Normal left ventricular systolic function. Visually estimated LVEF is 55-60 %. Mild concentric LVH. No wall motion abnormalities. Grade II diastolic dysfunction with elevated LA pressure. Mildly dilated right ventricle with preserved function. The left atrium is moderately dilated. Normal right atrial size. Mild pulmonary hypertension with a PASP of 42 mm Hg. There is a bioprosthetic aortic valve in place that appears well seated  with acceptable function and gradients. Trace regurgitation present. No comparison study available.    Signature   ----------------------------------------------------------------  Electronically signed by Gregg Sanchez MD(Interpreting  physician) on 04/28/2022 04:05 PM  ----------------------------------------------------------------  M-Mode/2D Measurements & Calculations LV Diastolic     LV Systolic Dimension: 2.4 cm   AV Cusp Separation: 1.3  Dimension: 3.7   LV Volume Diastolic: 39.7 ml    cmAO Root Dimension: 2.3  cm               LV Volume Systolic: 85.0 ml     cm  LV FS:35.1 %     LV EDV/LV EDV Index: 58.7 ZB/87  LV PW Diastolic: RL/B^0NT ESV/LV ESV Index: 19.3  1.3 cm           ml/12ml/ m^2  LV PW Systolic:  EF Calculated: 67.1 %           RV Diastolic Dimension:  1.5 cm           LV Mass Index: 103 l/min*m^2    3. 1 cm  Septum           LV Length: 7.2 cm  Diastolic: 1.3                                   Ascending Aorta: 1.8 cm  cm               LVOT: 2 cm                      LA volume/Index: 71.4 ml  Septum Systolic:                                 /44.36ml/m^2  1. 4 cm                                           RA Area: 17.3 cm^2  CO: 6.92 l/min  CI: 4.3 l/m*m^2  LV Mass: 166.5 g  Doppler Measurements & Calculations   MV Peak E-Wave:   AV Peak Velocity: 1.99 m/s     LVOT Peak Velocity: 1.3  1.73 m/s          AV Peak Gradient: 15.86 mmHg   m/s  MV Peak A-Wave:   AV Mean Velocity: 1.4 m/s      LVOT Mean Velocity: 0.96  1.5 m/s           AV Mean Gradient: 8.8 mmHg     m/s  MV E/A Ratio:     AV VTI: 40.1 cm                LVOT Peak Gradient: 6.8  1.15              AV Area (Continuity):2.27 cm^2 mmHgLVOT Mean Gradient: 4  MV Peak Gradient:                                mmHg  16.5 mmHg         LVOT VTI: 29 cm                Estimated RAP:8 mmHg  MV Mean Gradient:  5.6 mmHg          Estimated PASP: 42.32 mmHg  MV Mean Velocity: Pulm. Vein A Reversal          TR Velocity:2.93 m/s  1.08 m/s          Duration:133.8 msec            TR Gradient:34.32 mmHg  MV Deceleration   Pulm. Vein D Velocity:0.69  Time: 183 msec    m/sPulm. Vein A Reversal  MV P1/2t: 85.8    Velocity:0.3 m/s  msec              Pulm.  Vein S Velocity: 0.78    AK ED Velocity: 0.43 m/s  MVA by PHT:2.56   m/s  cm^2  MV Area  (continuity): 1.7  cm^2 [email or web address] [id number]    CT CHEST WO CONTRAST    Result Date: 4/28/2022  EXAMINATION: CT OF THE CHEST WITHOUT CONTRAST 4/27/2022 8:40 pm TECHNIQUE: CT of the chest was performed without the administration of intravenous contrast. Multiplanar reformatted images are provided for review. Dose modulation, iterative reconstruction, and/or weight based adjustment of the mA/kV was utilized to reduce the radiation dose to as low as reasonably achievable. COMPARISON: None. HISTORY: ORDERING SYSTEM PROVIDED HISTORY: hypoxia TECHNOLOGIST PROVIDED HISTORY: Reason for exam:->hypoxia What reading provider will be dictating this exam?->CRC FINDINGS: Mediastinum: Mild adenopathy such as pretracheal and subcarinal areas, nonspecific but may relate to pulmonary findings. Cardiomegaly. Aortic stent graft. Atherosclerotic calcifications including coronary arteries. Lower esophagus has focal density, may represent retained capsule on the order of 2 cm length, or other focal ingested hyperdense substance. Lungs/pleura: Moderate right pleural effusion and small on the left. Adjacent atelectasis. There are mild ill-defined focal opacity scattered in the lungs suggesting inflammatory etiology. Most evident; however, is consolidation with air bronchograms and adjacent ill-defined opacification present in the right upper lobe. Overall findings are suggestive of multifocal infiltrates. Neoplasm is less likely but not able to be excluded. Attention on follow-up imaging is recommended as clinically warranted. Upper Abdomen: Limited evaluation. Suggestion of mild ascites. Soft Tissues/Bones: Low bone density suggested and mild spinal degenerative changes. 1. Large consolidation suggestive of pneumonia in the right upper lobe posteriorly.   Multiple scattered bilateral ill-defined opacities are otherwise present suggesting additional infiltrates. Septal thickening asymmetric rightward consistent with interstitial edema. 2. Right larger than left pleural effusions. 3. Cardiomegaly. 4. Focal lower esophageal density suggesting large pill. 5. Mild ascites. XR CHEST PORTABLE    Result Date: 5/2/2022  EXAMINATION: ONE XRAY VIEW OF THE CHEST 5/2/2022 8:28 am COMPARISON: Chest x-ray 04/30/2022 HISTORY: ORDERING SYSTEM PROVIDED HISTORY: follow up pna TECHNOLOGIST PROVIDED HISTORY: Reason for exam:->follow up pna What reading provider will be dictating this exam?->CRC FINDINGS: Opacification in the bilateral lungs right greater than left confluent right upper lobe involvement with increased right lower lung opacifications or effusion. Cardiac size remains enlarged. Increase in opacifications right lower lung with probable effusion component small to moderate along with persistent right upper lobe involvement of airspace disease     XR CHEST PORTABLE    Result Date: 4/30/2022  EXAMINATION: ONE XRAY VIEW OF THE CHEST 4/30/2022 10:53 am COMPARISON: Previous chest x-ray of 04/29/2022 and previous CT scan of the chest of 04/27/2022 HISTORY: ORDERING SYSTEM PROVIDED HISTORY: follow up pna TECHNOLOGIST PROVIDED HISTORY: Reason for exam:->follow up pna What reading provider will be dictating this exam?->CRC FINDINGS: The heart is enlarged. Multifocal bilateral pneumonia is again noted more prominent within the right upper lobe. The pneumonic infiltrate seen throughout the right lung are unchanged. There has been minimal interval clearing of the left perihilar airspace disease. There is a small right pleural effusion. 1. No interval change in extensive multifocal pneumonia within the right lung more prominent within the right upper lobe 2. Very minimal partial interval clearing of the left perihilar pneumonia.  3. Cardiomegaly     XR CHEST PORTABLE    Result Date: 4/29/2022  EXAMINATION: ONE XRAY VIEW OF THE CHEST 4/29/2022 9:07 am COMPARISON: 04/27/2022 HISTORY: ORDERING SYSTEM PROVIDED HISTORY: compare to previous, sob TECHNOLOGIST PROVIDED HISTORY: Reason for exam:->compare to previous, sob What reading provider will be dictating this exam?->CRC FINDINGS: Cardiomegaly is noted. There are findings compatible with a TAVR. There is some improvement in the overall density of the peripheral right upper lobe consolidation however there is new left perihilar alveolar opacity and persistent right lower lobe alveolar opacity with possible effusion. No pneumothorax. Bone density is diminished. Persistent right upper and lower lobe alveolar opacity with new left perihilar asymmetric alveolar compatible with worsening pneumonia. XR CHEST PORTABLE    Result Date: 4/27/2022  EXAMINATION: ONE XRAY VIEW OF THE CHEST 4/27/2022 7:48 am COMPARISON: 04/26/2022 HISTORY: ORDERING SYSTEM PROVIDED HISTORY: AMS,hypoxia TECHNOLOGIST PROVIDED HISTORY: Reason for exam:->AMS,hypoxia What reading provider will be dictating this exam?->CRC FINDINGS: EKG leads overlie the chest.  Valve replacement hardware again identified. Cardiac silhouette is mildly prominent but unchanged. Worsening consolidation at the base of the right upper lobe. Other patchy parenchymal opacities most notable towards the right base appear unchanged. No pneumothorax or other change. Worsening right upper lobe consolidation. Continued follow-up recommended. XR CHEST PORTABLE    Result Date: 4/26/2022  EXAMINATION: ONE XRAY VIEW OF THE CHEST 4/26/2022 1:41 pm COMPARISON: 11 September 2020 HISTORY: ORDERING SYSTEM PROVIDED HISTORY: SOB, CHF TECHNOLOGIST PROVIDED HISTORY: Reason for exam:->SOB, CHF What reading provider will be dictating this exam?->CRC FINDINGS: Multifocal bilateral pneumonia, right greater than left. TAVR present. Heart is enlarged. Normal pulmonary vascularity. Multifocal bilateral pneumonia, right greater than left. Cardiomegaly.      US DUP LOWER EXTREMITIES BILATERAL VENOUS    Result Date: 2022  Patient MRN:  [id number] : 1931 Age: 80 years Gender: Female Order Date:  2022 9:09 PM EXAM: US DUP LOWER EXTREMITIES BILATERAL VENOUS NUMBER OF IMAGES:  40 INDICATION:  r/o dvt r/o dvt What reading provider will be dictating this exam?->[facility] COMPARISON: None Within the visualized vessels, there is no evidence for deep venous thrombosis There is good compressibility, there is good augmentation, there is good color flow. Within the visualized vessels there is no evidence for deep venous thrombosis           Electronically signed by Tala Kowalski MD on 2022 at 11:08 AM  NOTE: This report was transcribed using voice recognition software. Every effort was made to ensure accuracy; however, inadvertent computerized transcription errors may be present.

## 2022-05-05 NOTE — PATIENT CARE CONFERENCE
P Quality Flow/Interdisciplinary Rounds Progress Note        Quality Flow Rounds held on May 5, 2022    Disciplines Attending:  Bedside Nurse, ,  and Nursing Unit Leadership    Pamela Goodman was admitted on 4/26/2022  2:32 PM    Anticipated Discharge Date:  Expected Discharge Date: 05/06/22    Disposition:    Avi Score:  Avi Scale Score: 17    Readmission Risk              Risk of Unplanned Readmission:  16           Discussed patient goal for the day, patient clinical progression, and barriers to discharge.   The following Goal(s) of the Day/Commitment(s) have been identified:  Education/Learning - [address], RN  May 5, 2022

## 2022-05-05 NOTE — PROGRESS NOTES
140 05/05/2022    K 5.0 05/05/2022    CL 98 05/05/2022    CO2 36 05/05/2022    BUN 25 05/05/2022    CREATININE 1.0 05/05/2022    GFRAA >60 05/05/2022    LABGLOM 52 05/05/2022    GLUCOSE 102 05/05/2022    GLUCOSE 102 04/06/2012    PROT 6.6 04/27/2022    LABALBU 3.3 04/27/2022    LABALBU 4.7 04/06/2012    CALCIUM 8.9 05/05/2022    BILITOT 0.4 04/27/2022    ALKPHOS 154 04/27/2022    AST 38 04/27/2022    ALT 21 04/27/2022     ABG:    Lab Results   Component Value Date    PH 7.416 05/02/2022    PCO2 77.3 05/02/2022    PO2 81.1 05/02/2022    HCO3 48.6 05/02/2022    BE 20.2 05/02/2022    O2SAT 95.6 05/02/2022       Assessment:   1. Pneumonia  2. Acute hypoxemic respiratory failure  3. Hypercarbic respiratory failure  4. CKD  5. Protein calorie malnutrition  6. Leukocytosis  7. Continue BiPAP at at bedtime and with naps. 8.  Contraction alkalosis        Plan:   1. Sputum's culture with gram-positive cocci on gram stain. However did not speciate  2. CO2 improved on BMP   3. ID following  4. Flutter valve ordered. 5. Wean FiO2 as tolerated  6. DuoNebs every 4 hours while awake  7. Continue BiPAP at at bedtime and with naps. 8. Mucolytics  9.  LTAC?     TJB, DO   Pulmonary, Critical Care and Sleep Medicine

## 2022-05-05 NOTE — CARE COORDINATION
Anticipated discharge today, awaiting pulm and ID. Pt on PO bumex and no antibiotics. Pt going to [address], spoke with Juliano Sidhu, they will transport at 5pm, anticipating discharge order. Updated pt, bedside RN, charge RN, unit secretary, and daughter of  time. COVID test given to bedside RN. Envelope and ambullete form in soft chart. PASSR completed and copy in soft chart. Miles Oakes, MSW, LSW

## 2022-05-23 ENCOUNTER — OFFICE VISIT (OUTPATIENT)
Dept: CARDIOLOGY CLINIC | Age: 87
End: 2022-05-23
Payer: MEDICARE

## 2022-05-23 VITALS
OXYGEN SATURATION: 90 % | HEIGHT: 59 IN | BODY MASS INDEX: 26.41 KG/M2 | RESPIRATION RATE: 18 BRPM | DIASTOLIC BLOOD PRESSURE: 60 MMHG | HEART RATE: 60 BPM | WEIGHT: 131 LBS | SYSTOLIC BLOOD PRESSURE: 124 MMHG

## 2022-05-23 DIAGNOSIS — I50.32 CHRONIC DIASTOLIC CONGESTIVE HEART FAILURE (HCC): Primary | ICD-10-CM

## 2022-05-23 PROCEDURE — 99214 OFFICE O/P EST MOD 30 MIN: CPT | Performed by: NURSE PRACTITIONER

## 2022-05-23 PROCEDURE — G8427 DOCREV CUR MEDS BY ELIG CLIN: HCPCS | Performed by: NURSE PRACTITIONER

## 2022-05-23 PROCEDURE — G8417 CALC BMI ABV UP PARAM F/U: HCPCS | Performed by: NURSE PRACTITIONER

## 2022-05-23 PROCEDURE — 1123F ACP DISCUSS/DSCN MKR DOCD: CPT | Performed by: NURSE PRACTITIONER

## 2022-05-23 PROCEDURE — 1111F DSCHRG MED/CURRENT MED MERGE: CPT | Performed by: NURSE PRACTITIONER

## 2022-05-23 PROCEDURE — 1036F TOBACCO NON-USER: CPT | Performed by: NURSE PRACTITIONER

## 2022-05-23 PROCEDURE — 93000 ELECTROCARDIOGRAM COMPLETE: CPT | Performed by: INTERNAL MEDICINE

## 2022-05-23 PROCEDURE — 1090F PRES/ABSN URINE INCON ASSESS: CPT | Performed by: NURSE PRACTITIONER

## 2022-05-23 RX ORDER — DOCUSATE SODIUM 100 MG/1
100 CAPSULE, LIQUID FILLED ORAL DAILY
COMMUNITY

## 2022-05-23 RX ORDER — FUROSEMIDE 20 MG/1
20 TABLET ORAL PRN
COMMUNITY
End: 2022-05-23 | Stop reason: ALTCHOICE

## 2022-05-23 NOTE — PATIENT INSTRUCTIONS
1. Continue current cardiac medications    2. Go to CHF clinic as scheduled next week     3. Consider reducing norvasc if blood pressure allows    4. Elevate your legs as much as possible     5. Follow up with Dr. Jumana Baumann in 1-2 months    6. Weigh yourself daily    -Stay Hydrated    -Diet should sodium restricted to 2 grams    -Again watch your daily weight trends and if you gain water weight please follow below instructions.    -If you gain 3-5 pounds in 2-3 days OR notice that you are retaining fluid in anyway just like you did before then take an extra dose of your water pill (Bumetanide/Bumex) every day until you lose the weight or feel better.    -If you notice that you have taken more than 2 extra doses in 1 week then please call and let us know. -If at any time you feel that you are retaining fluid, your medications are not working, or you feel ill in anyway, then please call us for either same day appointment or the next day, and for instructions. Our goal is to keep you out of the emergency room and the hospital and we have ways to do it. You just need to call us in a timely manner.     -If you become sick for other reasons, and notice that you are not urinating as much, the urine is very dark, you have significant diarrhea or vomiting, then please DO NOT take your water pill and CALL US immediately.

## 2022-05-23 NOTE — PROGRESS NOTES
[facility]  Office Visit         Reason for Visit: Heart failure    Primary Cardiologist: Dr. Dominguez Boston > now will follow with Dr. Duncan Benitez         History of Present Illness:     Mr. Jai Kimbrough is a 80 year female with a PMHx of chronic HFpEF, s/p TAVR, HTN, CKD, hypothyroidism, chronic anemia, DNR CCA. She recently presented to the emergency room with complaints of increased shortness of breath, orthopnea and lower extremity edema. Upon arrival she was hypoxic 85% and admitted to the hospital for acute hypoxic respiratory failure in the setting of pneumonia and acute HFpEF. She was actively diuresed and treated with IV antibiotics. During hospitalization she was hypertensive and medications were adjusted. During hospitalization she underwent TTE that demonstrated LVEF 55-60%, no WMA, grade 2 DD, mildly dilated RV with preserved function and mild TR, mild MS with mean gradient of 5.5 mmHg, bioprosthetic aortic valve well-seated with acceptable function and gradients. She was discharged from the hospital to SNF facility, initiated on bumetanide 1 mg daily and amlodipine was increased from 5 mg daily to 10 mg daily. She presents today in hospital follow-up, since discharge from the hospital she returned to home from rehab facility on Saturday. She lives with her daughter who assisted her care, upon discharge from rehab she had increased bilateral lower extremity edema. This weekend her daughter monitoring her diet closely for sodium content, elevated legs and she had a 4 pound weight loss with improvement in her lower extremities. She has chronic dyspnea with exertion, denies worsening shortness of breath, or decline in overall functional capacity. She denies orthopnea, PND, nocturnal cough or hemoptysis. She denies abdominal distention or bloating, early satiety, anorexia/change in appetite, unintentional weight loss. She does lower extremity edema. She denies exertional lightheadedness.   She denies palpitations, syncope or near syncope. Review of systems is negative for chest pain, pressure, discomfort. When ambulating on an incline, She does not leg claudication. History is negative for neurological symptoms including transient loss of vision, asymmetric weakness, aphasia, dysphasia, numbness, tingling. Patient Active Problem List    Diagnosis Date Noted    HTN (hypertension) 02/15/2014     Priority: High    Moderate protein-calorie malnutrition ([facility] 05/03/2022     Priority: Medium    Acute hypoxemic respiratory failure (HCC)      Priority: Medium    History of transcatheter aortic valve replacement (TAVR)      Priority: Medium    Chronic congestive heart failure ([address] 04/26/2022     Priority: Medium    HLD (hyperlipidemia) 02/15/2014     Priority: Low    Near syncope 09/12/2020    Closed subtrochanteric fracture of left femur with delayed healing 05/25/2016    Closed fracture of left femur (HCC)     DJD (degenerative joint disease) of knee 11/07/2013    Knee pain 11/07/2013    Hip pain 01/24/2012    Aseptic loosening of prosthetic hip ([facility] [address] 01/24/2012           Past Medical History:   Diagnosis Date    Arthritis     Cataract     Closed subtrochanteric fracture of left femur with delayed healing 05/25/2016    Heart murmur     History of cardiovascular stress test 02/17/2014    lexiscan    History of transcatheter aortic valve replacement (TAVR)     Hyperlipidemia     Hypertension      PAST MEDICAL HISTORY:       1. HFpEF:  ? Echocardiogram (2/15/2014): All cardiac chamber sizes including aortic root size are within normallimits. The left ventricle shows normal wall thicknesses.  Diastolic filling dysfunction stage 1 is seen.  Systolic function is well preserved with ejection fraction estimated at 66%.  No focal wall motion abnormality is seen.  The mitral valve shows thickening of the leaflets.  No significant mitral stenosis.  Mitral valve area 3.3 cm2 by pressure half-time with a maximal gradient of 4.6 mmHg.  There is no mitral stenosis.  There is 1+ to at most 2+ mitral regurgitation. Aortic valve appears structurally normal, is sclerotic.  Maximal gradient 17 mmHg.  Mean gradient 9.9.  There is mild aortic stenosis with trace aortic insufficiency present.  Aortic valve area estimated at 1.6 cm2. There is no pulmonic stenosis or insufficiency.  There is 2+ tricuspid regurgitation with pulmonary hypertension at 40 mmHg. There is no significant pericardial effusion or any definite intracavitary mass, or thrombus, or shunt identified on this study. ? Echocardiogram (9/12/2020):  Summary: Ejection fraction is visually estimated at 61%. Overall ejection fraction is normal. There is doppler evidence of stage I diastolic dysfunction. Physiologic and/or trace mitral regurgitation is present. Mild mitral annular calcification. The aortic valve appears moderately sclerotic. Mild aortic regurgitation is noted. Moderate aortic stenosis. Mild tricuspid regurgitation. ? Lexiscan stress test (2/17/2014): LVEF equals 73%. Gated wall motion examination was performed in conjunction with cardiac SPECT imaging. Left ventricular wall motion is fairly uniform with no demonstration of focal or global hypokinesia. There is no demonstration of left ventricular dilatation. Impression: No fixed or reversible perfusion abnormality involving the left ventricle induced by Lexiscan stress. 2. Aortic stenosis with TAVR ( 2021): Awaiting records at this time. 3. Hypertension, controlled  4. Hyperlipidemia on Lipitor  5. Hypothyroidism on HRT  6. Left femur fracture with repair, cataracts, appendectomy, left hip surgery, right hip replacement,skin CA removal on face.         Past Surgical History:   Procedure Laterality Date    APPENDECTOMY      HIP SURGERY      pins in L hip    HIP SURGERY Left 8/20/15    im nail pinning and hardware removal    HYSTERECTOMY      JOINT REPLACEMENT  5/10/2012 total r hip    PELVIC FRACTURE SURGERY  2008    REVISION TOTAL HIP ARTHROPLASTY      R hip 1999    SKIN CANCER EXCISION      facial and        Allergies   Allergen Reactions    Elemental Sulfur     Ibuprofen     Penicillins          Outpatient Medications Marked as Taking for the 5/23/22 encounter (Office Visit) with MICKI De La Rosa CNP   Medication Sig Dispense Refill    docusate sodium (COLACE) 100 MG capsule Take 100 mg by mouth daily      bumetanide (BUMEX) 1 MG tablet Take 1 tablet by mouth daily 30 tablet 3    rOPINIRole (REQUIP) 4 MG tablet Take 4 mg by mouth 2 times daily      potassium chloride (KLOR-CON) 10 MEQ extended release tablet Take 20 mEq by mouth daily       omeprazole (PRILOSEC) 40 MG delayed release capsule Take 40 mg by mouth daily      amLODIPine (NORVASC) 10 MG tablet Take 0.5 tablets by mouth daily (Patient taking differently: Take 10 mg by mouth daily ) 30 tablet 3    metoprolol succinate (TOPROL XL) 25 MG extended release tablet Take 25 mg by mouth daily       losartan (COZAAR) 25 MG tablet Take 25 mg by mouth daily      atorvastatin (LIPITOR) 20 MG tablet Take 20 mg by mouth daily      clopidogrel (PLAVIX) 75 MG tablet Take 1 tablet by mouth daily When cleared with orthopedics 30 tablet 0    levothyroxine (SYNTHROID) 25 MCG tablet Take 25 mcg by mouth Daily      aspirin 81 MG EC tablet Take 1 tablet by mouth daily. 30 tablet 3    VITAMIN D, CHOLECALCIFEROL, PO Take 400 mg by mouth daily. 5 pm           Review of Systems:   Cardiac: As per HPI  General: No fever, chills, rigors  Pulmonary: As per HPI  HEENT: No visual disturbances, difficult swallowing  GI: No nausea, vomiting, abdominal pain  : No dysuria or hematuria  Endocrine: No thyroid disease or diabetes  Musculoskeletal: AVELAR x 4, no focal motor deficits  Skin: Intact, no rashes  Neuro/Psych: No headache or seizures          Weights:   Wt Readings from Last 3 Encounters:   05/23/22 131 lb (59.4 kg) 05/05/22 138 lb 14.2 oz (63 kg)   09/12/20 133 lb 0.1 oz (60.3 kg)             Physical Examination:     /60   Pulse 60   Resp 18   Ht 4' 11\" (1.499 m)   Wt 131 lb (59.4 kg)   SpO2 90%   BMI 26.46 kg/m²     CONSTITUTIONAL: Alert and oriented times 3, no acute distress and cooperative to examination with proper mood and affect. SKIN: Skin color, texture, turgor normal. No rashes or lesions. LYMPH: no cervical nodes, no inguinal nodes  HEENT: Head is normocephalic, atraumatic. EOMI, PERRLA. NECK: Supple, symmetrical, trachea midline, no adenopathy, thyroid symmetric, not enlarged and no tenderness, skin normal.  CHEST/LUNGS: chest symmetric with normal A/P diameter, normal respiratory rate and rhythm, lungs clear to auscultation without wheezes, rales or rhonchi. No accessory muscle use. Scars None   CARDIOVASCULAR: Heart sounds are normal.  Regular rate and rhythm with III/VI systolic murmur, gallop or rub. Normal S1 and S2. . Carotid and femoral pulses 2+/4 and equal bilaterally. ABDOMEN: Normal shape. No and Laparoscopic scar(s) present. Normal bowel sounds. No bruits. soft, nondistended, no masses or organomegaly. no evidence of hernia. Percussion: Normal without hepatosplenomegally. Tenderness: absent. RECTAL: deferred, not clinically indicated  NEUROLOGIC: There are no focalizing motor or sensory deficits. CN II-XII are grossly intact. Hernandez Rathke EXTREMITIES: no cyanosis, no clubbing. 1+ bilateral lower extremity edema          All the following diagnostics were personally reviewed and interpreted by me.        LAB DATA:     5/4/2022 05:10 5/5/2022 06:00   Sodium 141 140   Potassium 4.8 5.0   Chloride 101 98   CO2 34 (H) 36 (H)   BUN,BUNPL 18 25 (H)   Creatinine 1.1 (H) 1.0   Anion Gap 6 (L) 6 (L)   GFR Non- 47 52   GFR  56 >60   GLUCOSE, FASTING, (H) 102 (H)   CALCIUM, SERUM, 222991 9.0 8.9   Pro-BNP  2,371 (H)   WBC 17.1 (H) 13.1 (H)   RBC 3.80 3.66   Hemoglobin Quant 10.0 (L) 9.5 (L)   Hematocrit 32.8 (L) 31.3 (L)   MCV 86.3 85.5   MCH 26.3 26.0   MCHC 30.5 (L) 30.4 (L)   MPV 11.4 11.5   RDW 17.4 (H) 17.6 (H)   Platelet Count 146 113       IMAGING:    CXR (5/5/2022)  FINDINGS:  Single AP upright portable chest demonstrates a stent in position with  improvement in aeration in the right upper lobe infiltrate.  There is  blunting of the costophrenic angles with decrease in the pleural effusions  and also improvement in the left mid lung infiltrate.  There are  atherosclerotic changes of the aorta with moderate cardiomegaly. Impression  Improvement in bilateral alveolar infiltrates especially in the right upper lobe.       CARDIAC TESTING:    TTE (9/12/2020)   Summary:   Ejection fraction is visually estimated at 61%. Overall ejection fraction is normal .   There is doppler evidence of stage I diastolic dysfunction. Physiologic and/or trace mitral regurgitation is present. Mild mitral annular calcification. The aortic valve appears moderately sclerotic. Mild aortic regurgitation is noted. Moderate aortic stenosis. Mild tricuspid regurgitation. TTE (4/28/2022)   Summary:   Normal left ventricular chamber size. Normal left ventricular systolic function. Visually estimated LVEF is 55-60 %. Mild concentric LVH. No wall motion abnormalities. Grade II diastolic dysfunction with elevated LA pressure. Mildly dilated right ventricle with preserved function. The left atrium is moderately dilated. Normal right atrial size. Mild pulmonary hypertension with a PASP of 42 mm Hg. There is a bioprosthetic aortic valve in place that appears well seated   with acceptable function and gradients. Trace regurgitation present. No comparison study available. EKG  Sinus Rhythm       ASSESSMENT:  1. Chronic HFpEF  2. ACC stage C / NYHA class III  3. Near euvolemic with some dependent lower extremity edema  4. Severe AS s/p TAVR  5. HTN  6. CKD  7.  HLD on Lipitor  8. Hypothyroidism on HRT  9. Chronic anemia  10. DNR CCA      PLAN:  1. Continue current cardiac medications    2. Go to CHF clinic as scheduled next week     3. Consider reducing norvasc if blood pressure allows to help with lower extremity edema    4. Elevate your legs as much as possible     5. Follow up with Dr. Louann Phelan in 1-2 months    6. Weigh yourself daily    -Stay Hydrated    -Diet should sodium restricted to 2 grams    -Again watch your daily weight trends and if you gain water weight please follow below instructions.    -If you gain 3-5 pounds in 2-3 days OR notice that you are retaining fluid in anyway just like you did before then take an extra dose of your water pill (Bumetanide/Bumex) every day until you lose the weight or feel better.    -If you notice that you have taken more than 2 extra doses in 1 week then please call and let us know. -If at any time you feel that you are retaining fluid, your medications are not working, or you feel ill in anyway, then please call us for either same day appointment or the next day, and for instructions. Our goal is to keep you out of the emergency room and the hospital and we have ways to do it. You just need to call us in a timely manner.     -If you become sick for other reasons, and notice that you are not urinating as much, the urine is very dark, you have significant diarrhea or vomiting, then please DO NOT take your water pill and CALL US immediately.     Judith SWEET-CNP  [facility]

## 2022-05-30 NOTE — PROGRESS NOTES
Physician Progress Note      PATIENT:               Rachana Simon  CSN #:                  [id number]  :                       1931  ADMIT DATE:       2022 2:32 PM  [address] [place] DATE:        2022 5:23 PM  RESPONDING  PROVIDER #:        Jannet Stack MD          QUERY TEXT:    Pt admitted with acute hypoxic respiratory failure, CHF. Pt noted to have   pneumonia and respiratory culture revealed  Moderate Gram positive cocci in   pairs, abundant polymorphonuclear leukocytes . If possible, please document in   the progress notes and discharge summary if you are evaluating and/or   treating any of the following:      Note: CAP and HCAP indicate where the pneumonia was acquired, not a specific   type. The medical record reflects the following:  Risk Factors: CHF, respiratory failure, CARLOS, Htn  Clinical Indicators:  respiratory culture- Moderate Gram positive cocci in   pairs, abundant polymorphonuclear leukocytes. DC summary - Completed   course with abx, Pneumonia. BiPAP while sleeping.  ID PN-  patient 85% on   RA . Placed on 4 L NC. also C/O bilateral leg swelling. Arcenio Founds Hx of chf sent in by   PCP for SOB. A CT of the chest revealed large consolidation suggestive of   pneumonia in the right upper lobe posteriorly. Vanco and meropenem was ordered   IV. Chest x-ray from  shows persistent right upper and lower lobe   alveolar opacity with new left perihilar asymmetric  Treatment: IV abx, monitoring, respiratory culture, Bipap, supplemental 02,   CT, CXR    Thank you  Shanita Webb RN  Clinical   [phone]  Options provided:  -- Gram negative pneumonia  -- Gram positive pneumonia  -- Bacterial pneumonia  -- Other pneumonia, ## please specify, please specify.   -- Other - I will add my own diagnosis  -- Disagree - Not applicable / Not valid  -- Disagree - Clinically unable to determine / Unknown  -- Refer to Clinical Documentation Reviewer    PROVIDER RESPONSE TEXT:    This patient has bacterial pneumonia.     Query created by: Iona Hurst on 5/12/2022 1:51 PM      Electronically signed by:  Jose Garnica MD 5/30/2022 4:44 PM

## 2022-05-31 ENCOUNTER — HOSPITAL ENCOUNTER (OUTPATIENT)
Dept: OTHER | Age: 87
Setting detail: THERAPIES SERIES
Discharge: HOME | End: 2022-05-31
Payer: MEDICARE

## 2022-05-31 VITALS
DIASTOLIC BLOOD PRESSURE: 72 MMHG | OXYGEN SATURATION: 97 % | SYSTOLIC BLOOD PRESSURE: 156 MMHG | WEIGHT: 126 LBS | RESPIRATION RATE: 18 BRPM | BODY MASS INDEX: 25.45 KG/M2 | HEART RATE: 64 BPM

## 2022-05-31 LAB
ANION GAP SERPL CALCULATED.3IONS-SCNC: 12 MMOL/L (ref 7–16)
BUN BLDV-MCNC: 24 MG/DL (ref 6–23)
CALCIUM SERPL-MCNC: 9.1 MG/DL (ref 8.6–10.2)
CHLORIDE BLD-SCNC: 98 MMOL/L (ref 98–107)
CO2: 30 MMOL/L (ref 22–29)
CREAT SERPL-MCNC: 1.3 MG/DL (ref 0.5–1)
GFR AFRICAN AMERICAN: 47
GFR NON-AFRICAN AMERICAN: 38 ML/MIN/1.73
GLUCOSE BLD-MCNC: 107 MG/DL (ref 74–99)
POTASSIUM SERPL-SCNC: 4 MMOL/L (ref 3.5–5)
PRO-BNP: 1274 PG/ML (ref 0–450)
SODIUM BLD-SCNC: 140 MMOL/L (ref 132–146)

## 2022-05-31 PROCEDURE — 80048 BASIC METABOLIC PNL TOTAL CA: CPT

## 2022-05-31 PROCEDURE — 99204 OFFICE O/P NEW MOD 45 MIN: CPT

## 2022-05-31 PROCEDURE — 83880 ASSAY OF NATRIURETIC PEPTIDE: CPT

## 2022-05-31 PROCEDURE — 36415 COLL VENOUS BLD VENIPUNCTURE: CPT

## 2022-05-31 NOTE — PROGRESS NOTES
Congestive Heart Failure [address]         Josefina Cabrales   12/9/1931          Referring Provider: Janina Gregory  Primary Care Physician: Dr. Blair Oliva  Cardiologist: Dr. Irena Vasques  Nephrologist:         History of Present Illness:     Josefina Carbales is a 80 y.o. female with a history of HFpEF, most recent EF 55-60% on 4/28/22. Patient Story:    She does not  have dyspnea with exertion, shortness of breath, or decline in overall functional capacity. She does not have orthopnea, PND, nocturnal cough or hemoptysis. She does not have abdominal distention or bloating, early satiety, anorexia/change in appetite. She does has a good urinary response to  oral diuretic. She does have  lower extremity edema. She denies lightheadedness, dizziness. She denies palpitations, syncope or near syncope. She does not complain of chest pain, pressure, discomfort. Allergies   Allergen Reactions    Elemental Sulfur     Ibuprofen     Penicillins        Prior to Visit Medications    Medication Sig Taking?  Authorizing Provider   docusate sodium (COLACE) 100 MG capsule Take 100 mg by mouth daily  Historical Provider, MD   bumetanide (BUMEX) 1 MG tablet Take 1 tablet by mouth daily  Dennis Rodriguez MD   rOPINIRole (REQUIP) 4 MG tablet Take 4 mg by mouth 2 times daily  Historical Provider, MD   potassium chloride (KLOR-CON) 10 MEQ extended release tablet Take 20 mEq by mouth daily   Historical Provider, MD   omeprazole (PRILOSEC) 40 MG delayed release capsule Take 40 mg by mouth daily  Historical Provider, MD   amLODIPine (NORVASC) 10 MG tablet Take 0.5 tablets by mouth daily  Dennis Rodriguez MD   metoprolol succinate (TOPROL XL) 25 MG extended release tablet Take 25 mg by mouth daily   Historical Provider, MD   losartan (COZAAR) 25 MG tablet Take 25 mg by mouth daily  Historical Provider, MD   atorvastatin (LIPITOR) 20 MG tablet Take 20 mg by mouth daily  Historical Provider, MD   clopidogrel (PLAVIX) 75 MG tablet Take 1 tablet by mouth daily When cleared with orthopedics  Patient taking differently: Take 75 mg by mouth daily   Bryan Reilly DO   levothyroxine (SYNTHROID) 25 MCG tablet Take 25 mcg by mouth Daily  Historical Provider, MD   aspirin 81 MG EC tablet Take 1 tablet by mouth daily. Ginette Blanchard DO   VITAMIN D, CHOLECALCIFEROL, PO Take 400 mg by mouth daily. 5 pm  Historical Provider, MD           Guideline directed medical:  ARNI/ACE I/ARB: Yes  Beta blocker:   Yes  Aldosterone antagonist:  No        Physical Examination:     BP (!) 162/71   Resp 18   Wt 126 lb (57.2 kg)   SpO2 97%   BMI 25.45 kg/m²     Assessment  Charting Type: Shift assessment (chf clinic)    Neurological  Level of Consciousness: Alert (0)  Orientation Level: Oriented X4  Cognition: Follows commands              Respiratory  Respiratory Pattern: Regular  Respiratory Depth: Normal  Respiratory Quality/Effort: Unlabored  Chest Assessment: Trachea midline,Chest expansion symmetrical  L Breath Sounds: Diminished  R Breath Sounds: Diminished                             Gastrointestinal  Abdominal (WDL): Exceptions to WDL  Abdomen Inspection: Soft,Flat               Peripheral Vascular  Peripheral Vascular (WDL): Exceptions to WDL  Edema: Left lower extremity,Right lower extremity  RLE Edema: +1,Pitting  LLE Edema: +1,Pitting                        Psychosocial  Psychosocial (WDL): Within Defined Limits                                              LAB DATA:    Last 3 BMP      Sodium (mmol/L)   Date Value   05/05/2022 140   05/04/2022 141   05/03/2022 139     Potassium reflex Magnesium (mmol/L)   Date Value   05/05/2022 5.0   05/04/2022 4.8   05/03/2022 4.2     Chloride (mmol/L)   Date Value   05/05/2022 98   05/04/2022 101   05/03/2022 98     CO2 (mmol/L)   Date Value   05/05/2022 36 (H)   05/04/2022 34 (H)   05/03/2022 36 (H)     BUN (mg/dL)   Date Value   05/05/2022 25 (H)   05/04/2022 18 05/03/2022 14     Glucose (mg/dL)   Date Value   05/05/2022 102 (H)   05/04/2022 107 (H)   05/03/2022 98   04/06/2012 102   11/04/2011 87   04/19/2011 89     Calcium (mg/dL)   Date Value   05/05/2022 8.9   05/04/2022 9.0   05/03/2022 9.2       Last 3 BNP       Pro-BNP (pg/mL)   Date Value   05/05/2022 2,371 (H)   05/02/2022 4,698 (H)   05/01/2022 4,489 (H)          CBC: No results for input(s): WBC, HGB, PLT in the last 72 hours. BMP:  No results for input(s): NA, K, CL, CO2, BUN, CREATININE, GLUCOSE in the last 72 hours. Hepatic: No results for input(s): AST, ALT, ALB, BILITOT, ALKPHOS in the last 72 hours. Troponin: No results for input(s): TROPONINI in the last 72 hours. BNP: No results for input(s): BNP in the last 72 hours. Lipids: No results for input(s): CHOL, HDL in the last 72 hours. Invalid input(s): LDLCALCU  INR: No results for input(s): INR in the last 72 hours. WEIGHTS:    Wt Readings from Last 3 Encounters:   05/31/22 126 lb (57.2 kg)   05/23/22 131 lb (59.4 kg)   05/05/22 138 lb 14.2 oz (63 kg)         TELEMETRY:  Cardiac Regularity: Regular  Cardiac Rhythm/Interpretation: NSR        ASSESSMENT:  Iker Ugalde presented today with daughter Richard for initial CHF visit. Discussed with patient and daughter the purpose of CHF clinic and importance of daily weights and doing a self check every day to monitor for changes. Went over the three heart failure zones and to call cardiologist if in yellow zone immediately to prevent any further decline. Patient has a working scale. Patient is agreeable to future CHF clinic visits. Next 3 consecutive weekly appointments made today so that patient has a total of 4 visits within first 30 days. Patient recently discharged from hospital on May 5th with oxygen and went to rehab. Since discharged from rehab patient has been weaned off supplemental oxygen. Patient weighs herself daily and follows a low sodium diet.  Follow up appointment made for 1 month.    Interventions completed this visit:  IV diuretics given no  Lab work obtained yes, bnp, bmp   Reviewed currently prescribed medications with patient, educated on importance of compliance and answered any questions regarding their medication  Educated on signs and symptoms of HF  Educated on low sodium diet    PLAN:  Scheduled to follow up in CHF clinic on June 28th. Given clinic phone number and aware of signs and symptoms to call with any HF change in symptoms.

## 2022-06-28 ENCOUNTER — HOSPITAL ENCOUNTER (OUTPATIENT)
Dept: OTHER | Age: 87
Setting detail: THERAPIES SERIES
Discharge: HOME | End: 2022-06-28

## 2022-06-28 NOTE — PROGRESS NOTES
Patient's daughter called and cancelled today's appointment. States her mom is doing very well, and will call us if needed.

## 2022-09-20 ENCOUNTER — HOSPITAL ENCOUNTER (OUTPATIENT)
Age: 87
Discharge: HOME | End: 2022-09-20
Payer: MEDICARE

## 2022-09-20 LAB
ALBUMIN SERPL-MCNC: 4.3 G/DL (ref 3.5–5.2)
ALP BLD-CCNC: 121 U/L (ref 35–104)
ALT SERPL-CCNC: 10 U/L (ref 0–32)
ANION GAP SERPL CALCULATED.3IONS-SCNC: 10 MMOL/L (ref 7–16)
AST SERPL-CCNC: 23 U/L (ref 0–31)
BASOPHILS ABSOLUTE: 0.05 E9/L (ref 0–0.2)
BASOPHILS RELATIVE PERCENT: 0.9 % (ref 0–2)
BILIRUB SERPL-MCNC: 0.4 MG/DL (ref 0–1.2)
BUN BLDV-MCNC: 34 MG/DL (ref 6–23)
CALCIUM SERPL-MCNC: 9.1 MG/DL (ref 8.6–10.2)
CHLORIDE BLD-SCNC: 97 MMOL/L (ref 98–107)
CHOLESTEROL, TOTAL: 199 MG/DL (ref 0–199)
CO2: 33 MMOL/L (ref 22–29)
CREAT SERPL-MCNC: 1.5 MG/DL (ref 0.5–1)
EOSINOPHILS ABSOLUTE: 0.31 E9/L (ref 0.05–0.5)
EOSINOPHILS RELATIVE PERCENT: 5.3 % (ref 0–6)
GFR AFRICAN AMERICAN: 39
GFR NON-AFRICAN AMERICAN: 33 ML/MIN/1.73
GLUCOSE BLD-MCNC: 100 MG/DL (ref 74–99)
HCT VFR BLD CALC: 40 % (ref 34–48)
HDLC SERPL-MCNC: 91 MG/DL
HEMOGLOBIN: 12.7 G/DL (ref 11.5–15.5)
IMMATURE GRANULOCYTES #: 0.02 E9/L
IMMATURE GRANULOCYTES %: 0.3 % (ref 0–5)
LDL CHOLESTEROL CALCULATED: 95 MG/DL (ref 0–99)
LYMPHOCYTES ABSOLUTE: 2.14 E9/L (ref 1.5–4)
LYMPHOCYTES RELATIVE PERCENT: 36.9 % (ref 20–42)
MCH RBC QN AUTO: 28.9 PG (ref 26–35)
MCHC RBC AUTO-ENTMCNC: 31.8 % (ref 32–34.5)
MCV RBC AUTO: 91.1 FL (ref 80–99.9)
MONOCYTES ABSOLUTE: 0.64 E9/L (ref 0.1–0.95)
MONOCYTES RELATIVE PERCENT: 11 % (ref 2–12)
NEUTROPHILS ABSOLUTE: 2.64 E9/L (ref 1.8–7.3)
NEUTROPHILS RELATIVE PERCENT: 45.6 % (ref 43–80)
PDW BLD-RTO: 14.5 FL (ref 11.5–15)
PLATELET # BLD: 157 E9/L (ref 130–450)
PMV BLD AUTO: 11.3 FL (ref 7–12)
POTASSIUM SERPL-SCNC: 5.2 MMOL/L (ref 3.5–5)
RBC # BLD: 4.39 E12/L (ref 3.5–5.5)
SODIUM BLD-SCNC: 140 MMOL/L (ref 132–146)
TOTAL PROTEIN: 7.5 G/DL (ref 6.4–8.3)
TRIGL SERPL-MCNC: 65 MG/DL (ref 0–149)
TSH SERPL DL<=0.05 MIU/L-ACNC: 1.38 UIU/ML (ref 0.27–4.2)
VITAMIN D 25-HYDROXY: 67 NG/ML (ref 30–100)
VLDLC SERPL CALC-MCNC: 13 MG/DL
WBC # BLD: 5.8 E9/L (ref 4.5–11.5)

## 2022-09-20 PROCEDURE — 82306 VITAMIN D 25 HYDROXY: CPT

## 2022-09-20 PROCEDURE — 80061 LIPID PANEL: CPT

## 2022-09-20 PROCEDURE — 85025 COMPLETE CBC W/AUTO DIFF WBC: CPT

## 2022-09-20 PROCEDURE — 80053 COMPREHEN METABOLIC PANEL: CPT

## 2022-09-20 PROCEDURE — 84443 ASSAY THYROID STIM HORMONE: CPT

## 2022-09-20 PROCEDURE — 36415 COLL VENOUS BLD VENIPUNCTURE: CPT

## 2023-06-30 ENCOUNTER — HOSPITAL ENCOUNTER (OUTPATIENT)
Dept: CARDIOLOGY | Age: 88
Discharge: HOME | End: 2023-06-30
Payer: MEDICARE

## 2023-06-30 LAB
LV EF: 58 %
LVEF MODALITY: NORMAL

## 2023-06-30 PROCEDURE — 93306 TTE W/DOPPLER COMPLETE: CPT

## 2023-09-26 ENCOUNTER — HOSPITAL ENCOUNTER (OUTPATIENT)
Age: 88
Discharge: HOME | End: 2023-09-26
Payer: MEDICARE

## 2023-09-26 LAB
25(OH)D3 SERPL-MCNC: 48.3 NG/ML (ref 30–100)
ALBUMIN SERPL-MCNC: 4.5 G/DL (ref 3.5–5.2)
ALP SERPL-CCNC: 92 U/L (ref 35–104)
ALT SERPL-CCNC: 7 U/L (ref 0–32)
ANION GAP SERPL CALCULATED.3IONS-SCNC: 10 MMOL/L (ref 7–16)
AST SERPL-CCNC: 21 U/L (ref 0–31)
BASOPHILS # BLD: 0.03 K/UL (ref 0–0.2)
BASOPHILS NFR BLD: 1 % (ref 0–2)
BILIRUB SERPL-MCNC: 0.4 MG/DL (ref 0–1.2)
BUN SERPL-MCNC: 34 MG/DL (ref 6–23)
CALCIUM SERPL-MCNC: 9.6 MG/DL (ref 8.6–10.2)
CHLORIDE SERPL-SCNC: 95 MMOL/L (ref 98–107)
CHOLEST SERPL-MCNC: 202 MG/DL
CO2 SERPL-SCNC: 32 MMOL/L (ref 22–29)
CREAT SERPL-MCNC: 1.4 MG/DL (ref 0.5–1)
EOSINOPHIL # BLD: 0.25 K/UL (ref 0.05–0.5)
EOSINOPHILS RELATIVE PERCENT: 5 % (ref 0–6)
ERYTHROCYTE [DISTWIDTH] IN BLOOD BY AUTOMATED COUNT: 14.6 % (ref 11.5–15)
GFR SERPL CREATININE-BSD FRML MDRD: 35 ML/MIN/1.73M2
GLUCOSE SERPL-MCNC: 100 MG/DL (ref 74–99)
HCT VFR BLD AUTO: 37.7 % (ref 34–48)
HDLC SERPL-MCNC: 79 MG/DL
HGB BLD-MCNC: 11.9 G/DL (ref 11.5–15.5)
IMM GRANULOCYTES # BLD AUTO: <0.03 K/UL (ref 0–0.58)
IMM GRANULOCYTES NFR BLD: 0 % (ref 0–5)
LDLC SERPL CALC-MCNC: 93 MG/DL
LYMPHOCYTES NFR BLD: 1.9 K/UL (ref 1.5–4)
LYMPHOCYTES RELATIVE PERCENT: 35 % (ref 20–42)
MCH RBC QN AUTO: 28.7 PG (ref 26–35)
MCHC RBC AUTO-ENTMCNC: 31.6 G/DL (ref 32–34.5)
MCV RBC AUTO: 91.1 FL (ref 80–99.9)
MONOCYTES NFR BLD: 0.64 K/UL (ref 0.1–0.95)
MONOCYTES NFR BLD: 12 % (ref 2–12)
NEUTROPHILS NFR BLD: 48 % (ref 43–80)
NEUTS SEG NFR BLD: 2.57 K/UL (ref 1.8–7.3)
PLATELET # BLD AUTO: 199 K/UL (ref 130–450)
PMV BLD AUTO: 10.9 FL (ref 7–12)
POTASSIUM SERPL-SCNC: 4.4 MMOL/L (ref 3.5–5)
PROT SERPL-MCNC: 7.6 G/DL (ref 6.4–8.3)
RBC # BLD AUTO: 4.14 M/UL (ref 3.5–5.5)
SODIUM SERPL-SCNC: 137 MMOL/L (ref 132–146)
TRIGL SERPL-MCNC: 148 MG/DL
TSH SERPL DL<=0.05 MIU/L-ACNC: 2.08 UIU/ML (ref 0.27–4.2)
VLDLC SERPL CALC-MCNC: 30 MG/DL
WBC OTHER # BLD: 5.4 K/UL (ref 4.5–11.5)

## 2023-09-26 PROCEDURE — 85025 COMPLETE CBC W/AUTO DIFF WBC: CPT

## 2023-09-26 PROCEDURE — 36415 COLL VENOUS BLD VENIPUNCTURE: CPT

## 2023-09-26 PROCEDURE — 80053 COMPREHEN METABOLIC PANEL: CPT

## 2023-09-26 PROCEDURE — 82306 VITAMIN D 25 HYDROXY: CPT

## 2023-09-26 PROCEDURE — 80061 LIPID PANEL: CPT

## 2023-09-26 PROCEDURE — 84443 ASSAY THYROID STIM HORMONE: CPT

## 2024-10-18 ENCOUNTER — HOSPITAL ENCOUNTER (OUTPATIENT)
Age: 89
Discharge: HOME | End: 2024-10-18
Payer: MEDICARE

## 2024-10-18 LAB
25(OH)D3 SERPL-MCNC: 47.1 NG/ML (ref 30–100)
ALBUMIN SERPL-MCNC: 4.2 G/DL (ref 3.5–5.2)
ALP SERPL-CCNC: 101 U/L (ref 35–104)
ALT SERPL-CCNC: 12 U/L (ref 0–32)
ANION GAP SERPL CALCULATED.3IONS-SCNC: 7 MMOL/L (ref 7–16)
AST SERPL-CCNC: 23 U/L (ref 0–31)
BASOPHILS # BLD: 0.05 K/UL (ref 0–0.2)
BASOPHILS NFR BLD: 1 % (ref 0–2)
BILIRUB SERPL-MCNC: 0.4 MG/DL (ref 0–1.2)
BUN SERPL-MCNC: 27 MG/DL (ref 6–23)
CALCIUM SERPL-MCNC: 9.1 MG/DL (ref 8.6–10.2)
CHLORIDE SERPL-SCNC: 101 MMOL/L (ref 98–107)
CHOLEST SERPL-MCNC: 202 MG/DL
CO2 SERPL-SCNC: 34 MMOL/L (ref 22–29)
CREAT SERPL-MCNC: 1.4 MG/DL (ref 0.5–1)
EOSINOPHIL # BLD: 0.19 K/UL (ref 0.05–0.5)
EOSINOPHILS RELATIVE PERCENT: 3 % (ref 0–6)
ERYTHROCYTE [DISTWIDTH] IN BLOOD BY AUTOMATED COUNT: 14.1 % (ref 11.5–15)
GFR, ESTIMATED: 35 ML/MIN/1.73M2
GLUCOSE SERPL-MCNC: 108 MG/DL (ref 74–99)
HCT VFR BLD AUTO: 40.5 % (ref 34–48)
HDLC SERPL-MCNC: 106 MG/DL
HGB BLD-MCNC: 13 G/DL (ref 11.5–15.5)
IMM GRANULOCYTES # BLD AUTO: <0.03 K/UL (ref 0–0.58)
IMM GRANULOCYTES NFR BLD: 0 % (ref 0–5)
LDLC SERPL CALC-MCNC: 79 MG/DL
LYMPHOCYTES NFR BLD: 1.85 K/UL (ref 1.5–4)
LYMPHOCYTES RELATIVE PERCENT: 30 % (ref 20–42)
MCH RBC QN AUTO: 29.3 PG (ref 26–35)
MCHC RBC AUTO-ENTMCNC: 32.1 G/DL (ref 32–34.5)
MCV RBC AUTO: 91.4 FL (ref 80–99.9)
MONOCYTES NFR BLD: 0.65 K/UL (ref 0.1–0.95)
MONOCYTES NFR BLD: 11 % (ref 2–12)
NEUTROPHILS NFR BLD: 56 % (ref 43–80)
NEUTS SEG NFR BLD: 3.45 K/UL (ref 1.8–7.3)
PLATELET # BLD AUTO: 175 K/UL (ref 130–450)
PMV BLD AUTO: 11.1 FL (ref 7–12)
POTASSIUM SERPL-SCNC: 4 MMOL/L (ref 3.5–5)
PROT SERPL-MCNC: 6.8 G/DL (ref 6.4–8.3)
RBC # BLD AUTO: 4.43 M/UL (ref 3.5–5.5)
SODIUM SERPL-SCNC: 142 MMOL/L (ref 132–146)
T4 FREE SERPL-MCNC: 1.2 NG/DL (ref 0.9–1.7)
TRIGL SERPL-MCNC: 85 MG/DL
TSH SERPL DL<=0.05 MIU/L-ACNC: 1.81 UIU/ML (ref 0.27–4.2)
VLDLC SERPL CALC-MCNC: 17 MG/DL
WBC OTHER # BLD: 6.2 K/UL (ref 4.5–11.5)

## 2024-10-18 PROCEDURE — 80061 LIPID PANEL: CPT

## 2024-10-18 PROCEDURE — 85025 COMPLETE CBC W/AUTO DIFF WBC: CPT

## 2024-10-18 PROCEDURE — 84439 ASSAY OF FREE THYROXINE: CPT

## 2024-10-18 PROCEDURE — 84443 ASSAY THYROID STIM HORMONE: CPT

## 2024-10-18 PROCEDURE — 82306 VITAMIN D 25 HYDROXY: CPT

## 2024-10-18 PROCEDURE — 36415 COLL VENOUS BLD VENIPUNCTURE: CPT

## 2024-10-18 PROCEDURE — 80053 COMPREHEN METABOLIC PANEL: CPT

## 2025-04-22 ENCOUNTER — HOSPITAL ENCOUNTER (OUTPATIENT)
Age: 89
Discharge: HOME | End: 2025-04-22
Payer: MEDICARE

## 2025-04-22 LAB
25(OH)D3 SERPL-MCNC: 41 NG/ML (ref 30–100)
ALBUMIN SERPL-MCNC: 4.5 G/DL (ref 3.5–5.2)
ALP SERPL-CCNC: 105 U/L (ref 35–104)
ALT SERPL-CCNC: 12 U/L (ref 0–32)
ANION GAP SERPL CALCULATED.3IONS-SCNC: 13 MMOL/L (ref 7–16)
AST SERPL-CCNC: 24 U/L (ref 0–31)
BASOPHILS # BLD: 0.03 K/UL (ref 0–0.2)
BASOPHILS NFR BLD: 1 % (ref 0–2)
BILIRUB SERPL-MCNC: 0.4 MG/DL (ref 0–1.2)
BUN SERPL-MCNC: 29 MG/DL (ref 6–23)
CALCIUM SERPL-MCNC: 9.6 MG/DL (ref 8.6–10.2)
CHLORIDE SERPL-SCNC: 99 MMOL/L (ref 98–107)
CHOLEST SERPL-MCNC: 228 MG/DL
CO2 SERPL-SCNC: 29 MMOL/L (ref 22–29)
CREAT SERPL-MCNC: 1.4 MG/DL (ref 0.5–1)
EOSINOPHIL # BLD: 0.23 K/UL (ref 0.05–0.5)
EOSINOPHILS RELATIVE PERCENT: 4 % (ref 0–6)
ERYTHROCYTE [DISTWIDTH] IN BLOOD BY AUTOMATED COUNT: 15.1 % (ref 11.5–15)
GFR, ESTIMATED: 36 ML/MIN/1.73M2
GLUCOSE SERPL-MCNC: 120 MG/DL (ref 74–99)
HCT VFR BLD AUTO: 41.4 % (ref 34–48)
HDLC SERPL-MCNC: 102 MG/DL
HGB BLD-MCNC: 12.9 G/DL (ref 11.5–15.5)
IMM GRANULOCYTES # BLD AUTO: <0.03 K/UL (ref 0–0.58)
IMM GRANULOCYTES NFR BLD: 0 % (ref 0–5)
LDLC SERPL CALC-MCNC: 106 MG/DL
LYMPHOCYTES NFR BLD: 1.87 K/UL (ref 1.5–4)
LYMPHOCYTES RELATIVE PERCENT: 32 % (ref 20–42)
MCH RBC QN AUTO: 28.8 PG (ref 26–35)
MCHC RBC AUTO-ENTMCNC: 31.2 G/DL (ref 32–34.5)
MCV RBC AUTO: 92.4 FL (ref 80–99.9)
MONOCYTES NFR BLD: 0.59 K/UL (ref 0.1–0.95)
MONOCYTES NFR BLD: 10 % (ref 2–12)
NEUTROPHILS NFR BLD: 53 % (ref 43–80)
NEUTS SEG NFR BLD: 3.11 K/UL (ref 1.8–7.3)
PLATELET # BLD AUTO: 159 K/UL (ref 130–450)
PMV BLD AUTO: 11 FL (ref 7–12)
POTASSIUM SERPL-SCNC: 4.2 MMOL/L (ref 3.5–5)
PROT SERPL-MCNC: 7.3 G/DL (ref 6.4–8.3)
RBC # BLD AUTO: 4.48 M/UL (ref 3.5–5.5)
SODIUM SERPL-SCNC: 141 MMOL/L (ref 132–146)
TRIGL SERPL-MCNC: 102 MG/DL
TSH SERPL DL<=0.05 MIU/L-ACNC: 2.04 UIU/ML (ref 0.27–4.2)
VLDLC SERPL CALC-MCNC: 20 MG/DL
WBC OTHER # BLD: 5.8 K/UL (ref 4.5–11.5)

## 2025-04-22 PROCEDURE — 36415 COLL VENOUS BLD VENIPUNCTURE: CPT

## 2025-04-22 PROCEDURE — 82306 VITAMIN D 25 HYDROXY: CPT

## 2025-04-22 PROCEDURE — 80061 LIPID PANEL: CPT

## 2025-04-22 PROCEDURE — 85025 COMPLETE CBC W/AUTO DIFF WBC: CPT

## 2025-04-22 PROCEDURE — 80053 COMPREHEN METABOLIC PANEL: CPT

## 2025-04-22 PROCEDURE — 84443 ASSAY THYROID STIM HORMONE: CPT
